# Patient Record
Sex: FEMALE | Race: WHITE | NOT HISPANIC OR LATINO | URBAN - METROPOLITAN AREA
[De-identification: names, ages, dates, MRNs, and addresses within clinical notes are randomized per-mention and may not be internally consistent; named-entity substitution may affect disease eponyms.]

---

## 2018-06-19 ENCOUNTER — OUTPATIENT (OUTPATIENT)
Dept: OUTPATIENT SERVICES | Facility: HOSPITAL | Age: 83
LOS: 1 days | Discharge: HOME | End: 2018-06-19

## 2018-06-19 DIAGNOSIS — E55.9 VITAMIN D DEFICIENCY, UNSPECIFIED: ICD-10-CM

## 2018-06-19 DIAGNOSIS — E78.4 OTHER HYPERLIPIDEMIA: ICD-10-CM

## 2018-06-19 DIAGNOSIS — I10 ESSENTIAL (PRIMARY) HYPERTENSION: ICD-10-CM

## 2018-08-25 ENCOUNTER — INPATIENT (INPATIENT)
Facility: HOSPITAL | Age: 83
LOS: 3 days | Discharge: HOME HEALTH | End: 2018-08-29
Attending: INTERNAL MEDICINE | Admitting: INTERNAL MEDICINE
Payer: MEDICARE

## 2018-08-25 VITALS
OXYGEN SATURATION: 99 % | TEMPERATURE: 97 F | DIASTOLIC BLOOD PRESSURE: 81 MMHG | SYSTOLIC BLOOD PRESSURE: 177 MMHG | HEART RATE: 74 BPM | RESPIRATION RATE: 18 BRPM

## 2018-08-25 LAB
ALBUMIN SERPL ELPH-MCNC: 4.2 G/DL — SIGNIFICANT CHANGE UP (ref 3.5–5.2)
ALP SERPL-CCNC: 65 U/L — SIGNIFICANT CHANGE UP (ref 30–115)
ALT FLD-CCNC: 8 U/L — SIGNIFICANT CHANGE UP (ref 0–41)
ANION GAP SERPL CALC-SCNC: 12 MMOL/L — SIGNIFICANT CHANGE UP (ref 7–14)
APPEARANCE UR: CLEAR — SIGNIFICANT CHANGE UP
AST SERPL-CCNC: 20 U/L — SIGNIFICANT CHANGE UP (ref 0–41)
BASE EXCESS BLDV CALC-SCNC: 8.5 MMOL/L — HIGH (ref -2–2)
BASOPHILS # BLD AUTO: 0.01 K/UL — SIGNIFICANT CHANGE UP (ref 0–0.2)
BASOPHILS NFR BLD AUTO: 0.1 % — SIGNIFICANT CHANGE UP (ref 0–1)
BILIRUB DIRECT SERPL-MCNC: <0.2 MG/DL — SIGNIFICANT CHANGE UP (ref 0–0.2)
BILIRUB INDIRECT FLD-MCNC: >0.4 MG/DL — SIGNIFICANT CHANGE UP (ref 0.2–1.2)
BILIRUB SERPL-MCNC: 0.6 MG/DL — SIGNIFICANT CHANGE UP (ref 0.2–1.2)
BILIRUB UR-MCNC: NEGATIVE — SIGNIFICANT CHANGE UP
BLD GP AB SCN SERPL QL: SIGNIFICANT CHANGE UP
BUN SERPL-MCNC: 23 MG/DL — HIGH (ref 10–20)
CA-I SERPL-SCNC: 1.27 MMOL/L — SIGNIFICANT CHANGE UP (ref 1.12–1.3)
CALCIUM SERPL-MCNC: 10.1 MG/DL — SIGNIFICANT CHANGE UP (ref 8.5–10.1)
CHLORIDE SERPL-SCNC: 94 MMOL/L — LOW (ref 98–110)
CO2 SERPL-SCNC: 30 MMOL/L — SIGNIFICANT CHANGE UP (ref 17–32)
COLOR SPEC: YELLOW — SIGNIFICANT CHANGE UP
CREAT SERPL-MCNC: 1 MG/DL — SIGNIFICANT CHANGE UP (ref 0.7–1.5)
DIFF PNL FLD: NEGATIVE — SIGNIFICANT CHANGE UP
DIGOXIN SERPL-MCNC: <0.3 NG/ML — LOW (ref 0.8–2)
EOSINOPHIL # BLD AUTO: 0.01 K/UL — SIGNIFICANT CHANGE UP (ref 0–0.7)
EOSINOPHIL NFR BLD AUTO: 0.1 % — SIGNIFICANT CHANGE UP (ref 0–8)
GAS PNL BLDV: 139 MMOL/L — SIGNIFICANT CHANGE UP (ref 136–145)
GAS PNL BLDV: SIGNIFICANT CHANGE UP
GLUCOSE SERPL-MCNC: 113 MG/DL — HIGH (ref 70–99)
GLUCOSE UR QL: NEGATIVE MG/DL — SIGNIFICANT CHANGE UP
HCO3 BLDV-SCNC: 35 MMOL/L — HIGH (ref 22–29)
HCT VFR BLD CALC: 36.8 % — LOW (ref 37–47)
HCT VFR BLDA CALC: 34.8 % — SIGNIFICANT CHANGE UP (ref 34–44)
HGB BLD CALC-MCNC: 11.4 G/DL — LOW (ref 14–18)
HGB BLD-MCNC: 12.3 G/DL — SIGNIFICANT CHANGE UP (ref 12–16)
IMM GRANULOCYTES NFR BLD AUTO: 0.4 % — HIGH (ref 0.1–0.3)
KETONES UR-MCNC: NEGATIVE — SIGNIFICANT CHANGE UP
LACTATE BLDV-MCNC: 1.6 MMOL/L — SIGNIFICANT CHANGE UP (ref 0.5–1.6)
LEUKOCYTE ESTERASE UR-ACNC: NEGATIVE — SIGNIFICANT CHANGE UP
LYMPHOCYTES # BLD AUTO: 1.27 K/UL — SIGNIFICANT CHANGE UP (ref 1.2–3.4)
LYMPHOCYTES # BLD AUTO: 17.9 % — LOW (ref 20.5–51.1)
MCHC RBC-ENTMCNC: 29.8 PG — SIGNIFICANT CHANGE UP (ref 27–31)
MCHC RBC-ENTMCNC: 33.4 G/DL — SIGNIFICANT CHANGE UP (ref 32–37)
MCV RBC AUTO: 89.1 FL — SIGNIFICANT CHANGE UP (ref 81–99)
MONOCYTES # BLD AUTO: 0.49 K/UL — SIGNIFICANT CHANGE UP (ref 0.1–0.6)
MONOCYTES NFR BLD AUTO: 6.9 % — SIGNIFICANT CHANGE UP (ref 1.7–9.3)
NEUTROPHILS # BLD AUTO: 5.28 K/UL — SIGNIFICANT CHANGE UP (ref 1.4–6.5)
NEUTROPHILS NFR BLD AUTO: 74.6 % — SIGNIFICANT CHANGE UP (ref 42.2–75.2)
NITRITE UR-MCNC: NEGATIVE — SIGNIFICANT CHANGE UP
NRBC # BLD: 0 /100 WBCS — SIGNIFICANT CHANGE UP (ref 0–0)
NT-PROBNP SERPL-SCNC: 845 PG/ML — HIGH (ref 0–300)
PCO2 BLDV: 60 MMHG — HIGH (ref 41–51)
PH BLDV: 7.38 — SIGNIFICANT CHANGE UP (ref 7.26–7.43)
PH UR: 7 — SIGNIFICANT CHANGE UP (ref 5–8)
PLATELET # BLD AUTO: 210 K/UL — SIGNIFICANT CHANGE UP (ref 130–400)
PO2 BLDV: 19 MMHG — LOW (ref 20–40)
POTASSIUM BLDV-SCNC: 4 MMOL/L — SIGNIFICANT CHANGE UP (ref 3.3–5.6)
POTASSIUM SERPL-MCNC: 4 MMOL/L — SIGNIFICANT CHANGE UP (ref 3.5–5)
POTASSIUM SERPL-SCNC: 4 MMOL/L — SIGNIFICANT CHANGE UP (ref 3.5–5)
PROT SERPL-MCNC: 7.5 G/DL — SIGNIFICANT CHANGE UP (ref 6–8)
PROT UR-MCNC: NEGATIVE MG/DL — SIGNIFICANT CHANGE UP
RBC # BLD: 4.13 M/UL — LOW (ref 4.2–5.4)
RBC # FLD: 13.4 % — SIGNIFICANT CHANGE UP (ref 11.5–14.5)
SAO2 % BLDV: 24 % — SIGNIFICANT CHANGE UP
SODIUM SERPL-SCNC: 136 MMOL/L — SIGNIFICANT CHANGE UP (ref 135–146)
SP GR SPEC: 1.01 — SIGNIFICANT CHANGE UP (ref 1.01–1.03)
TYPE + AB SCN PNL BLD: SIGNIFICANT CHANGE UP
UROBILINOGEN FLD QL: 0.2 MG/DL — SIGNIFICANT CHANGE UP (ref 0.2–0.2)
WBC # BLD: 7.09 K/UL — SIGNIFICANT CHANGE UP (ref 4.8–10.8)
WBC # FLD AUTO: 7.09 K/UL — SIGNIFICANT CHANGE UP (ref 4.8–10.8)

## 2018-08-25 RX ORDER — IOHEXOL 300 MG/ML
30 INJECTION, SOLUTION INTRAVENOUS ONCE
Qty: 0 | Refills: 0 | Status: COMPLETED | OUTPATIENT
Start: 2018-08-25 | End: 2018-08-25

## 2018-08-25 RX ORDER — SODIUM CHLORIDE 9 MG/ML
3 INJECTION INTRAMUSCULAR; INTRAVENOUS; SUBCUTANEOUS ONCE
Qty: 0 | Refills: 0 | Status: COMPLETED | OUTPATIENT
Start: 2018-08-25 | End: 2018-08-25

## 2018-08-25 RX ORDER — SODIUM CHLORIDE 9 MG/ML
500 INJECTION, SOLUTION INTRAVENOUS ONCE
Qty: 0 | Refills: 0 | Status: COMPLETED | OUTPATIENT
Start: 2018-08-25 | End: 2018-08-25

## 2018-08-25 RX ADMIN — SODIUM CHLORIDE 500 MILLILITER(S): 9 INJECTION, SOLUTION INTRAVENOUS at 23:29

## 2018-08-25 RX ADMIN — SODIUM CHLORIDE 3 MILLILITER(S): 9 INJECTION INTRAMUSCULAR; INTRAVENOUS; SUBCUTANEOUS at 19:43

## 2018-08-25 RX ADMIN — IOHEXOL 30 MILLILITER(S): 300 INJECTION, SOLUTION INTRAVENOUS at 19:48

## 2018-08-25 NOTE — ED PROVIDER NOTE - PROGRESS NOTE DETAILS
pt resting comfortable in nad, pending urine and imaging, labs reviewed lactate 1.6, will continue to monitor and reassess.

## 2018-08-25 NOTE — ED ADULT NURSE NOTE - CAS EDN DISCHARGE ASSESSMENT
Patient baseline mental status Awake/Patient baseline mental status/Alert and oriented to person, place and time

## 2018-08-25 NOTE — ED ADULT NURSE NOTE - OBJECTIVE STATEMENT
pt is a 89 yo male co generalized abd pain and diarrhea starting 2 days ago. diarrea noted to be dark greenish color watery. denies n/d/diz/ha. abd soft nontender, +bowel sounds. clear lungs.

## 2018-08-25 NOTE — ED PROVIDER NOTE - ATTENDING CONTRIBUTION TO CARE
I personally evaluated the patient. I reviewed the Resident’s or Physician Assistant’s note (as assigned above), and agree with the findings and plan except as documented in my note.  A 89 y/o f w/ pmhx of htn, dld, dm, anemia reports was on iron, oa, chf on digoxin, afib on xarelto and Cardizem, colon surgery where pt reports she had a colostomy that was revered (cannot specify why) presents fo diarrhea, diffuse, dark in color, and watery since yesterday, worsened today associated generalized abd pain. not on any abx, no one with similar symptoms. reports symptoms started adfter she possibly had a salad yesterday. No fever, chills, n/v, cp, sob, pleuritic cp, palpitations, diaphoresis, cough, ha/lh/dizziness, numbness/tingling, neck pain/ stiffness, urinary symptoms, trauma, weakness, edema, calf pain/swelling/erythema, sick contacts, recent travel or rash.  On Exam: Vital Signs: I have reviewed the initial vital signs. Constitutional: WDWN in nad. Sitting on stretcher speaking full sentences. Integumentary: No rash. ENT: Dry MM NECK: Supple, non-tender, no meningeal signs. Cardiovascular: RRR, radial pulses 2/4 b/l. No JVD. Respiratory: BS present b/l, ctabl, no wheezing or crackles, no accessory muscle use, no stridor. Gastrointestinal: BS present throughout all 4 quadrants, soft, nd, generalized tenderness to palpation, no rebound tenderness or guarding, no cvat. Musculoskeletal: FROM, 2+ pitting edema, no calf pain/swelling/erythema. Neurologic: AAOx3, motor 5/5 and sensation intact throughout upper and lowee ext, CN II-XII intact, No facial droop or slurring of speech. No focal deficits. I personally evaluated the patient. I reviewed the Resident’s or Physician Assistant’s note (as assigned above), and agree with the findings and plan except as documented in my note.  A 89 y/o f w/ pmhx of htn, dld, dm, anemia reports was on iron, oa, chf on digoxin, afib on xarelto and Cardizem, colon surgery where pt reports she had a colostomy that was revered (cannot specify why) presents fo diarrhea, diffuse, dark in color, and watery since yesterday, worsened today associated generalized abd pain. not on any abx, no one with similar symptoms. reports symptoms started adfter she possibly had a salad yesterday. No fever, chills, n/v, cp, sob, pleuritic cp, palpitations, diaphoresis, cough, ha/lh/dizziness, numbness/tingling, neck pain/ stiffness, urinary symptoms, trauma, weakness, edema, calf pain/swelling/erythema, sick contacts, recent travel or rash.  On Exam: Vital Signs: I have reviewed the initial vital signs. Constitutional: WDWN in nad. Sitting on stretcher speaking full sentences. Integumentary: No rash. ENT: Dry MM NECK: Supple, non-tender, no meningeal signs. Cardiovascular: RRR, radial pulses 2/4 b/l. No JVD. Respiratory: BS present b/l, ctabl, no wheezing or crackles, no accessory muscle use, no stridor. Gastrointestinal: BS present throughout all 4 quadrants, soft, nd, generalized tenderness to palpation, no rebound tenderness or guarding, no cvat. rectal as done by P.A. : brown stool no melena p brbpr. Musculoskeletal: FROM, 2+ pitting edema, no calf pain/swelling/erythema. Neurologic: AAOx3, motor 5/5 and sensation intact throughout upper and lowee ext, CN II-XII intact, No facial droop or slurring of speech. No focal deficits.

## 2018-08-25 NOTE — ED PROVIDER NOTE - PHYSICAL EXAMINATION
Constitutional: Well developed, well nourished. NAD  Head: Normocephalic, atraumatic.  Eyes: PERRL, EOMI.  ENT: No nasal discharge. Mucous membranes dry.  Neck: Supple. Painless ROM.  Cardiovascular: Regular rate and rhythm.   Pulmonary: Copd chest; decreased breath sounds bilat; no focal consolidation  Abdominal: Soft. mild tenderness diffuse lower abdomen; no rebound, guarding or flank pain  Extremities. Pelvis stable; + 3 bilat pedal edema; no calf tenderness  Skin: No rashes, cyanosis.  Neuro: AAOx3. No focal neurological deficits.  Psych: Normal mood. Normal affect.

## 2018-08-25 NOTE — ED PROVIDER NOTE - NS ED ROS FT
Constitutional: No fever, chills.  Eyes: No visual changes.  ENT: No hearing changes.  Neck: No neck pain or stiffness.  Cardiovascular: No chest pain, palpitations, edema.  Pulmonary: No SOB, cough. No hemoptysis.  Abdominal:  see hpi  : No dysuria, frequency.  Neuro: No headache, syncope, dizziness.  MS: No back pain. No calf pain/swelling.  Psych: No suicidal ideations.

## 2018-08-25 NOTE — ED ADULT NURSE NOTE - NSIMPLEMENTINTERV_GEN_ALL_ED
Implemented All Fall with Harm Risk Interventions:  [place] to call system. Call bell, personal items and telephone within reach. Instruct patient to call for assistance. Room bathroom lighting operational. Non-slip footwear when patient is off stretcher. Physically safe environment: no spills, clutter or unnecessary equipment. Stretcher in lowest position, wheels locked, appropriate side rails in place. Provide visual cue, wrist band, yellow gown, etc. Monitor gait and stability. Monitor for mental status changes and reorient to person, place, and time. Review medications for side effects contributing to fall risk. Reinforce activity limits and safety measures with patient and family. Provide visual clues: red socks.

## 2018-08-25 NOTE — ED PROVIDER NOTE - OBJECTIVE STATEMENT
89 yold female to Ed Pmhx Htn, Hld, Dm, OA, CHF, afib(currently on xeralto, dig, cardizem), hx of colon sx(colostomy reversed) c/o diffuse lower abdominal pain, profuse dark watery diarrhea started last night and got worse tonight; pt [place] denies n/v, sob, fever, chills back pain; believes possibly related to ?salad; pt with hx of anemia - has had ? transfuions in past;

## 2018-08-25 NOTE — ED PROVIDER NOTE - CARE PLAN
Assessment and plan of treatment:	Plan: EKG, CXR, labs, gentle fluid hydration as pt with history of chf, urine, imaging, reassess. Principal Discharge DX:	Intractable diarrhea  Assessment and plan of treatment:	Plan: EKG, CXR, labs, gentle fluid hydration as pt with history of chf, urine, imaging, reassess.

## 2018-08-26 DIAGNOSIS — R09.89 OTHER SPECIFIED SYMPTOMS AND SIGNS INVOLVING THE CIRCULATORY AND RESPIRATORY SYSTEMS: ICD-10-CM

## 2018-08-26 DIAGNOSIS — Z90.710 ACQUIRED ABSENCE OF BOTH CERVIX AND UTERUS: Chronic | ICD-10-CM

## 2018-08-26 DIAGNOSIS — Z90.49 ACQUIRED ABSENCE OF OTHER SPECIFIED PARTS OF DIGESTIVE TRACT: Chronic | ICD-10-CM

## 2018-08-26 LAB
ANION GAP SERPL CALC-SCNC: 10 MMOL/L — SIGNIFICANT CHANGE UP (ref 7–14)
BASOPHILS # BLD AUTO: 0.01 K/UL — SIGNIFICANT CHANGE UP (ref 0–0.2)
BASOPHILS NFR BLD AUTO: 0.2 % — SIGNIFICANT CHANGE UP (ref 0–1)
BUN SERPL-MCNC: 16 MG/DL — SIGNIFICANT CHANGE UP (ref 10–20)
CALCIUM SERPL-MCNC: 8.8 MG/DL — SIGNIFICANT CHANGE UP (ref 8.5–10.1)
CHLORIDE SERPL-SCNC: 100 MMOL/L — SIGNIFICANT CHANGE UP (ref 98–110)
CO2 SERPL-SCNC: 28 MMOL/L — SIGNIFICANT CHANGE UP (ref 17–32)
CREAT SERPL-MCNC: 0.8 MG/DL — SIGNIFICANT CHANGE UP (ref 0.7–1.5)
EOSINOPHIL # BLD AUTO: 0.07 K/UL — SIGNIFICANT CHANGE UP (ref 0–0.7)
EOSINOPHIL NFR BLD AUTO: 1.3 % — SIGNIFICANT CHANGE UP (ref 0–8)
ERYTHROCYTE [SEDIMENTATION RATE] IN BLOOD: 31 MM/HR — HIGH (ref 0–20)
GLUCOSE SERPL-MCNC: 100 MG/DL — HIGH (ref 70–99)
HCT VFR BLD CALC: 31.6 % — LOW (ref 37–47)
HGB BLD-MCNC: 10.9 G/DL — LOW (ref 12–16)
IMM GRANULOCYTES NFR BLD AUTO: 0.4 % — HIGH (ref 0.1–0.3)
LYMPHOCYTES # BLD AUTO: 1.32 K/UL — SIGNIFICANT CHANGE UP (ref 1.2–3.4)
LYMPHOCYTES # BLD AUTO: 23.7 % — SIGNIFICANT CHANGE UP (ref 20.5–51.1)
MAGNESIUM SERPL-MCNC: 1.7 MG/DL — LOW (ref 1.8–2.4)
MCHC RBC-ENTMCNC: 30.1 PG — SIGNIFICANT CHANGE UP (ref 27–31)
MCHC RBC-ENTMCNC: 34.5 G/DL — SIGNIFICANT CHANGE UP (ref 32–37)
MCV RBC AUTO: 87.3 FL — SIGNIFICANT CHANGE UP (ref 81–99)
MONOCYTES # BLD AUTO: 0.42 K/UL — SIGNIFICANT CHANGE UP (ref 0.1–0.6)
MONOCYTES NFR BLD AUTO: 7.5 % — SIGNIFICANT CHANGE UP (ref 1.7–9.3)
NEUTROPHILS # BLD AUTO: 3.73 K/UL — SIGNIFICANT CHANGE UP (ref 1.4–6.5)
NEUTROPHILS NFR BLD AUTO: 66.9 % — SIGNIFICANT CHANGE UP (ref 42.2–75.2)
NRBC # BLD: 0 /100 WBCS — SIGNIFICANT CHANGE UP (ref 0–0)
PLATELET # BLD AUTO: 177 K/UL — SIGNIFICANT CHANGE UP (ref 130–400)
POTASSIUM SERPL-MCNC: 3.6 MMOL/L — SIGNIFICANT CHANGE UP (ref 3.5–5)
POTASSIUM SERPL-SCNC: 3.6 MMOL/L — SIGNIFICANT CHANGE UP (ref 3.5–5)
RBC # BLD: 3.62 M/UL — LOW (ref 4.2–5.4)
RBC # FLD: 13.2 % — SIGNIFICANT CHANGE UP (ref 11.5–14.5)
SODIUM SERPL-SCNC: 138 MMOL/L — SIGNIFICANT CHANGE UP (ref 135–146)
WBC # BLD: 5.57 K/UL — SIGNIFICANT CHANGE UP (ref 4.8–10.8)
WBC # FLD AUTO: 5.57 K/UL — SIGNIFICANT CHANGE UP (ref 4.8–10.8)

## 2018-08-26 PROCEDURE — 93970 EXTREMITY STUDY: CPT | Mod: 26

## 2018-08-26 RX ORDER — LISINOPRIL 2.5 MG/1
40 TABLET ORAL DAILY
Qty: 0 | Refills: 0 | Status: DISCONTINUED | OUTPATIENT
Start: 2018-08-26 | End: 2018-08-29

## 2018-08-26 RX ORDER — HYDROCHLOROTHIAZIDE 25 MG
12.5 TABLET ORAL DAILY
Qty: 0 | Refills: 0 | Status: DISCONTINUED | OUTPATIENT
Start: 2018-08-26 | End: 2018-08-29

## 2018-08-26 RX ORDER — METOPROLOL TARTRATE 50 MG
25 TABLET ORAL
Qty: 0 | Refills: 0 | Status: DISCONTINUED | OUTPATIENT
Start: 2018-08-26 | End: 2018-08-29

## 2018-08-26 RX ORDER — FERROUS SULFATE 325(65) MG
1 TABLET ORAL
Qty: 0 | Refills: 0 | COMMUNITY

## 2018-08-26 RX ORDER — PANTOPRAZOLE SODIUM 20 MG/1
40 TABLET, DELAYED RELEASE ORAL
Qty: 0 | Refills: 0 | Status: DISCONTINUED | OUTPATIENT
Start: 2018-08-26 | End: 2018-08-29

## 2018-08-26 RX ORDER — FERROUS SULFATE 325(65) MG
325 TABLET ORAL DAILY
Qty: 0 | Refills: 0 | Status: DISCONTINUED | OUTPATIENT
Start: 2018-08-26 | End: 2018-08-29

## 2018-08-26 RX ORDER — RIVAROXABAN 15 MG-20MG
10 KIT ORAL EVERY 24 HOURS
Qty: 0 | Refills: 0 | Status: DISCONTINUED | OUTPATIENT
Start: 2018-08-26 | End: 2018-08-29

## 2018-08-26 RX ADMIN — Medication 25 MILLIGRAM(S): at 18:00

## 2018-08-26 RX ADMIN — PANTOPRAZOLE SODIUM 40 MILLIGRAM(S): 20 TABLET, DELAYED RELEASE ORAL at 06:30

## 2018-08-26 RX ADMIN — RIVAROXABAN 10 MILLIGRAM(S): KIT at 18:00

## 2018-08-26 RX ADMIN — Medication 12.5 MILLIGRAM(S): at 06:30

## 2018-08-26 RX ADMIN — Medication 1 TABLET(S): at 11:56

## 2018-08-26 RX ADMIN — Medication 25 MILLIGRAM(S): at 06:30

## 2018-08-26 RX ADMIN — LISINOPRIL 40 MILLIGRAM(S): 2.5 TABLET ORAL at 06:30

## 2018-08-26 RX ADMIN — Medication 325 MILLIGRAM(S): at 11:56

## 2018-08-26 NOTE — H&P ADULT - NSHPSOCIALHISTORY_GEN_ALL_CORE
Social History:    Lives with: ( X ) alone  (  ) children   (  ) spouse   (  ) parents  (  ) other    Substance Use (street drugs): ( X ) never used  (  ) other:  Tobacco Usage:  ( X ) never smoked   (   ) former smoker   (   ) current smoker  (     ) pack year  (        ) last cigarette date  Alcohol Usage: Negative

## 2018-08-26 NOTE — H&P ADULT - ATTENDING COMMENTS
89 yo female patient with PMHx of paroxysmal AF on Xarelto, HTN, DL, presented from home because of acute onset diarrhea of 1 day duration.  Patient says she started experiencing watery non bloody diarrhea since the morning, multiple episodes (around 4 episodes), associated with abdominal cramping. No nausea or vomiting. Yesterday she ate home made salad with lemon oil dressing, no lebron. Patient denies chills or fevers, but admits having lower extremities swelling for few days now.  Currently, patient is lying comfortably in bed, no other complaints.  Patient lives at home by herself. Her daughter visits her frequently. She walks with a walker.    REVIEW OF SYSTEMS: see cc/HPI  CONSTITUTIONAL: No weakness, fevers or chills  EYES/ENT: No visual changes;  No vertigo or throat pain   NECK: No pain or stiffness  RESPIRATORY: No cough, wheezing, hemoptysis; No shortness of breath  CARDIOVASCULAR: No chest pain or palpitations  GASTROINTESTINAL: No abdominal or epigastric pain. No nausea, vomiting, or hematemesis; (+) diarrhea NO constipation. No melena or hematochezia.  GENITOURINARY: No dysuria, frequency or hematuria  NEUROLOGICAL: No numbness or weakness  SKIN: No itching, rashes    Physical Exam:    General: WN/WD NAD  Neurology: A&Ox3, nonfocal, follows commands  Eyes: PERRLA/ EOMI  ENT/Neck: Neck supple, trachea midline, No JVD  Respiratory: CTA B/L, No wheezing, rales, rhonchi  CV: Normal rate regular rhythm, S1S2, no murmurs, rubs or gallops  Abdominal: Soft, NT, ND +BS-hyperactive   Extremities: No edema, + peripheral pulses  Skin: No Rashes, Hematoma, Ecchymosis  Incisions: n/a  Tubes: n/a    A/P  Gastroenteritis - acute r/o C diff  -IV fluid x 12-24  -hold Mg Ox supplement for now   -send stool for eval    Pedal edema - not present on PE and no evidence of HF  - check 2D echo and TSH     P. A. Fib / HTN   -hold diuretic for now   -c/w Metoprolol, Lisinopril and Xarelto

## 2018-08-26 NOTE — H&P ADULT - NSHPPHYSICALEXAM_GEN_ALL_CORE
PHYSICAL EXAM:    T(F): 97.1, Max: 97.1 (08-25-18 @ 23:18)  HR: 76  BP: 153/80  RR: 18  SpO2: 98%    GENERAL: NAD, well-developed  HEAD:  Atraumatic, Normocephalic  EYES: EOMI, PERRLA, conjunctiva and sclera clear  NECK: Supple, No JVD  CHEST/LUNG: Clear to auscultation bilaterally; No wheeze  HEART: Regular rate and rhythm; No murmurs, rubs, or gallops  ABDOMEN: Soft, Nontender, Nondistended; Bowel sounds present  EXTREMITIES:  1+ Peripheral Pulses, No clubbing or cyanosis, bilateral lower extremities pitting edema +2  PSYCH: AAOx3  NEUROLOGY: non-focal

## 2018-08-26 NOTE — ED ADULT NURSE REASSESSMENT NOTE - NS ED NURSE REASSESS COMMENT FT1
Pt soiled self in bed attempted to put self on bedpan, perineal care provided x2 people. Pt resting comfortably at this time. Unable to collect stool sample at this time.

## 2018-08-26 NOTE — H&P ADULT - NSHPLABSRESULTS_GEN_ALL_CORE
12.3   7.09  )-----------( 210      ( 25 Aug 2018 20:00 )             36.8     136  |  94<L>  |  23<H>  ----------------------------<  113<H>  4.0   |  30  |  1.0    Ca    10.1      25 Aug 2018 20:00    TPro  7.5  /  Alb  4.2  /  TBili  0.6  /  DBili  <0.2  /  AST  20  /  ALT  8   /  AlkPhos  65  08-        Urinalysis Basic - ( 25 Aug 2018 22:02 )    Color: Yellow / Appearance: Clear / S.010 / pH: x  Gluc: x / Ketone: Negative  / Bili: Negative / Urobili: 0.2 mg/dL   Blood: x / Protein: Negative mg/dL / Nitrite: Negative   Leuk Esterase: Negative / RBC: x / WBC x   Sq Epi: x / Non Sq Epi: x / Bacteria: x    CXR: normal    ECG: sinus rhythm at 85bpm, no ischemic changes    < from: CT Abdomen and Pelvis w/ Oral Cont and w/ IV Cont (18 @ 22:58) >    IMPRESSION:    No CT evidence of acute intra-abdominal pathology.    Increasing CBD dilatation. If clinically warranted, MRCP may be of   benefit.    No evidence of small bowel obstruction    < end of copied text >

## 2018-08-26 NOTE — H&P ADULT - HISTORY OF PRESENT ILLNESS
89 yo female patient with PMHx of paroxysmal AF on Xarelto, HTN, DL, presented from home because of acute onset diarrhea of 1 day duration.  Patient says she started experiencing watery non bloody diarrhea since the morning, multiple episodes (around 4 episodes), associated with abdominal cramping. No nausea or vomiting. Yesterday she ate home made salad with lemon oil dressing, no lebron. Patient denies chills or fevers, but admits having lower extremities swelling for few days now.  Currently, patient is lying comfortably in bed, no other complaints.  Patient lives at home by herself. Her daughter visits her frequently. She walks with a walker.

## 2018-08-26 NOTE — H&P ADULT - ASSESSMENT
89 yo female patient with PMHx of paroxysmal AF on Xarelto, HTN, DL, presented from home because of acute onset diarrhea of 1 day duration.    #) Acute gastroenteritis  Watery diarrhea more than 5 episodes in 24h, non bloody; no fever no chills no WBC  R/O food poisoning, R/O viral gastroenteritis, R/O non-infectious causes of diarrhea (magnesium oxide induced...)  Symptomatic treatment for now, no antibiotic therapy indicated  Analgesics as needed, anti-emetics as needed    #) Bilateral lower extremities edema +2  check TSH, check 2d echo R/O CHF  Won't give lasix for now, patient is having diarrhea, especially that patient has no SOB or pulmonary edema (no urgency in giving diuretics)  Check venous duplex lower extremities    #) Paroxysmal AF  continue with metoprolol and xarelto    #) HTN  continue with lisinopril, hctz    #) Miscellaneous  DVT and GI ppx  DASH diet, anti diarrheal  Ambulate with walker, PT eval  Full code  dispo home vs SNF

## 2018-08-27 LAB
ANION GAP SERPL CALC-SCNC: 18 MMOL/L — HIGH (ref 7–14)
BASOPHILS # BLD AUTO: 0.02 K/UL — SIGNIFICANT CHANGE UP (ref 0–0.2)
BASOPHILS NFR BLD AUTO: 0.3 % — SIGNIFICANT CHANGE UP (ref 0–1)
BUN SERPL-MCNC: 16 MG/DL — SIGNIFICANT CHANGE UP (ref 10–20)
CALCIUM SERPL-MCNC: 8.8 MG/DL — SIGNIFICANT CHANGE UP (ref 8.5–10.1)
CHLORIDE SERPL-SCNC: 97 MMOL/L — LOW (ref 98–110)
CO2 SERPL-SCNC: 23 MMOL/L — SIGNIFICANT CHANGE UP (ref 17–32)
CREAT SERPL-MCNC: 0.9 MG/DL — SIGNIFICANT CHANGE UP (ref 0.7–1.5)
CRP SERPL-MCNC: 0.56 MG/DL — HIGH (ref 0–0.4)
EOSINOPHIL # BLD AUTO: 0.07 K/UL — SIGNIFICANT CHANGE UP (ref 0–0.7)
EOSINOPHIL NFR BLD AUTO: 1.2 % — SIGNIFICANT CHANGE UP (ref 0–8)
GLUCOSE SERPL-MCNC: 86 MG/DL — SIGNIFICANT CHANGE UP (ref 70–99)
HCT VFR BLD CALC: 33.1 % — LOW (ref 37–47)
HGB BLD-MCNC: 11.3 G/DL — LOW (ref 12–16)
IMM GRANULOCYTES NFR BLD AUTO: 0.3 % — SIGNIFICANT CHANGE UP (ref 0.1–0.3)
LYMPHOCYTES # BLD AUTO: 1.26 K/UL — SIGNIFICANT CHANGE UP (ref 1.2–3.4)
LYMPHOCYTES # BLD AUTO: 21 % — SIGNIFICANT CHANGE UP (ref 20.5–51.1)
MAGNESIUM SERPL-MCNC: 1.7 MG/DL — LOW (ref 1.8–2.4)
MCHC RBC-ENTMCNC: 30.1 PG — SIGNIFICANT CHANGE UP (ref 27–31)
MCHC RBC-ENTMCNC: 34.1 G/DL — SIGNIFICANT CHANGE UP (ref 32–37)
MCV RBC AUTO: 88.3 FL — SIGNIFICANT CHANGE UP (ref 81–99)
MONOCYTES # BLD AUTO: 0.45 K/UL — SIGNIFICANT CHANGE UP (ref 0.1–0.6)
MONOCYTES NFR BLD AUTO: 7.5 % — SIGNIFICANT CHANGE UP (ref 1.7–9.3)
NEUTROPHILS # BLD AUTO: 4.18 K/UL — SIGNIFICANT CHANGE UP (ref 1.4–6.5)
NEUTROPHILS NFR BLD AUTO: 69.7 % — SIGNIFICANT CHANGE UP (ref 42.2–75.2)
NRBC # BLD: 0 /100 WBCS — SIGNIFICANT CHANGE UP (ref 0–0)
PLATELET # BLD AUTO: 119 K/UL — LOW (ref 130–400)
POTASSIUM SERPL-MCNC: 4 MMOL/L — SIGNIFICANT CHANGE UP (ref 3.5–5)
POTASSIUM SERPL-SCNC: 4 MMOL/L — SIGNIFICANT CHANGE UP (ref 3.5–5)
RBC # BLD: 3.75 M/UL — LOW (ref 4.2–5.4)
RBC # FLD: 13.2 % — SIGNIFICANT CHANGE UP (ref 11.5–14.5)
SODIUM SERPL-SCNC: 138 MMOL/L — SIGNIFICANT CHANGE UP (ref 135–146)
TSH SERPL-MCNC: 3.62 UIU/ML — SIGNIFICANT CHANGE UP (ref 0.27–4.2)
WBC # BLD: 6 K/UL — SIGNIFICANT CHANGE UP (ref 4.8–10.8)
WBC # FLD AUTO: 6 K/UL — SIGNIFICANT CHANGE UP (ref 4.8–10.8)

## 2018-08-27 RX ORDER — DOCUSATE SODIUM 100 MG
100 CAPSULE ORAL
Qty: 0 | Refills: 0 | Status: DISCONTINUED | OUTPATIENT
Start: 2018-08-27 | End: 2018-08-29

## 2018-08-27 RX ORDER — MAGNESIUM SULFATE 500 MG/ML
1 VIAL (ML) INJECTION ONCE
Qty: 0 | Refills: 0 | Status: COMPLETED | OUTPATIENT
Start: 2018-08-27 | End: 2018-08-27

## 2018-08-27 RX ADMIN — Medication 325 MILLIGRAM(S): at 11:50

## 2018-08-27 RX ADMIN — Medication 1 TABLET(S): at 11:51

## 2018-08-27 RX ADMIN — Medication 100 GRAM(S): at 13:20

## 2018-08-27 RX ADMIN — Medication 100 MILLIGRAM(S): at 21:24

## 2018-08-27 RX ADMIN — PANTOPRAZOLE SODIUM 40 MILLIGRAM(S): 20 TABLET, DELAYED RELEASE ORAL at 06:07

## 2018-08-27 RX ADMIN — Medication 25 MILLIGRAM(S): at 06:07

## 2018-08-27 RX ADMIN — Medication 25 MILLIGRAM(S): at 17:39

## 2018-08-27 RX ADMIN — Medication 12.5 MILLIGRAM(S): at 06:07

## 2018-08-27 RX ADMIN — RIVAROXABAN 10 MILLIGRAM(S): KIT at 17:39

## 2018-08-27 RX ADMIN — LISINOPRIL 40 MILLIGRAM(S): 2.5 TABLET ORAL at 06:07

## 2018-08-27 NOTE — PROGRESS NOTE ADULT - ASSESSMENT
89 yo female patient with PMHx of paroxysmal AF on Xarelto, HTN, DL, presented from home because of acute onset diarrhea of 1 day duration.  Patient says she started experiencing watery non bloody diarrhea since the morning, multiple episodes (around 4 episodes), associated with abdominal cramping. No nausea or vomiting. Yesterday she ate home made salad with lemon oil dressing, no lebron. Patient denies chills or fevers, but admits having lower extremities swelling for few days now.  Currently, patient is lying comfortably in bed, no other complaints.  Patient lives at home by herself and has HHA       Gastroenteritis - resolved   -IV fluid x 12-24  -hold Mg Ox supplement for now   -no need to check for cdiff - no recent hospitalization or antibiotic use     Pedal edema - not present on PE and no evidence of HF  - check 2D echo and TSH   -chronic interstitial infiltrates on cxr - reviewed myself - no sob or pain    P. A. Fib / HTN   -hold diuretic for now   -c/w Metoprolol, Lisinopril and Xarelto .  . Her daughter visits her frequently. She walks with a walker. and has HHA    ambulate pt - if stable can dc home with HHA

## 2018-08-27 NOTE — PROGRESS NOTE ADULT - ASSESSMENT
89 yo female patient with PMHx of paroxysmal AF on Xarelto, HTN, DL, presented from home because of acute onset diarrhea of 1 day duration.  Patient says she started experiencing watery non bloody diarrhea since the morning, multiple episodes (around 4 episodes), associated with abdominal cramping. No nausea or vomiting. Yesterday she ate home made salad with lemon oil dressing, no lebron. Patient denies chills or fevers, but admits having lower extremities swelling for few days now.  Currently, patient is lying comfortably in bed, no other complaints.  Patient lives at home by herself and has HHA       Gastroenteritis - resolved   -IV fluid x 12-24  -hold Mg Ox supplement for now   -no need to check for cdiff - no recent hospitalization or antibiotic use     Pedal edema - not present on PE and no evidence of HF  - check 2D echo and TSH   -chronic interstitial infiltrates on cxr - reviewed myself - no sob or pain    P. A. Fib / HTN   -hold diuretic for now   -c/w Metoprolol, Lisinopril and Xarelto .  . Her daughter visits her frequently. She walks with a walker. and has HHA    ambulate pt - if stable can dc home with HHA  discussed with team

## 2018-08-27 NOTE — ED ADULT NURSE REASSESSMENT NOTE - NS ED NURSE REASSESS COMMENT FT1
patient reassessed, observed resting in bed. no indication of pain or discomfort. no complaint of diarrhea voiced. contact isolation maintained.   fall and safety precautions maintained

## 2018-08-27 NOTE — PROGRESS NOTE ADULT - SUBJECTIVE AND OBJECTIVE BOX
HOSPITALIST ATTENDING NOTE    STEPH DAI  89y Female  [id number]    INTERVAL HPI/OVERNIGHT EVENTS: no further diarrhea, eating well     T(C): 36.2 (08-27-18 @ 07:50), Max: 36.2 (08-27-18 @ 07:50)  HR: 58 (08-27-18 @ 07:50) (56 - 65)  BP: 160/70 (08-27-18 @ 07:50) (134/64 - 160/70)  RR: 18 (08-27-18 @ 07:50) (18 - 18)  SpO2: 100% (08-27-18 @ 07:50) (95% - 100%)  Wt(kg): --      PHYSICAL EXAM:  GENERAL: NAD, elderly   HEAD:  Atraumatic, Normocephalic  EYES: EOMI, PERRLA, conjunctiva and sclera clear  ENMT: No tonsillar erythema, exudates, or enlargement  NECK: Supple, No JVD, Normal thyroid  NERVOUS SYSTEM:  Alert & Oriented X3, Good concentration; Non-focal- Motor Strength 5/5 B/L upper and lower extremities;  CHEST/LUNG: Clear to ascultation bilaterally; No rales, rhonchi, wheezing,   HEART: Regular rate and rhythm; No murmurs, rubs, or gallops  ABDOMEN: Soft, Nontender, Nondistended; Bowel sounds present  EXTREMITIES: No clubbing, cyanosis, + 1 edema  SKIN: No rashes or lesions  PSYCH: No suicidal/homicial ideation/hallucinations    Consultant(s) Notes Reviewed:  [x ] YES  [ ] NO  Care Discussed with Consultants/Other Providers/ Housestaff [ x] YES  [ ] NO    LABS:                        11.3   6.00  )-----------( 119      ( 27 Aug 2018 06:02 )             33.1     08-27    138  |  97<L>  |  16  ----------------------------<  86  4.0   |  23  |  0.9    Ca    8.8      27 Aug 2018 06:02  Mg     1.7     08-27    TPro  7.5  /  Alb  4.2  /  TBili  0.6  /  DBili  <0.2  /  AST  20  /  ALT  8   /  AlkPhos  65  08-25          RADIOLOGY & ADDITIONAL TESTS:    Imaging or report Personally Reviewed:  [ ] YES  [ ] NO    Case discussed with resident    Care discussed with pt/family      HEALTH ISSUES - PROBLEM Dx:  Suspected deep vein thrombosis

## 2018-08-27 NOTE — PROGRESS NOTE ADULT - SUBJECTIVE AND OBJECTIVE BOX
Patient is a 89y old  Female who presents with a chief complaint of Acute Diarrhea of 1 day (26 Aug 2018 02:07)      PAST MEDICAL & SURGICAL HISTORY:  Dyslipidemia  Hypertension  Atrial fibrillation  H/O abdominal hysterectomy  History of cholecystectomy      MEDICATIONS  (STANDING):  calcium carbonate 1250 mG  + Vitamin D (OsCal 500 + D) 1 Tablet(s) Oral daily  ferrous    sulfate 325 milliGRAM(s) Oral daily  hydrochlorothiazide 12.5 milliGRAM(s) Oral daily  lisinopril 40 milliGRAM(s) Oral daily  metoprolol tartrate 25 milliGRAM(s) Oral two times a day  pantoprazole    Tablet 40 milliGRAM(s) Oral before breakfast  rivaroxaban 10 milliGRAM(s) Oral every 24 hours    MEDICATIONS  (PRN):      Overnight events:    Vital Signs Last 24 Hrs  T(C): 36.2 (27 Aug 2018 07:50), Max: 36.2 (27 Aug 2018 07:50)  T(F): 97.2 (27 Aug 2018 07:50), Max: 97.2 (27 Aug 2018 07:50)  HR: 58 (27 Aug 2018 07:50) (58 - 65)  BP: 160/70 (27 Aug 2018 07:50) (141/60 - 160/70)  BP(mean): --  RR: 18 (27 Aug 2018 07:50) (18 - 18)  SpO2: 100% (27 Aug 2018 07:50) (95% - 100%)  CAPILLARY BLOOD GLUCOSE        I&O's Summary    PHYSICAL EXAM:  GENERAL: NAD, speaks in full sentences, no signs of respiratory distress  HEAD:  Atraumatic, Normocephalic  EYES: EOMI, PERRLA, conjunctiva and sclera clear  NECK: Supple, No JVD  CHEST/LUNG: Clear to auscultation bilaterally; No wheeze; No crackles; No accessory muscles used  HEART: s1s2 no murmur   ABDOMEN: Soft, Nontender, Nondistended; Bowel sounds present; No guarding  EXTREMITIES:  2+ Peripheral Pulses, No cyanosis or edema  PSYCH: AAOx3  NEUROLOGY: non-focal  SKIN: No rashes or lesions        Labs:                        11.3   6.00  )-----------( 119      ( 27 Aug 2018 06:02 )             33.1             08-27    138  |  97<L>  |  16  ----------------------------<  86  4.0   |  23  |  0.9    Ca    8.8      27 Aug 2018 06:02  Mg     1.7         TPro  7.5  /  Alb  4.2  /  TBili  0.6  /  DBili  <0.2  /  AST  20  /  ALT  8   /  AlkPhos  65      LIVER FUNCTIONS - ( 25 Aug 2018 20:00 )  Alb: 4.2 g/dL / Pro: 7.5 g/dL / ALK PHOS: 65 U/L / ALT: 8 U/L / AST: 20 U/L / GGT: x                         Urinalysis Basic - ( 25 Aug 2018 22:02 )    Color: Yellow / Appearance: Clear / S.010 / pH: x  Gluc: x / Ketone: Negative  / Bili: Negative / Urobili: 0.2 mg/dL   Blood: x / Protein: Negative mg/dL / Nitrite: Negative   Leuk Esterase: Negative / RBC: x / WBC x   Sq Epi: x / Non Sq Epi: x / Bacteria: x          Imaging:    ECG:    T(C): 36.2 (18 @ 07:50), Max: 36.2 (18 @ 07:50)  HR: 58 (18 @ 07:50) (58 - 65)  BP: 160/70 (18 @ 07:50) (141/60 - 160/70)  RR: 18 (18 @ 07:50) (18 - 18)  SpO2: 100% (18 @ 07:50) (95% - 100%)

## 2018-08-28 ENCOUNTER — TRANSCRIPTION ENCOUNTER (OUTPATIENT)
Age: 83
End: 2018-08-28

## 2018-08-28 LAB
ANION GAP SERPL CALC-SCNC: 12 MMOL/L — SIGNIFICANT CHANGE UP (ref 7–14)
BASOPHILS # BLD AUTO: 0.02 K/UL — SIGNIFICANT CHANGE UP (ref 0–0.2)
BASOPHILS NFR BLD AUTO: 0.2 % — SIGNIFICANT CHANGE UP (ref 0–1)
BUN SERPL-MCNC: 15 MG/DL — SIGNIFICANT CHANGE UP (ref 10–20)
CALCIUM SERPL-MCNC: 8.8 MG/DL — SIGNIFICANT CHANGE UP (ref 8.5–10.1)
CHLORIDE SERPL-SCNC: 98 MMOL/L — SIGNIFICANT CHANGE UP (ref 98–110)
CO2 SERPL-SCNC: 27 MMOL/L — SIGNIFICANT CHANGE UP (ref 17–32)
CREAT SERPL-MCNC: 0.8 MG/DL — SIGNIFICANT CHANGE UP (ref 0.7–1.5)
EOSINOPHIL # BLD AUTO: 0.06 K/UL — SIGNIFICANT CHANGE UP (ref 0–0.7)
EOSINOPHIL NFR BLD AUTO: 0.7 % — SIGNIFICANT CHANGE UP (ref 0–8)
GLUCOSE BLDC GLUCOMTR-MCNC: 115 MG/DL — HIGH (ref 70–99)
GLUCOSE BLDC GLUCOMTR-MCNC: 121 MG/DL — HIGH (ref 70–99)
GLUCOSE BLDC GLUCOMTR-MCNC: 130 MG/DL — HIGH (ref 70–99)
GLUCOSE SERPL-MCNC: 104 MG/DL — HIGH (ref 70–99)
HCT VFR BLD CALC: 34.4 % — LOW (ref 37–47)
HGB BLD-MCNC: 11.7 G/DL — LOW (ref 12–16)
IMM GRANULOCYTES NFR BLD AUTO: 0.6 % — HIGH (ref 0.1–0.3)
LYMPHOCYTES # BLD AUTO: 1.09 K/UL — LOW (ref 1.2–3.4)
LYMPHOCYTES # BLD AUTO: 12.1 % — LOW (ref 20.5–51.1)
MAGNESIUM SERPL-MCNC: 2 MG/DL — SIGNIFICANT CHANGE UP (ref 1.8–2.4)
MAGNESIUM SERPL-MCNC: 2.3 MG/DL — SIGNIFICANT CHANGE UP (ref 1.8–2.4)
MCHC RBC-ENTMCNC: 29.6 PG — SIGNIFICANT CHANGE UP (ref 27–31)
MCHC RBC-ENTMCNC: 34 G/DL — SIGNIFICANT CHANGE UP (ref 32–37)
MCV RBC AUTO: 87.1 FL — SIGNIFICANT CHANGE UP (ref 81–99)
MONOCYTES # BLD AUTO: 0.64 K/UL — HIGH (ref 0.1–0.6)
MONOCYTES NFR BLD AUTO: 7.1 % — SIGNIFICANT CHANGE UP (ref 1.7–9.3)
NEUTROPHILS # BLD AUTO: 7.13 K/UL — HIGH (ref 1.4–6.5)
NEUTROPHILS NFR BLD AUTO: 79.3 % — HIGH (ref 42.2–75.2)
NRBC # BLD: 0 /100 WBCS — SIGNIFICANT CHANGE UP (ref 0–0)
PLATELET # BLD AUTO: 186 K/UL — SIGNIFICANT CHANGE UP (ref 130–400)
POTASSIUM SERPL-MCNC: 3.5 MMOL/L — SIGNIFICANT CHANGE UP (ref 3.5–5)
POTASSIUM SERPL-SCNC: 3.5 MMOL/L — SIGNIFICANT CHANGE UP (ref 3.5–5)
RBC # BLD: 3.95 M/UL — LOW (ref 4.2–5.4)
RBC # FLD: 13.2 % — SIGNIFICANT CHANGE UP (ref 11.5–14.5)
SODIUM SERPL-SCNC: 137 MMOL/L — SIGNIFICANT CHANGE UP (ref 135–146)
WBC # BLD: 8.99 K/UL — SIGNIFICANT CHANGE UP (ref 4.8–10.8)
WBC # FLD AUTO: 8.99 K/UL — SIGNIFICANT CHANGE UP (ref 4.8–10.8)

## 2018-08-28 RX ORDER — MAGNESIUM OXIDE 400 MG ORAL TABLET 241.3 MG
1 TABLET ORAL
Qty: 0 | Refills: 0 | COMMUNITY

## 2018-08-28 RX ADMIN — Medication 12.5 MILLIGRAM(S): at 06:01

## 2018-08-28 RX ADMIN — LISINOPRIL 40 MILLIGRAM(S): 2.5 TABLET ORAL at 06:01

## 2018-08-28 RX ADMIN — Medication 325 MILLIGRAM(S): at 11:04

## 2018-08-28 RX ADMIN — Medication 25 MILLIGRAM(S): at 18:03

## 2018-08-28 RX ADMIN — Medication 100 MILLIGRAM(S): at 18:04

## 2018-08-28 RX ADMIN — Medication 100 MILLIGRAM(S): at 06:02

## 2018-08-28 RX ADMIN — Medication 25 MILLIGRAM(S): at 06:02

## 2018-08-28 RX ADMIN — PANTOPRAZOLE SODIUM 40 MILLIGRAM(S): 20 TABLET, DELAYED RELEASE ORAL at 11:01

## 2018-08-28 RX ADMIN — Medication 1 TABLET(S): at 12:48

## 2018-08-28 RX ADMIN — RIVAROXABAN 10 MILLIGRAM(S): KIT at 18:04

## 2018-08-28 NOTE — PHYSICAL THERAPY INITIAL EVALUATION ADULT - CRITERIA FOR SKILLED THERAPEUTIC INTERVENTIONS
impairments found/predicted duration of therapy intervention/functional limitations in following categories/risk reduction/prevention/rehab potential/therapy frequency

## 2018-08-28 NOTE — PHYSICAL THERAPY INITIAL EVALUATION ADULT - REFERRING PHYSICIAN, REHAB EVAL
Medicine Medicine; Consulted with Dr. Berry who reports pt does not have DVT and can participate with PT.

## 2018-08-28 NOTE — DISCHARGE NOTE ADULT - PLAN OF CARE
Medical optimization to prevent further episodes and prevent dehydration Practice good hygiene (wash hands frequently)  Avoid overuse of laxatives  follow up with PMD Dr. Jack castillo within 1-2 weeks  Do not ambulate without supervision (fall risk) Optimize medical treatment to prevent symptoms and thromboembolic phenomenon (clot formation) continue xarelto  continue metoprolol  follow up with your PMD within 1-2 weeks Optimization of medical treatment to maintain BP <=130/80 continue lisinopril  continue HCTZ   Avoid HCTZ (diuretic) if diarrhea recurs or PO intake decreases  DASH (low sodium low cholesterol) diet  Follow up with PMD (Dr. Santiago) within 1-2 weeks

## 2018-08-28 NOTE — DISCHARGE NOTE ADULT - CARE PROVIDERS DIRECT ADDRESSES
[email or web address]

## 2018-08-28 NOTE — PROGRESS NOTE ADULT - ASSESSMENT
89 yo female patient with PMHx of paroxysmal AF on Xarelto, HTN, DL, presented from home because of acute onset diarrhea of 1 day duration.  Patient says she started experiencing watery non bloody diarrhea since the morning, multiple episodes (around 4 episodes), associated with abdominal cramping. No nausea or vomiting. Yesterday she ate home made salad with lemon oil dressing, no lebron. Patient denies chills or fevers, but admits having lower extremities swelling for few days now.  Currently, patient is lying comfortably in bed, no other complaints.  Patient lives at home by herself and has HHA      #Watery diarrhea- resolved   -IV fluid x 12-24  -hold Mg Ox supplement for now   -no need to check for cdiff - no recent hospitalization or antibiotic use     Pedal edema - not present on PE and no evidence of HF  - check 2D echo and TSH   -chronic interstitial infiltrates on cxr - reviewed myself - no sob or pain    P. A. Fib / HTN   -hold diuretic for now   -c/w Metoprolol, Lisinopril and Xarelto .  . Her daughter visits her frequently. She walks with a walker. and has HHA    ambulate pt - if stable can dc home with HHA 89 yo female patient with PMHx of paroxysmal AF on Xarelto, HTN, DL, presented from home because of acute onset diarrhea of 1 day duration.  Patient says she started experiencing watery non bloody diarrhea since the morning, multiple episodes (around 4 episodes), associated with abdominal cramping. No nausea or vomiting. Yesterday she ate home made salad with lemon oil dressing, no lebron. Patient denies chills or fevers, but admits having lower extremities swelling for few days now.  Currently, patient is lying comfortably in bed, no other complaints.  Patient lives at home by herself and has HHA    #Episodes of watery diarrhea- likely 2/2 viral gastroenteritis vs. magnesium oxide intake   -resolved today (1 formed stool today)  -hold Mg Ox supplement for now (mag 2.0)  -no need to check for CDiff- no recent hospitalization or antibiotic use   -PO     Pedal edema - not present on PE and no evidence of HF  - check 2D echo and TSH   -chronic interstitial infiltrates on cxr - reviewed myself - no sob or pain    P. A. Fib / HTN   -hold diuretic for now   -c/w Metoprolol, Lisinopril and Xarelto .  . Her daughter visits her frequently. She walks with a walker. and has HHA    ambulate pt - if stable can dc home with A 89 yo female patient with PMHx of paroxysmal AF on Xarelto, HTN, DL, presented from home because of acute onset diarrhea of 1 day duration.   No nausea or vomiting. Currently, patient is lying comfortably in bed, no other complaints.  Patient lives at home by herself and has HHA    #Episodes of watery diarrhea- likely 2/2 viral gastroenteritis vs. magnesium oxide intake   -No fever, chills, white count  -resolved today (1 formed stool today)  -hold Mg Ox supplement for now (mag 2.0)  -no need to check for CDiff- no recent hospitalization or antibiotic use   -PO intake adequate- D/C'ed fluids  -Electrolytes normal.     #Pedal edema  -did not appreciate any LE edema today.  -Will restart the HCTZ at home     #A Fib / HTN   -held diuretic during the hospital d/t decreased PO intake and risk for hypotension  -c/w Metoprolol, Lisinopril and Xarelto.     #DVT ppx: on Xarelto  Activity: Walks with walker  D/C today to home with HHA 89 yo female patient with PMHx of paroxysmal AF on Xarelto, HTN, DL, presented from home because of acute onset diarrhea of 1 day duration.   No nausea or vomiting. Currently, patient is lying comfortably in bed, no other complaints.  Patient lives at home by herself and has HHA    #Episodes of watery diarrhea- likely 2/2 viral gastroenteritis vs. magnesium oxide intake -resolved  -No fever, chills, white count  -resolved today (1 formed stool today)  -hold Mg Ox supplement for now (mag 2.0)  -no need to check for CDiff- no recent hospitalization or antibiotic use   -PO intake adequate- D/C'ed fluids  -Electrolytes normal.     # paroxysmal A Fib / HTN   -c/w Metoprolol, HCTZ, Lisinopril and Xarelto.     #DVT ppx: on Xarelto  Activity: Walks with walker    Prepare for discharge

## 2018-08-28 NOTE — DISCHARGE NOTE ADULT - MEDICATION SUMMARY - MEDICATIONS TO STOP TAKING
I will STOP taking the medications listed below when I get home from the hospital:    magnesium oxide 500 mg oral tablet  -- 1 tab(s) by mouth 2 times a day

## 2018-08-28 NOTE — DISCHARGE NOTE ADULT - MEDICATION SUMMARY - MEDICATIONS TO TAKE
I will START or STAY ON the medications listed below when I get home from the hospital:    lisinopril 40 mg oral tablet  -- 1 tab(s) by mouth once a day  -- Indication: For HTN    Xarelto 10 mg oral tablet  -- 1 tab(s) by mouth once a day  -- Indication: For Atrial fibrillation    metoprolol tartrate 25 mg oral tablet  -- 1 tab(s) by mouth 2 times a day  -- Indication: For Atrial fibrillation    hydroCHLOROthiazide 12.5 mg oral tablet  -- 1 tab(s) by mouth once a day  -- Indication: For HTN    ferrous sulfate 325 mg (65 mg elemental iron) oral tablet  -- 1 tab(s) by mouth 2 times a day  -- Indication: For Iron deficiency anemia    Calcium 500+D oral tablet, chewable  -- 1 tab(s) by mouth 3 times a day  -- Indication: For vitamin D deficiency

## 2018-08-28 NOTE — PROGRESS NOTE ADULT - SUBJECTIVE AND OBJECTIVE BOX
LENGTH OF HOSPITAL STAY: 2d    CHIEF COMPLAINT:   Patient is a 89y old  Female who presents with a chief complaint of Acute Diarrhea of 1 day (26 Aug 2018 02:07)      INTERVAL HISTORY  No acute events overnight. Pt had one formed BM today. No abdominal pain, fever, chills. Pt walks with the help of a walker. She wants changes made to her diet (low salt, lactose free)    ALLERGIES:  penicillin (Rash)    MEDICATIONS:  STANDING MEDICATIONS  calcium carbonate 1250 mG  + Vitamin D (OsCal 500 + D) 1 Tablet(s) Oral daily  docusate sodium 100 milliGRAM(s) Oral two times a day  ferrous    sulfate 325 milliGRAM(s) Oral daily  hydrochlorothiazide 12.5 milliGRAM(s) Oral daily  lisinopril 40 milliGRAM(s) Oral daily  metoprolol tartrate 25 milliGRAM(s) Oral two times a day  pantoprazole    Tablet 40 milliGRAM(s) Oral before breakfast  rivaroxaban 10 milliGRAM(s) Oral every 24 hours    PRN MEDICATIONS    VITALS:   T(F): 98.1  HR: 89  BP: 114/78  RR: 18  SpO2: 97%    LABS:                        11.7   8.99  )-----------( 186      ( 28 Aug 2018 07:59 )             34.4     08-28    137  |  98  |  15  ----------------------------<  104<H>  3.5   |  27  |  0.8    Ca    8.8      28 Aug 2018 07:59  Mg     2.0     08-28      RADIOLOGY:  < from: [facility] Duplex Lower Ext Vein Scan, Bilat (08.26.18 @ 07:47) >  Impression:    No evidence of deep venous thrombosis or superficial thrombophlebitis in   the bilateral lower extremities.    < end of copied text >    < from: CT Abdomen and Pelvis w/ Oral Cont and w/ IV Cont (08.25.18 @ 22:58) >  IMPRESSION:    No CT evidence of acute intra-abdominal pathology.    Increasing CBD dilatation. If clinically warranted, MRCP may be of   benefit.    No evidence of small bowel obstruction      < end of copied text >    < from: Xray Chest 1 View AP/PA (08.25.18 @ 20:27) >  IMPRESSION:     No radiographic evidence of acute cardiopulmonary disease.      < end of copied text >    PHYSICAL EXAM:  GEN: No acute distress  LUNGS: Clear to auscultation bilaterally   HEART: S1/S2 present. RRR.   ABD: Soft, non-tender, non-distended. Bowel sounds present  EXT: within normal limits  NEURO: AAOX3 LENGTH OF HOSPITAL STAY: 2d    CHIEF COMPLAINT:   Patient is a 89y old  Female who presents with a chief complaint of Acute Diarrhea of 1 day (26 Aug 2018 02:07)      INTERVAL HISTORY  No acute events overnight. Pt had one formed BM today. No abdominal pain, fever, chills. Pt walks with the help of a walker. She wants changes made to her diet (low salt, lactose free)    ALLERGIES:  penicillin (Rash)    MEDICATIONS:  STANDING MEDICATIONS  calcium carbonate 1250 mG  + Vitamin D (OsCal 500 + D) 1 Tablet(s) Oral daily  docusate sodium 100 milliGRAM(s) Oral two times a day  ferrous    sulfate 325 milliGRAM(s) Oral daily  hydrochlorothiazide 12.5 milliGRAM(s) Oral daily  lisinopril 40 milliGRAM(s) Oral daily  metoprolol tartrate 25 milliGRAM(s) Oral two times a day  pantoprazole    Tablet 40 milliGRAM(s) Oral before breakfast  rivaroxaban 10 milliGRAM(s) Oral every 24 hours    PRN MEDICATIONS    VITALS:   T(F): 98.1  HR: 89  BP: 114/78  RR: 18  SpO2: 97%    LABS:                        11.7   8.99  )-----------( 186      ( 28 Aug 2018 07:59 )             34.4     08-28    137  |  98  |  15  ----------------------------<  104<H>  3.5   |  27  |  0.8    Ca    8.8      28 Aug 2018 07:59  Mg     2.0     08-28      RADIOLOGY:  < from: VA Duplex Lower Ext Vein Scan, Bilat (08.26.18 @ 07:47) >  Impression:    No evidence of deep venous thrombosis or superficial thrombophlebitis in   the bilateral lower extremities.    < end of copied text >    < from: CT Abdomen and Pelvis w/ Oral Cont and w/ IV Cont (08.25.18 @ 22:58) >  IMPRESSION:    No CT evidence of acute intra-abdominal pathology.    Increasing CBD dilatation. If clinically warranted, MRCP may be of   benefit.    No evidence of small bowel obstruction      < end of copied text >    < from: Xray Chest 1 View AP/PA (08.25.18 @ 20:27) >  IMPRESSION:     No radiographic evidence of acute cardiopulmonary disease.      < end of copied text >    PHYSICAL EXAM:  GEN: No acute distress, sitting in chair  CHEST: Clear to auscultation bilaterally   CVS: S1/S2 normal, no murmur, rubs, gallops  ABD: Soft, non-tender, non-distended. Bowel sounds present  EXT: within normal limits  SKIN: No pressure ulcers, erythema, swelling, warmth  NEURO: AAOX3 LENGTH OF HOSPITAL STAY: 2d    CHIEF COMPLAINT:   Patient is a 89y old  Female who presents with a chief complaint of Acute Diarrhea of 1 day (26 Aug 2018 02:07)      INTERVAL HISTORY  No acute events overnight. Pt had one formed BM today. No abdominal pain, fever, chills. Pt walks with the help of a walker. She wants changes made to her diet (low salt, lactose free)    ALLERGIES:  penicillin (Rash)    MEDICATIONS:  STANDING MEDICATIONS  calcium carbonate 1250 mG  + Vitamin D (OsCal 500 + D) 1 Tablet(s) Oral daily  docusate sodium 100 milliGRAM(s) Oral two times a day  ferrous    sulfate 325 milliGRAM(s) Oral daily  hydrochlorothiazide 12.5 milliGRAM(s) Oral daily  lisinopril 40 milliGRAM(s) Oral daily  metoprolol tartrate 25 milliGRAM(s) Oral two times a day  pantoprazole    Tablet 40 milliGRAM(s) Oral before breakfast  rivaroxaban 10 milliGRAM(s) Oral every 24 hours    PRN MEDICATIONS    VITALS:   T(F): 98.1  HR: 89  BP: 114/78  RR: 18  SpO2: 97%    LABS:                        11.7   8.99  )-----------( 186      ( 28 Aug 2018 07:59 )             34.4     08-28    137  |  98  |  15  ----------------------------<  104<H>  3.5   |  27  |  0.8    Ca    8.8      28 Aug 2018 07:59  Mg     2.0     08-28      RADIOLOGY:  < from: VA Duplex Lower Ext Vein Scan, Bilat (08.26.18 @ 07:47) >  Impression:    No evidence of deep venous thrombosis or superficial thrombophlebitis in   the bilateral lower extremities.    < end of copied text >    < from: CT Abdomen and Pelvis w/ Oral Cont and w/ IV Cont (08.25.18 @ 22:58) >  IMPRESSION:    No CT evidence of acute intra-abdominal pathology.    Increasing CBD dilatation. If clinically warranted, MRCP may be of   benefit.    No evidence of small bowel obstruction      < end of copied text >    < from: Xray Chest 1 View AP/PA (08.25.18 @ 20:27) >  IMPRESSION:     No radiographic evidence of acute cardiopulmonary disease.      < end of copied text >    PHYSICAL EXAM:  GEN: No acute distress, sitting in chair  CHEST: Clear to auscultation bilaterally   CVS: S1/S2 normal, no murmur, rubs, gallops  ABD: Soft, non-tender, non-distended. Bowel sounds present  EXT: within normal limits, no LE edema seen  SKIN: No pressure ulcers, erythema, swelling, warmth  NEURO: AAOX3

## 2018-08-28 NOTE — PHYSICAL THERAPY INITIAL EVALUATION ADULT - PERTINENT HX OF CURRENT PROBLEM, REHAB EVAL
Pt adm for diarrhea. Pt reports she was a long term resident at [facility] but signed herself out as she had a negative experience there.

## 2018-08-28 NOTE — DISCHARGE NOTE ADULT - HOSPITAL COURSE
89 yo female patient with PMHx of paroxysmal AF on Xarelto, HTN, DL, presented from home because of acute onset diarrhea of 1 day duration.   No nausea or vomiting, fever, chills.     #Episodes of watery diarrhea- likely 2/2 viral gastroenteritis vs. magnesium oxide intake   -No fever, chills, white count  -held Mg Ox supplement  (mag 2.0 on 08/28)  -no need to check for CDiff- no recent hospitalization or antibiotic use   -Was on IV fluids (LR) for gentle hydration, transitioned to PO fluid intake  -Electrolytes normal.     #Pedal edema  -did not appreciate any LE edema today (08/28)  -Will restart the HCTZ at home (held the diuretic in the hospital)    #A Fib / HTN   -held diuretic during the hospital d/t decreased PO intake and risk for hypotension  -c/w Metoprolol, Lisinopril and Xarelto.     Activity: Walks with walker  Will be discharged to home with A 89 yo female patient with PMHx of paroxysmal AF on Xarelto, HTN, DL, presented from home because of acute onset diarrhea of 1 day duration.   No nausea or vomiting, fever, chills.     #Episodes of  diarrhea-  probably sec to magnesium oxide intake   -No fever, chills, white count  -held Mg Ox supplement  (mag 2.0 on 08/28)  -no need to check for CDiff- no recent hospitalization or antibiotic use .  - Diarrhea resolved  -Was on IV fluids (LR) for gentle hydration, transitioned to PO fluid intake  -Electrolytes normal.     #Pedal edema  - c/w HCTZ     #A Fib / HTN   -c/w Metoprolol, Lisinopril and Xarelto.     Activity: Walks with walker  Will be discharged to home with A

## 2018-08-28 NOTE — DISCHARGE NOTE ADULT - CARE PLAN
Principal Discharge DX:	Diarrhea, unspecified type  Goal:	Medical optimization to prevent further episodes and prevent dehydration  Assessment and plan of treatment:	Practice good hygiene (wash hands frequently)  Avoid overuse of laxatives  follow up with PMD Dr. Jack castillo within 1-2 weeks  Do not ambulate without supervision (fall risk)  Secondary Diagnosis:	Atrial fibrillation  Goal:	Optimize medical treatment to prevent symptoms and thromboembolic phenomenon (clot formation)  Assessment and plan of treatment:	continue xarelto  continue metoprolol  follow up with your PMD within 1-2 weeks  Secondary Diagnosis:	Hypertension  Goal:	Optimization of medical treatment to maintain BP <=130/80  Assessment and plan of treatment:	continue lisinopril  continue HCTZ   Avoid HCTZ (diuretic) if diarrhea recurs or PO intake decreases  DASH (low sodium low cholesterol) diet  Follow up with PMD (Dr. Santiago) within 1-2 weeks

## 2018-08-28 NOTE — DISCHARGE NOTE ADULT - PATIENT PORTAL LINK FT
You can access the [facility], offered by [facility], by registering with the following website: [email or web address]

## 2018-08-28 NOTE — DISCHARGE NOTE ADULT - ADDITIONAL INSTRUCTIONS
Stop the magnesium oxide supplement at home (may exacerbate diarrhea)  Avoid the overuse of laxatives  Encourage PO fluid intake  Follow up with Dr. Santiago (PMD) within 1-2 weeks  Ambulate with walker under supervision

## 2018-08-29 VITALS
SYSTOLIC BLOOD PRESSURE: 130 MMHG | HEART RATE: 62 BPM | DIASTOLIC BLOOD PRESSURE: 63 MMHG | RESPIRATION RATE: 20 BRPM | TEMPERATURE: 97 F | OXYGEN SATURATION: 99 %

## 2018-08-29 LAB — GLUCOSE BLDC GLUCOMTR-MCNC: 127 MG/DL — HIGH (ref 70–99)

## 2018-08-29 RX ADMIN — PANTOPRAZOLE SODIUM 40 MILLIGRAM(S): 20 TABLET, DELAYED RELEASE ORAL at 08:49

## 2018-08-29 RX ADMIN — Medication 25 MILLIGRAM(S): at 05:51

## 2018-08-29 RX ADMIN — Medication 325 MILLIGRAM(S): at 11:38

## 2018-08-29 RX ADMIN — Medication 100 MILLIGRAM(S): at 05:51

## 2018-08-29 RX ADMIN — LISINOPRIL 40 MILLIGRAM(S): 2.5 TABLET ORAL at 05:51

## 2018-08-29 RX ADMIN — Medication 1 TABLET(S): at 11:39

## 2018-08-29 RX ADMIN — Medication 12.5 MILLIGRAM(S): at 05:51

## 2018-08-29 NOTE — PROGRESS NOTE ADULT - ASSESSMENT
89 yo female patient with PMHx of paroxysmal AF on Xarelto, HTN, DL, presented from home because of acute onset diarrhea of 1 day duration.   No nausea or vomiting. Currently, patient is lying comfortably in bed, no other complaints.  Patient lives at home by herself and has HHA    #Episodes of watery diarrhea- likely 2/2 viral gastroenteritis vs. magnesium oxide intake -resolved  -No fever, chills, white count  -hold Mg Ox supplement for now (mag 2.0)  -no need to check for CDiff- no recent hospitalization or antibiotic use   -PO intake adequate- D/C'ed fluids  -Electrolytes normal.     # paroxysmal A Fib / HTN   -c/w Metoprolol, HCTZ, Lisinopril and Xarelto.     #DVT ppx: on Xarelto  Activity: Walks with walker    D/C to home with HHA

## 2018-08-29 NOTE — PROGRESS NOTE ADULT - SUBJECTIVE AND OBJECTIVE BOX
STEPH DAI, female, 89y ([birth year]),   MRN-[id number]  Admit Date: 08-26-18 (3d)      Chief Complaint:  Patient is a 89y old  Female who presents with a chief complaint of Acute Diarrhea for 1 day (28 Aug 2018 11:22)      Interval History:  No acute events overnight. No complaints at this time. Pt is feeling better, no diarrhea.    Past Medical and Surgical History:  PAST MEDICAL & SURGICAL HISTORY:  Dyslipidemia  Hypertension  Atrial fibrillation  H/O abdominal hysterectomy  History of cholecystectomy      Current Medications:  MEDICATIONS  (STANDING):  calcium carbonate 1250 mG  + Vitamin D (OsCal 500 + D) 1 Tablet(s) Oral daily  docusate sodium 100 milliGRAM(s) Oral two times a day  ferrous    sulfate 325 milliGRAM(s) Oral daily  hydrochlorothiazide 12.5 milliGRAM(s) Oral daily  lisinopril 40 milliGRAM(s) Oral daily  metoprolol tartrate 25 milliGRAM(s) Oral two times a day  pantoprazole    Tablet 40 milliGRAM(s) Oral before breakfast  rivaroxaban 10 milliGRAM(s) Oral every 24 hours    MEDICATIONS  (PRN):        Vital Signs:  T(F): 97.2 (08-29-18 @ 07:33), Max: 98.1 (08-28-18 @ 07:34)  HR: 62 (08-29-18 @ 07:33) (61 - 100)  BP: 130/63 (08-29-18 @ 07:33) (11/60 - 177/79)  RR: 20 (08-29-18 @ 07:33) (18 - 20)  SpO2: 99% (08-29-18 @ 07:33) (96% - 99%)  CAPILLARY BLOOD GLUCOSE      POCT Blood Glucose.: 127 mg/dL (29 Aug 2018 11:04)  POCT Blood Glucose.: 130 mg/dL (28 Aug 2018 21:08)  POCT Blood Glucose.: 115 mg/dL (28 Aug 2018 16:21)      Physical Exam:  General: Not in distress.   HEENT: Moist mucus membranes. PERRLA.  Cardio: Regular rate and rhythm, S1, S2, no murmur, rub, or gallop.  Pulm: Clear to auscultation bilaterally. No wheezing, rales, or rhonchi.  Abdomen: Soft, non-tender, non-distended. Normoactive bowel sounds.  Extremities: No cyanosis or edema bilaterally. No calf tenderness to palpation. No skin changes (rash, ulcers)  Neuro: A&O x3. No focal deficits. CN II - XII grossly intact.    Labs and Imaging:  CBC Full  -  ( 28 Aug 2018 07:59 )  WBC Count : 8.99 K/uL  Hemoglobin : 11.7 g/dL  Hematocrit : 34.4 %  Platelet Count - Automated : 186 K/uL  Mean Cell Volume : 87.1 fL  Mean Cell Hemoglobin : 29.6 pg  Mean Cell Hemoglobin Concentration : 34.0 g/dL  Auto Neutrophil # : 7.13 K/uL  Auto Lymphocyte # : 1.09 K/uL  Auto Monocyte # : 0.64 K/uL  Auto Eosinophil # : 0.06 K/uL  Auto Basophil # : 0.02 K/uL  Auto Neutrophil % : 79.3 %  Auto Lymphocyte % : 12.1 %  Auto Monocyte % : 7.1 %  Auto Eosinophil % : 0.7 %  Auto Basophil % : 0.2 %        Home Medications:  Home Medications:  Calcium 500+D oral tablet, chewable: 1 tab(s) orally 3 times a day (26 Aug 2018 02:13)  ferrous sulfate 325 mg (65 mg elemental iron) oral tablet: 1 tab(s) orally 2 times a day (26 Aug 2018 02:14)  hydroCHLOROthiazide 12.5 mg oral tablet: 1 tab(s) orally once a day (26 Aug 2018 02:13)  lisinopril 40 mg oral tablet: 1 tab(s) orally once a day (26 Aug 2018 02:13)  metoprolol tartrate 25 mg oral tablet: 1 tab(s) orally 2 times a day (26 Aug 2018 02:13)  Xarelto 10 mg oral tablet: 1 tab(s) orally once a day (26 Aug 2018 02:13)

## 2018-08-31 ENCOUNTER — OUTPATIENT (OUTPATIENT)
Dept: OUTPATIENT SERVICES | Facility: HOSPITAL | Age: 83
LOS: 1 days | Discharge: HOME | End: 2018-08-31

## 2018-08-31 DIAGNOSIS — D64.9 ANEMIA, UNSPECIFIED: ICD-10-CM

## 2018-08-31 DIAGNOSIS — Z83.42 FAMILY HISTORY OF FAMILIAL HYPERCHOLESTEROLEMIA: ICD-10-CM

## 2018-08-31 DIAGNOSIS — R26.0 ATAXIC GAIT: ICD-10-CM

## 2018-08-31 DIAGNOSIS — Z90.49 ACQUIRED ABSENCE OF OTHER SPECIFIED PARTS OF DIGESTIVE TRACT: Chronic | ICD-10-CM

## 2018-08-31 DIAGNOSIS — Z90.710 ACQUIRED ABSENCE OF BOTH CERVIX AND UTERUS: Chronic | ICD-10-CM

## 2018-08-31 PROBLEM — E78.5 HYPERLIPIDEMIA, UNSPECIFIED: Chronic | Status: ACTIVE | Noted: 2018-08-26

## 2018-08-31 PROBLEM — I48.91 UNSPECIFIED ATRIAL FIBRILLATION: Chronic | Status: ACTIVE | Noted: 2018-08-26

## 2018-08-31 PROBLEM — I10 ESSENTIAL (PRIMARY) HYPERTENSION: Chronic | Status: ACTIVE | Noted: 2018-08-26

## 2018-09-11 DIAGNOSIS — I48.0 PAROXYSMAL ATRIAL FIBRILLATION: ICD-10-CM

## 2018-09-11 DIAGNOSIS — Z79.01 LONG TERM (CURRENT) USE OF ANTICOAGULANTS: ICD-10-CM

## 2018-09-11 DIAGNOSIS — I11.0 HYPERTENSIVE HEART DISEASE WITH HEART FAILURE: ICD-10-CM

## 2018-09-11 DIAGNOSIS — E78.5 HYPERLIPIDEMIA, UNSPECIFIED: ICD-10-CM

## 2018-09-11 DIAGNOSIS — R19.7 DIARRHEA, UNSPECIFIED: ICD-10-CM

## 2018-09-11 DIAGNOSIS — T47.1X5A ADVERSE EFFECT OF OTHER ANTACIDS AND ANTI-GASTRIC-SECRETION DRUGS, INITIAL ENCOUNTER: ICD-10-CM

## 2018-09-11 DIAGNOSIS — Z98.890 OTHER SPECIFIED POSTPROCEDURAL STATES: ICD-10-CM

## 2018-09-11 DIAGNOSIS — M19.90 UNSPECIFIED OSTEOARTHRITIS, UNSPECIFIED SITE: ICD-10-CM

## 2018-09-11 DIAGNOSIS — Z88.0 ALLERGY STATUS TO PENICILLIN: ICD-10-CM

## 2018-09-11 DIAGNOSIS — D64.9 ANEMIA, UNSPECIFIED: ICD-10-CM

## 2018-09-11 DIAGNOSIS — R60.0 LOCALIZED EDEMA: ICD-10-CM

## 2018-09-11 DIAGNOSIS — I50.9 HEART FAILURE, UNSPECIFIED: ICD-10-CM

## 2018-09-11 DIAGNOSIS — E11.9 TYPE 2 DIABETES MELLITUS WITHOUT COMPLICATIONS: ICD-10-CM

## 2018-10-09 ENCOUNTER — OUTPATIENT (OUTPATIENT)
Dept: OUTPATIENT SERVICES | Facility: HOSPITAL | Age: 83
LOS: 1 days | Discharge: HOME | End: 2018-10-09

## 2018-10-09 DIAGNOSIS — Z90.710 ACQUIRED ABSENCE OF BOTH CERVIX AND UTERUS: Chronic | ICD-10-CM

## 2018-10-09 DIAGNOSIS — Z90.49 ACQUIRED ABSENCE OF OTHER SPECIFIED PARTS OF DIGESTIVE TRACT: Chronic | ICD-10-CM

## 2018-10-09 DIAGNOSIS — R31.0 GROSS HEMATURIA: ICD-10-CM

## 2019-01-01 ENCOUNTER — APPOINTMENT (OUTPATIENT)
Dept: CARDIOLOGY | Facility: CLINIC | Age: 84
End: 2019-01-01
Payer: MEDICARE

## 2019-01-01 ENCOUNTER — TRANSCRIPTION ENCOUNTER (OUTPATIENT)
Age: 84
End: 2019-01-01

## 2019-01-01 ENCOUNTER — INPATIENT (INPATIENT)
Facility: HOSPITAL | Age: 84
LOS: 5 days | Discharge: HOME HEALTH | End: 2019-09-17
Attending: INTERNAL MEDICINE | Admitting: INTERNAL MEDICINE
Payer: MEDICARE

## 2019-01-01 ENCOUNTER — INPATIENT (INPATIENT)
Facility: HOSPITAL | Age: 84
LOS: 31 days | Discharge: NURSING, REHAB OR LONG-TERM CARE | End: 2019-12-06
Attending: INTERNAL MEDICINE | Admitting: INTERNAL MEDICINE
Payer: MEDICARE

## 2019-01-01 ENCOUNTER — APPOINTMENT (OUTPATIENT)
Dept: CARDIOLOGY | Facility: CLINIC | Age: 84
End: 2019-01-01

## 2019-01-01 ENCOUNTER — OUTPATIENT (OUTPATIENT)
Dept: OUTPATIENT SERVICES | Facility: HOSPITAL | Age: 84
LOS: 1 days | Discharge: HOME | End: 2019-01-01

## 2019-01-01 VITALS
DIASTOLIC BLOOD PRESSURE: 65 MMHG | BODY MASS INDEX: 23.41 KG/M2 | WEIGHT: 124 LBS | HEIGHT: 61 IN | SYSTOLIC BLOOD PRESSURE: 115 MMHG

## 2019-01-01 VITALS
TEMPERATURE: 98 F | HEART RATE: 96 BPM | OXYGEN SATURATION: 97 % | RESPIRATION RATE: 18 BRPM | SYSTOLIC BLOOD PRESSURE: 76 MMHG | DIASTOLIC BLOOD PRESSURE: 50 MMHG

## 2019-01-01 VITALS
SYSTOLIC BLOOD PRESSURE: 137 MMHG | OXYGEN SATURATION: 94 % | DIASTOLIC BLOOD PRESSURE: 96 MMHG | RESPIRATION RATE: 18 BRPM | TEMPERATURE: 98 F | HEART RATE: 77 BPM

## 2019-01-01 VITALS
RESPIRATION RATE: 15 BRPM | DIASTOLIC BLOOD PRESSURE: 79 MMHG | HEART RATE: 88 BPM | SYSTOLIC BLOOD PRESSURE: 166 MMHG | TEMPERATURE: 97 F

## 2019-01-01 VITALS — HEART RATE: 67 BPM | OXYGEN SATURATION: 96 % | RESPIRATION RATE: 18 BRPM | TEMPERATURE: 96 F

## 2019-01-01 DIAGNOSIS — R65.20 SEVERE SEPSIS WITHOUT SEPTIC SHOCK: ICD-10-CM

## 2019-01-01 DIAGNOSIS — R79.89 OTHER SPECIFIED ABNORMAL FINDINGS OF BLOOD CHEMISTRY: ICD-10-CM

## 2019-01-01 DIAGNOSIS — E87.2 ACIDOSIS: ICD-10-CM

## 2019-01-01 DIAGNOSIS — Z90.710 ACQUIRED ABSENCE OF BOTH CERVIX AND UTERUS: Chronic | ICD-10-CM

## 2019-01-01 DIAGNOSIS — N13.30 UNSPECIFIED HYDRONEPHROSIS: ICD-10-CM

## 2019-01-01 DIAGNOSIS — A41.9 SEPSIS, UNSPECIFIED ORGANISM: ICD-10-CM

## 2019-01-01 DIAGNOSIS — I82.B11 ACUTE EMBOLISM AND THROMBOSIS OF RIGHT SUBCLAVIAN VEIN: ICD-10-CM

## 2019-01-01 DIAGNOSIS — Z66 DO NOT RESUSCITATE: ICD-10-CM

## 2019-01-01 DIAGNOSIS — I82.C11 ACUTE EMBOLISM AND THROMBOSIS OF RIGHT INTERNAL JUGULAR VEIN: ICD-10-CM

## 2019-01-01 DIAGNOSIS — E78.5 HYPERLIPIDEMIA, UNSPECIFIED: ICD-10-CM

## 2019-01-01 DIAGNOSIS — N17.9 ACUTE KIDNEY FAILURE, UNSPECIFIED: ICD-10-CM

## 2019-01-01 DIAGNOSIS — Z88.0 ALLERGY STATUS TO PENICILLIN: ICD-10-CM

## 2019-01-01 DIAGNOSIS — E86.0 DEHYDRATION: ICD-10-CM

## 2019-01-01 DIAGNOSIS — Z90.49 ACQUIRED ABSENCE OF OTHER SPECIFIED PARTS OF DIGESTIVE TRACT: Chronic | ICD-10-CM

## 2019-01-01 DIAGNOSIS — K52.9 NONINFECTIVE GASTROENTERITIS AND COLITIS, UNSPECIFIED: ICD-10-CM

## 2019-01-01 DIAGNOSIS — E43 UNSPECIFIED SEVERE PROTEIN-CALORIE MALNUTRITION: ICD-10-CM

## 2019-01-01 DIAGNOSIS — R74.0 NONSPECIFIC ELEVATION OF LEVELS OF TRANSAMINASE AND LACTIC ACID DEHYDROGENASE [LDH]: ICD-10-CM

## 2019-01-01 DIAGNOSIS — A04.72 ENTEROCOLITIS DUE TO CLOSTRIDIUM DIFFICILE, NOT SPECIFIED AS RECURRENT: ICD-10-CM

## 2019-01-01 DIAGNOSIS — Z51.5 ENCOUNTER FOR PALLIATIVE CARE: ICD-10-CM

## 2019-01-01 DIAGNOSIS — I10 ESSENTIAL (PRIMARY) HYPERTENSION: ICD-10-CM

## 2019-01-01 DIAGNOSIS — Z90.710 ACQUIRED ABSENCE OF BOTH CERVIX AND UTERUS: ICD-10-CM

## 2019-01-01 DIAGNOSIS — E83.42 HYPOMAGNESEMIA: ICD-10-CM

## 2019-01-01 DIAGNOSIS — E87.6 HYPOKALEMIA: ICD-10-CM

## 2019-01-01 DIAGNOSIS — Z87.891 PERSONAL HISTORY OF NICOTINE DEPENDENCE: ICD-10-CM

## 2019-01-01 DIAGNOSIS — I48.0 PAROXYSMAL ATRIAL FIBRILLATION: ICD-10-CM

## 2019-01-01 DIAGNOSIS — I95.9 HYPOTENSION, UNSPECIFIED: ICD-10-CM

## 2019-01-01 DIAGNOSIS — L89.322 PRESSURE ULCER OF LEFT BUTTOCK, STAGE 2: ICD-10-CM

## 2019-01-01 DIAGNOSIS — R13.10 DYSPHAGIA, UNSPECIFIED: ICD-10-CM

## 2019-01-01 DIAGNOSIS — R65.21 SEVERE SEPSIS WITH SEPTIC SHOCK: ICD-10-CM

## 2019-01-01 DIAGNOSIS — I48.11 LONGSTANDING PERSISTENT ATRIAL FI: ICD-10-CM

## 2019-01-01 DIAGNOSIS — R41.82 ALTERED MENTAL STATUS, UNSPECIFIED: ICD-10-CM

## 2019-01-01 DIAGNOSIS — G93.41 METABOLIC ENCEPHALOPATHY: ICD-10-CM

## 2019-01-01 DIAGNOSIS — Z90.49 ACQUIRED ABSENCE OF OTHER SPECIFIED PARTS OF DIGESTIVE TRACT: ICD-10-CM

## 2019-01-01 DIAGNOSIS — N39.0 URINARY TRACT INFECTION, SITE NOT SPECIFIED: ICD-10-CM

## 2019-01-01 DIAGNOSIS — D69.6 THROMBOCYTOPENIA, UNSPECIFIED: ICD-10-CM

## 2019-01-01 DIAGNOSIS — D50.8 OTHER IRON DEFICIENCY ANEMIAS: ICD-10-CM

## 2019-01-01 DIAGNOSIS — I21.A1 MYOCARDIAL INFARCTION TYPE 2: ICD-10-CM

## 2019-01-01 DIAGNOSIS — L89.312 PRESSURE ULCER OF RIGHT BUTTOCK, STAGE 2: ICD-10-CM

## 2019-01-01 DIAGNOSIS — K51.00 ULCERATIVE (CHRONIC) PANCOLITIS WITHOUT COMPLICATIONS: ICD-10-CM

## 2019-01-01 DIAGNOSIS — I25.10 ATHEROSCLEROTIC HEART DISEASE OF NATIVE CORONARY ARTERY W/OUT ANGINA PECTORIS: ICD-10-CM

## 2019-01-01 DIAGNOSIS — F03.90 UNSPECIFIED DEMENTIA WITHOUT BEHAVIORAL DISTURBANCE: ICD-10-CM

## 2019-01-01 LAB
-  AMIKACIN: SIGNIFICANT CHANGE UP
-  AMPICILLIN/SULBACTAM: SIGNIFICANT CHANGE UP
-  AMPICILLIN: SIGNIFICANT CHANGE UP
-  AZTREONAM: SIGNIFICANT CHANGE UP
-  CANDIDA ALBICANS: SIGNIFICANT CHANGE UP
-  CANDIDA GLABRATA: SIGNIFICANT CHANGE UP
-  CANDIDA KRUSEI: SIGNIFICANT CHANGE UP
-  CANDIDA PARAPSILOSIS: SIGNIFICANT CHANGE UP
-  CANDIDA TROPICALIS: SIGNIFICANT CHANGE UP
-  CEFAZOLIN: SIGNIFICANT CHANGE UP
-  CEFEPIME: SIGNIFICANT CHANGE UP
-  CEFOXITIN: SIGNIFICANT CHANGE UP
-  CEFTRIAXONE: SIGNIFICANT CHANGE UP
-  CIPROFLOXACIN: SIGNIFICANT CHANGE UP
-  COAGULASE NEGATIVE STAPHYLOCOCCUS: SIGNIFICANT CHANGE UP
-  ERTAPENEM: SIGNIFICANT CHANGE UP
-  GENTAMICIN: SIGNIFICANT CHANGE UP
-  IMIPENEM: SIGNIFICANT CHANGE UP
-  K. PNEUMONIAE GROUP: SIGNIFICANT CHANGE UP
-  KPC RESISTANCE GENE: SIGNIFICANT CHANGE UP
-  LEVOFLOXACIN: SIGNIFICANT CHANGE UP
-  MEROPENEM: SIGNIFICANT CHANGE UP
-  NITROFURANTOIN: SIGNIFICANT CHANGE UP
-  NITROFURANTOIN: SIGNIFICANT CHANGE UP
-  PIPERACILLIN/TAZOBACTAM: SIGNIFICANT CHANGE UP
-  STREPTOCOCCUS SP. (NOT GRP A, B OR S PNEUMONIAE): SIGNIFICANT CHANGE UP
-  TIGECYCLINE: SIGNIFICANT CHANGE UP
-  TOBRAMYCIN: SIGNIFICANT CHANGE UP
-  TRIMETHOPRIM/SULFAMETHOXAZOLE: SIGNIFICANT CHANGE UP
A BAUMANNII DNA SPEC QL NAA+PROBE: SIGNIFICANT CHANGE UP
ALBUMIN SERPL ELPH-MCNC: 1.7 G/DL — LOW (ref 3.5–5.2)
ALBUMIN SERPL ELPH-MCNC: 1.7 G/DL — LOW (ref 3.5–5.2)
ALBUMIN SERPL ELPH-MCNC: 1.9 G/DL — LOW (ref 3.5–5.2)
ALBUMIN SERPL ELPH-MCNC: 2 G/DL — LOW (ref 3.5–5.2)
ALBUMIN SERPL ELPH-MCNC: 2 G/DL — LOW (ref 3.5–5.2)
ALBUMIN SERPL ELPH-MCNC: 2.1 G/DL — LOW (ref 3.5–5.2)
ALBUMIN SERPL ELPH-MCNC: 2.2 G/DL — LOW (ref 3.5–5.2)
ALBUMIN SERPL ELPH-MCNC: 2.3 G/DL — LOW (ref 3.5–5.2)
ALBUMIN SERPL ELPH-MCNC: 2.3 G/DL — LOW (ref 3.5–5.2)
ALBUMIN SERPL ELPH-MCNC: 2.4 G/DL — LOW (ref 3.5–5.2)
ALBUMIN SERPL ELPH-MCNC: 2.4 G/DL — LOW (ref 3.5–5.2)
ALBUMIN SERPL ELPH-MCNC: 2.9 G/DL — LOW (ref 3.5–5.2)
ALBUMIN SERPL ELPH-MCNC: 2.9 G/DL — LOW (ref 3.5–5.2)
ALBUMIN SERPL ELPH-MCNC: 3 G/DL — LOW (ref 3.5–5.2)
ALBUMIN SERPL ELPH-MCNC: 3.1 G/DL — LOW (ref 3.5–5.2)
ALBUMIN SERPL ELPH-MCNC: 3.4 G/DL — LOW (ref 3.5–5.2)
ALP SERPL-CCNC: 142 U/L — HIGH (ref 30–115)
ALP SERPL-CCNC: 49 U/L — SIGNIFICANT CHANGE UP (ref 30–115)
ALP SERPL-CCNC: 50 U/L — SIGNIFICANT CHANGE UP (ref 30–115)
ALP SERPL-CCNC: 50 U/L — SIGNIFICANT CHANGE UP (ref 30–115)
ALP SERPL-CCNC: 51 U/L — SIGNIFICANT CHANGE UP (ref 30–115)
ALP SERPL-CCNC: 54 U/L — SIGNIFICANT CHANGE UP (ref 30–115)
ALP SERPL-CCNC: 61 U/L — SIGNIFICANT CHANGE UP (ref 30–115)
ALP SERPL-CCNC: 65 U/L — SIGNIFICANT CHANGE UP (ref 30–115)
ALP SERPL-CCNC: 72 U/L — SIGNIFICANT CHANGE UP (ref 30–115)
ALP SERPL-CCNC: 73 U/L — SIGNIFICANT CHANGE UP (ref 30–115)
ALP SERPL-CCNC: 74 U/L — SIGNIFICANT CHANGE UP (ref 30–115)
ALP SERPL-CCNC: 79 U/L — SIGNIFICANT CHANGE UP (ref 30–115)
ALP SERPL-CCNC: 82 U/L — SIGNIFICANT CHANGE UP (ref 30–115)
ALP SERPL-CCNC: 83 U/L — SIGNIFICANT CHANGE UP (ref 30–115)
ALP SERPL-CCNC: 86 U/L — SIGNIFICANT CHANGE UP (ref 30–115)
ALP SERPL-CCNC: 88 U/L — SIGNIFICANT CHANGE UP (ref 30–115)
ALP SERPL-CCNC: 88 U/L — SIGNIFICANT CHANGE UP (ref 30–115)
ALP SERPL-CCNC: 90 U/L — SIGNIFICANT CHANGE UP (ref 30–115)
ALP SERPL-CCNC: 96 U/L — SIGNIFICANT CHANGE UP (ref 30–115)
ALT FLD-CCNC: 10 U/L — SIGNIFICANT CHANGE UP (ref 0–41)
ALT FLD-CCNC: 11 U/L — SIGNIFICANT CHANGE UP (ref 0–41)
ALT FLD-CCNC: 116 U/L — HIGH (ref 0–41)
ALT FLD-CCNC: 15 U/L — SIGNIFICANT CHANGE UP (ref 0–41)
ALT FLD-CCNC: 164 U/L — HIGH (ref 0–41)
ALT FLD-CCNC: 5 U/L — SIGNIFICANT CHANGE UP (ref 0–41)
ALT FLD-CCNC: 59 U/L — HIGH (ref 0–41)
ALT FLD-CCNC: 6 U/L — SIGNIFICANT CHANGE UP (ref 0–41)
ALT FLD-CCNC: 7 U/L — SIGNIFICANT CHANGE UP (ref 0–41)
ALT FLD-CCNC: 89 U/L — HIGH (ref 0–41)
ALT FLD-CCNC: 9 U/L — SIGNIFICANT CHANGE UP (ref 0–41)
ANION GAP SERPL CALC-SCNC: 11 MMOL/L — SIGNIFICANT CHANGE UP (ref 7–14)
ANION GAP SERPL CALC-SCNC: 12 MMOL/L — SIGNIFICANT CHANGE UP (ref 7–14)
ANION GAP SERPL CALC-SCNC: 13 MMOL/L — SIGNIFICANT CHANGE UP (ref 7–14)
ANION GAP SERPL CALC-SCNC: 13 MMOL/L — SIGNIFICANT CHANGE UP (ref 7–14)
ANION GAP SERPL CALC-SCNC: 14 MMOL/L — SIGNIFICANT CHANGE UP (ref 7–14)
ANION GAP SERPL CALC-SCNC: 18 MMOL/L — HIGH (ref 7–14)
ANION GAP SERPL CALC-SCNC: 19 MMOL/L — HIGH (ref 7–14)
ANION GAP SERPL CALC-SCNC: 22 MMOL/L — HIGH (ref 7–14)
ANION GAP SERPL CALC-SCNC: 23 MMOL/L — HIGH (ref 7–14)
ANION GAP SERPL CALC-SCNC: 28 MMOL/L — HIGH (ref 7–14)
ANION GAP SERPL CALC-SCNC: 8 MMOL/L — SIGNIFICANT CHANGE UP (ref 7–14)
ANION GAP SERPL CALC-SCNC: 9 MMOL/L — SIGNIFICANT CHANGE UP (ref 7–14)
APPEARANCE UR: ABNORMAL
APPEARANCE UR: ABNORMAL
APTT BLD: 35.5 SEC — SIGNIFICANT CHANGE UP (ref 27–39.2)
APTT BLD: 35.7 SEC — SIGNIFICANT CHANGE UP (ref 27–39.2)
AST SERPL-CCNC: 101 U/L — HIGH (ref 0–41)
AST SERPL-CCNC: 13 U/L — SIGNIFICANT CHANGE UP (ref 0–41)
AST SERPL-CCNC: 14 U/L — SIGNIFICANT CHANGE UP (ref 0–41)
AST SERPL-CCNC: 15 U/L — SIGNIFICANT CHANGE UP (ref 0–41)
AST SERPL-CCNC: 16 U/L — SIGNIFICANT CHANGE UP (ref 0–41)
AST SERPL-CCNC: 17 U/L — SIGNIFICANT CHANGE UP (ref 0–41)
AST SERPL-CCNC: 17 U/L — SIGNIFICANT CHANGE UP (ref 0–41)
AST SERPL-CCNC: 18 U/L — SIGNIFICANT CHANGE UP (ref 0–41)
AST SERPL-CCNC: 181 U/L — HIGH (ref 0–41)
AST SERPL-CCNC: 20 U/L — SIGNIFICANT CHANGE UP (ref 0–41)
AST SERPL-CCNC: 21 U/L — SIGNIFICANT CHANGE UP (ref 0–41)
AST SERPL-CCNC: 21 U/L — SIGNIFICANT CHANGE UP (ref 0–41)
AST SERPL-CCNC: 23 U/L — SIGNIFICANT CHANGE UP (ref 0–41)
AST SERPL-CCNC: 24 U/L — SIGNIFICANT CHANGE UP (ref 0–41)
AST SERPL-CCNC: 24 U/L — SIGNIFICANT CHANGE UP (ref 0–41)
AST SERPL-CCNC: 39 U/L — SIGNIFICANT CHANGE UP (ref 0–41)
AST SERPL-CCNC: 70 U/L — HIGH (ref 0–41)
BACTERIA # UR AUTO: ABNORMAL
BACTERIA # UR AUTO: NEGATIVE — SIGNIFICANT CHANGE UP
BASE EXCESS BLDV CALC-SCNC: -0.1 MMOL/L — SIGNIFICANT CHANGE UP (ref -2–2)
BASE EXCESS BLDV CALC-SCNC: -1.8 MMOL/L — SIGNIFICANT CHANGE UP (ref -2–2)
BASE EXCESS BLDV CALC-SCNC: 0.1 MMOL/L — SIGNIFICANT CHANGE UP (ref -2–2)
BASOPHILS # BLD AUTO: 0 K/UL — SIGNIFICANT CHANGE UP (ref 0–0.2)
BASOPHILS # BLD AUTO: 0.01 K/UL — SIGNIFICANT CHANGE UP (ref 0–0.2)
BASOPHILS # BLD AUTO: 0.02 K/UL — SIGNIFICANT CHANGE UP (ref 0–0.2)
BASOPHILS # BLD AUTO: 0.03 K/UL — SIGNIFICANT CHANGE UP (ref 0–0.2)
BASOPHILS # BLD AUTO: 0.03 K/UL — SIGNIFICANT CHANGE UP (ref 0–0.2)
BASOPHILS # BLD AUTO: 0.04 K/UL — SIGNIFICANT CHANGE UP (ref 0–0.2)
BASOPHILS # BLD AUTO: 0.05 K/UL — SIGNIFICANT CHANGE UP (ref 0–0.2)
BASOPHILS # BLD AUTO: 0.06 K/UL — SIGNIFICANT CHANGE UP (ref 0–0.2)
BASOPHILS # BLD AUTO: 0.07 K/UL — SIGNIFICANT CHANGE UP (ref 0–0.2)
BASOPHILS # BLD AUTO: 0.09 K/UL — SIGNIFICANT CHANGE UP (ref 0–0.2)
BASOPHILS # BLD AUTO: 0.12 K/UL — SIGNIFICANT CHANGE UP (ref 0–0.2)
BASOPHILS NFR BLD AUTO: 0 % — SIGNIFICANT CHANGE UP (ref 0–1)
BASOPHILS NFR BLD AUTO: 0.1 % — SIGNIFICANT CHANGE UP (ref 0–1)
BASOPHILS NFR BLD AUTO: 0.2 % — SIGNIFICANT CHANGE UP (ref 0–1)
BASOPHILS NFR BLD AUTO: 0.3 % — SIGNIFICANT CHANGE UP (ref 0–1)
BASOPHILS NFR BLD AUTO: 0.4 % — SIGNIFICANT CHANGE UP (ref 0–1)
BASOPHILS NFR BLD AUTO: 0.5 % — SIGNIFICANT CHANGE UP (ref 0–1)
BASOPHILS NFR BLD AUTO: 0.6 % — SIGNIFICANT CHANGE UP (ref 0–1)
BASOPHILS NFR BLD AUTO: 0.7 % — SIGNIFICANT CHANGE UP (ref 0–1)
BASOPHILS NFR BLD AUTO: 0.9 % — SIGNIFICANT CHANGE UP (ref 0–1)
BASOPHILS NFR BLD AUTO: 0.9 % — SIGNIFICANT CHANGE UP (ref 0–1)
BASOPHILS NFR BLD AUTO: 1.1 % — HIGH (ref 0–1)
BASOPHILS NFR BLD AUTO: 1.1 % — HIGH (ref 0–1)
BILIRUB DIRECT SERPL-MCNC: 0.2 MG/DL — SIGNIFICANT CHANGE UP (ref 0–0.2)
BILIRUB INDIRECT FLD-MCNC: 0.4 MG/DL — SIGNIFICANT CHANGE UP (ref 0.2–1.2)
BILIRUB SERPL-MCNC: 0.2 MG/DL — SIGNIFICANT CHANGE UP (ref 0.2–1.2)
BILIRUB SERPL-MCNC: 0.3 MG/DL — SIGNIFICANT CHANGE UP (ref 0.2–1.2)
BILIRUB SERPL-MCNC: 0.4 MG/DL — SIGNIFICANT CHANGE UP (ref 0.2–1.2)
BILIRUB SERPL-MCNC: 0.5 MG/DL — SIGNIFICANT CHANGE UP (ref 0.2–1.2)
BILIRUB SERPL-MCNC: 0.6 MG/DL — SIGNIFICANT CHANGE UP (ref 0.2–1.2)
BILIRUB UR-MCNC: ABNORMAL
BILIRUB UR-MCNC: NEGATIVE — SIGNIFICANT CHANGE UP
BLD GP AB SCN SERPL QL: SIGNIFICANT CHANGE UP
BLD GP AB SCN SERPL QL: SIGNIFICANT CHANGE UP
BUN SERPL-MCNC: 11 MG/DL — SIGNIFICANT CHANGE UP (ref 10–20)
BUN SERPL-MCNC: 12 MG/DL — SIGNIFICANT CHANGE UP (ref 10–20)
BUN SERPL-MCNC: 120 MG/DL — CRITICAL HIGH (ref 10–20)
BUN SERPL-MCNC: 13 MG/DL — SIGNIFICANT CHANGE UP (ref 10–20)
BUN SERPL-MCNC: 15 MG/DL — SIGNIFICANT CHANGE UP (ref 10–20)
BUN SERPL-MCNC: 16 MG/DL — SIGNIFICANT CHANGE UP (ref 10–20)
BUN SERPL-MCNC: 16 MG/DL — SIGNIFICANT CHANGE UP (ref 10–20)
BUN SERPL-MCNC: 17 MG/DL — SIGNIFICANT CHANGE UP (ref 10–20)
BUN SERPL-MCNC: 17 MG/DL — SIGNIFICANT CHANGE UP (ref 10–20)
BUN SERPL-MCNC: 19 MG/DL — SIGNIFICANT CHANGE UP (ref 10–20)
BUN SERPL-MCNC: 20 MG/DL — SIGNIFICANT CHANGE UP (ref 10–20)
BUN SERPL-MCNC: 22 MG/DL — HIGH (ref 10–20)
BUN SERPL-MCNC: 25 MG/DL — HIGH (ref 10–20)
BUN SERPL-MCNC: 25 MG/DL — HIGH (ref 10–20)
BUN SERPL-MCNC: 34 MG/DL — HIGH (ref 10–20)
BUN SERPL-MCNC: 35 MG/DL — HIGH (ref 10–20)
BUN SERPL-MCNC: 39 MG/DL — HIGH (ref 10–20)
BUN SERPL-MCNC: 41 MG/DL — HIGH (ref 10–20)
BUN SERPL-MCNC: 42 MG/DL — HIGH (ref 10–20)
BUN SERPL-MCNC: 51 MG/DL — HIGH (ref 10–20)
BUN SERPL-MCNC: 66 MG/DL — CRITICAL HIGH (ref 10–20)
BUN SERPL-MCNC: 7 MG/DL — LOW (ref 10–20)
BUN SERPL-MCNC: 8 MG/DL — LOW (ref 10–20)
BUN SERPL-MCNC: 8 MG/DL — LOW (ref 10–20)
BUN SERPL-MCNC: 81 MG/DL — CRITICAL HIGH (ref 10–20)
BUN SERPL-MCNC: 91 MG/DL — CRITICAL HIGH (ref 10–20)
BUN SERPL-MCNC: 93 MG/DL — CRITICAL HIGH (ref 10–20)
BUN SERPL-MCNC: 98 MG/DL — CRITICAL HIGH (ref 10–20)
BURR CELLS BLD QL SMEAR: SIGNIFICANT CHANGE UP
C DIFF BY PCR RESULT: NEGATIVE — SIGNIFICANT CHANGE UP
C DIFF BY PCR RESULT: POSITIVE
C DIFF TOX GENS STL QL NAA+PROBE: SIGNIFICANT CHANGE UP
C DIFF TOX GENS STL QL NAA+PROBE: SIGNIFICANT CHANGE UP
CA-I SERPL-SCNC: 1.08 MMOL/L — LOW (ref 1.12–1.3)
CA-I SERPL-SCNC: 1.16 MMOL/L — SIGNIFICANT CHANGE UP (ref 1.12–1.3)
CA-I SERPL-SCNC: 1.2 MMOL/L — SIGNIFICANT CHANGE UP (ref 1.12–1.3)
CALCIUM SERPL-MCNC: 7 MG/DL — LOW (ref 8.5–10.1)
CALCIUM SERPL-MCNC: 7.2 MG/DL — LOW (ref 8.5–10.1)
CALCIUM SERPL-MCNC: 7.4 MG/DL — LOW (ref 8.5–10.1)
CALCIUM SERPL-MCNC: 7.5 MG/DL — LOW (ref 8.5–10.1)
CALCIUM SERPL-MCNC: 7.6 MG/DL — LOW (ref 8.5–10.1)
CALCIUM SERPL-MCNC: 7.7 MG/DL — LOW (ref 8.5–10.1)
CALCIUM SERPL-MCNC: 7.8 MG/DL — LOW (ref 8.5–10.1)
CALCIUM SERPL-MCNC: 7.9 MG/DL — LOW (ref 8.5–10.1)
CALCIUM SERPL-MCNC: 8 MG/DL — LOW (ref 8.5–10.1)
CALCIUM SERPL-MCNC: 8 MG/DL — LOW (ref 8.5–10.1)
CALCIUM SERPL-MCNC: 8.1 MG/DL — LOW (ref 8.5–10.1)
CALCIUM SERPL-MCNC: 8.3 MG/DL — LOW (ref 8.5–10.1)
CALCIUM SERPL-MCNC: 8.4 MG/DL — LOW (ref 8.5–10.1)
CALCIUM SERPL-MCNC: 8.4 MG/DL — LOW (ref 8.5–10.1)
CALCIUM SERPL-MCNC: 8.5 MG/DL — SIGNIFICANT CHANGE UP (ref 8.5–10.1)
CALCIUM SERPL-MCNC: 8.7 MG/DL — SIGNIFICANT CHANGE UP (ref 8.5–10.1)
CHLORIDE SERPL-SCNC: 100 MMOL/L — SIGNIFICANT CHANGE UP (ref 98–110)
CHLORIDE SERPL-SCNC: 102 MMOL/L — SIGNIFICANT CHANGE UP (ref 98–110)
CHLORIDE SERPL-SCNC: 104 MMOL/L — SIGNIFICANT CHANGE UP (ref 98–110)
CHLORIDE SERPL-SCNC: 104 MMOL/L — SIGNIFICANT CHANGE UP (ref 98–110)
CHLORIDE SERPL-SCNC: 105 MMOL/L — SIGNIFICANT CHANGE UP (ref 98–110)
CHLORIDE SERPL-SCNC: 106 MMOL/L — SIGNIFICANT CHANGE UP (ref 98–110)
CHLORIDE SERPL-SCNC: 107 MMOL/L — SIGNIFICANT CHANGE UP (ref 98–110)
CHLORIDE SERPL-SCNC: 108 MMOL/L — SIGNIFICANT CHANGE UP (ref 98–110)
CHLORIDE SERPL-SCNC: 108 MMOL/L — SIGNIFICANT CHANGE UP (ref 98–110)
CHLORIDE SERPL-SCNC: 109 MMOL/L — SIGNIFICANT CHANGE UP (ref 98–110)
CHLORIDE SERPL-SCNC: 110 MMOL/L — SIGNIFICANT CHANGE UP (ref 98–110)
CHLORIDE SERPL-SCNC: 110 MMOL/L — SIGNIFICANT CHANGE UP (ref 98–110)
CHLORIDE SERPL-SCNC: 112 MMOL/L — HIGH (ref 98–110)
CHLORIDE SERPL-SCNC: 113 MMOL/L — HIGH (ref 98–110)
CHLORIDE SERPL-SCNC: 89 MMOL/L — LOW (ref 98–110)
CHLORIDE SERPL-SCNC: 90 MMOL/L — LOW (ref 98–110)
CHLORIDE SERPL-SCNC: 91 MMOL/L — LOW (ref 98–110)
CHLORIDE SERPL-SCNC: 91 MMOL/L — LOW (ref 98–110)
CHLORIDE SERPL-SCNC: 96 MMOL/L — LOW (ref 98–110)
CHLORIDE SERPL-SCNC: 99 MMOL/L — SIGNIFICANT CHANGE UP (ref 98–110)
CHLORIDE SERPL-SCNC: 99 MMOL/L — SIGNIFICANT CHANGE UP (ref 98–110)
CK MB CFR SERPL CALC: 7 NG/ML — HIGH (ref 0.6–6.3)
CK SERPL-CCNC: 240 U/L — HIGH (ref 0–225)
CO2 SERPL-SCNC: 15 MMOL/L — LOW (ref 17–32)
CO2 SERPL-SCNC: 19 MMOL/L — SIGNIFICANT CHANGE UP (ref 17–32)
CO2 SERPL-SCNC: 20 MMOL/L — SIGNIFICANT CHANGE UP (ref 17–32)
CO2 SERPL-SCNC: 21 MMOL/L — SIGNIFICANT CHANGE UP (ref 17–32)
CO2 SERPL-SCNC: 22 MMOL/L — SIGNIFICANT CHANGE UP (ref 17–32)
CO2 SERPL-SCNC: 23 MMOL/L — SIGNIFICANT CHANGE UP (ref 17–32)
CO2 SERPL-SCNC: 24 MMOL/L — SIGNIFICANT CHANGE UP (ref 17–32)
CO2 SERPL-SCNC: 25 MMOL/L — SIGNIFICANT CHANGE UP (ref 17–32)
CO2 SERPL-SCNC: 26 MMOL/L — SIGNIFICANT CHANGE UP (ref 17–32)
CO2 SERPL-SCNC: 27 MMOL/L — SIGNIFICANT CHANGE UP (ref 17–32)
CO2 SERPL-SCNC: 28 MMOL/L — SIGNIFICANT CHANGE UP (ref 17–32)
CO2 SERPL-SCNC: 29 MMOL/L — SIGNIFICANT CHANGE UP (ref 17–32)
CO2 SERPL-SCNC: 29 MMOL/L — SIGNIFICANT CHANGE UP (ref 17–32)
CO2 SERPL-SCNC: 30 MMOL/L — SIGNIFICANT CHANGE UP (ref 17–32)
CO2 SERPL-SCNC: 33 MMOL/L — HIGH (ref 17–32)
COLOR SPEC: ABNORMAL
COLOR SPEC: YELLOW — SIGNIFICANT CHANGE UP
CREAT SERPL-MCNC: 0.6 MG/DL — LOW (ref 0.7–1.5)
CREAT SERPL-MCNC: 0.7 MG/DL — SIGNIFICANT CHANGE UP (ref 0.7–1.5)
CREAT SERPL-MCNC: 0.8 MG/DL — SIGNIFICANT CHANGE UP (ref 0.7–1.5)
CREAT SERPL-MCNC: 0.9 MG/DL — SIGNIFICANT CHANGE UP (ref 0.7–1.5)
CREAT SERPL-MCNC: 1 MG/DL — SIGNIFICANT CHANGE UP (ref 0.7–1.5)
CREAT SERPL-MCNC: 1 MG/DL — SIGNIFICANT CHANGE UP (ref 0.7–1.5)
CREAT SERPL-MCNC: 1.3 MG/DL — SIGNIFICANT CHANGE UP (ref 0.7–1.5)
CREAT SERPL-MCNC: 1.4 MG/DL — SIGNIFICANT CHANGE UP (ref 0.7–1.5)
CREAT SERPL-MCNC: 1.6 MG/DL — HIGH (ref 0.7–1.5)
CREAT SERPL-MCNC: 1.8 MG/DL — HIGH (ref 0.7–1.5)
CREAT SERPL-MCNC: 2.3 MG/DL — HIGH (ref 0.7–1.5)
CREAT SERPL-MCNC: 2.8 MG/DL — HIGH (ref 0.7–1.5)
CREAT SERPL-MCNC: 4.4 MG/DL — CRITICAL HIGH (ref 0.7–1.5)
CREAT SERPL-MCNC: 5.6 MG/DL — CRITICAL HIGH (ref 0.7–1.5)
CREAT SERPL-MCNC: 5.8 MG/DL — CRITICAL HIGH (ref 0.7–1.5)
CREAT SERPL-MCNC: 7.7 MG/DL — CRITICAL HIGH (ref 0.7–1.5)
CULTURE RESULTS: NO GROWTH — SIGNIFICANT CHANGE UP
CULTURE RESULTS: SIGNIFICANT CHANGE UP
DIFF PNL FLD: ABNORMAL
DIFF PNL FLD: NEGATIVE — SIGNIFICANT CHANGE UP
DIGOXIN SERPL-MCNC: <0.3 NG/ML — LOW (ref 0.8–2)
E CLOAC COMP DNA BLD POS QL NAA+PROBE: SIGNIFICANT CHANGE UP
E COLI DNA BLD POS QL NAA+NON-PROBE: SIGNIFICANT CHANGE UP
ENTEROCOC DNA BLD POS QL NAA+NON-PROBE: SIGNIFICANT CHANGE UP
ENTEROCOC DNA BLD POS QL NAA+NON-PROBE: SIGNIFICANT CHANGE UP
EOSINOPHIL # BLD AUTO: 0 K/UL — SIGNIFICANT CHANGE UP (ref 0–0.7)
EOSINOPHIL # BLD AUTO: 0.01 K/UL — SIGNIFICANT CHANGE UP (ref 0–0.7)
EOSINOPHIL # BLD AUTO: 0.02 K/UL — SIGNIFICANT CHANGE UP (ref 0–0.7)
EOSINOPHIL # BLD AUTO: 0.03 K/UL — SIGNIFICANT CHANGE UP (ref 0–0.7)
EOSINOPHIL # BLD AUTO: 0.04 K/UL — SIGNIFICANT CHANGE UP (ref 0–0.7)
EOSINOPHIL # BLD AUTO: 0.05 K/UL — SIGNIFICANT CHANGE UP (ref 0–0.7)
EOSINOPHIL # BLD AUTO: 0.06 K/UL — SIGNIFICANT CHANGE UP (ref 0–0.7)
EOSINOPHIL # BLD AUTO: 0.07 K/UL — SIGNIFICANT CHANGE UP (ref 0–0.7)
EOSINOPHIL # BLD AUTO: 0.07 K/UL — SIGNIFICANT CHANGE UP (ref 0–0.7)
EOSINOPHIL # BLD AUTO: 0.08 K/UL — SIGNIFICANT CHANGE UP (ref 0–0.7)
EOSINOPHIL # BLD AUTO: 0.08 K/UL — SIGNIFICANT CHANGE UP (ref 0–0.7)
EOSINOPHIL # BLD AUTO: 0.14 K/UL — SIGNIFICANT CHANGE UP (ref 0–0.7)
EOSINOPHIL # BLD AUTO: 0.24 K/UL — SIGNIFICANT CHANGE UP (ref 0–0.7)
EOSINOPHIL # BLD AUTO: 0.25 K/UL — SIGNIFICANT CHANGE UP (ref 0–0.7)
EOSINOPHIL # BLD AUTO: 0.25 K/UL — SIGNIFICANT CHANGE UP (ref 0–0.7)
EOSINOPHIL NFR BLD AUTO: 0 % — SIGNIFICANT CHANGE UP (ref 0–8)
EOSINOPHIL NFR BLD AUTO: 0.1 % — SIGNIFICANT CHANGE UP (ref 0–8)
EOSINOPHIL NFR BLD AUTO: 0.2 % — SIGNIFICANT CHANGE UP (ref 0–8)
EOSINOPHIL NFR BLD AUTO: 0.3 % — SIGNIFICANT CHANGE UP (ref 0–8)
EOSINOPHIL NFR BLD AUTO: 0.3 % — SIGNIFICANT CHANGE UP (ref 0–8)
EOSINOPHIL NFR BLD AUTO: 0.5 % — SIGNIFICANT CHANGE UP (ref 0–8)
EOSINOPHIL NFR BLD AUTO: 0.5 % — SIGNIFICANT CHANGE UP (ref 0–8)
EOSINOPHIL NFR BLD AUTO: 0.6 % — SIGNIFICANT CHANGE UP (ref 0–8)
EOSINOPHIL NFR BLD AUTO: 0.7 % — SIGNIFICANT CHANGE UP (ref 0–8)
EOSINOPHIL NFR BLD AUTO: 0.8 % — SIGNIFICANT CHANGE UP (ref 0–8)
EOSINOPHIL NFR BLD AUTO: 0.9 % — SIGNIFICANT CHANGE UP (ref 0–8)
EOSINOPHIL NFR BLD AUTO: 1 % — SIGNIFICANT CHANGE UP (ref 0–8)
EOSINOPHIL NFR BLD AUTO: 1 % — SIGNIFICANT CHANGE UP (ref 0–8)
EOSINOPHIL NFR BLD AUTO: 1.2 % — SIGNIFICANT CHANGE UP (ref 0–8)
EOSINOPHIL NFR BLD AUTO: 1.3 % — SIGNIFICANT CHANGE UP (ref 0–8)
EOSINOPHIL NFR BLD AUTO: 1.3 % — SIGNIFICANT CHANGE UP (ref 0–8)
EOSINOPHIL NFR BLD AUTO: 1.6 % — SIGNIFICANT CHANGE UP (ref 0–8)
EOSINOPHIL NFR BLD AUTO: 2.8 % — SIGNIFICANT CHANGE UP (ref 0–8)
EOSINOPHIL NFR BLD AUTO: 2.9 % — SIGNIFICANT CHANGE UP (ref 0–8)
EOSINOPHIL NFR BLD AUTO: 3 % — SIGNIFICANT CHANGE UP (ref 0–8)
EPI CELLS # UR: 0 /HPF — SIGNIFICANT CHANGE UP (ref 0–5)
EPI CELLS # UR: 11 /HPF — HIGH (ref 0–5)
GAS PNL BLDV: 130 MMOL/L — LOW (ref 136–145)
GAS PNL BLDV: 140 MMOL/L — SIGNIFICANT CHANGE UP (ref 136–145)
GAS PNL BLDV: 142 MMOL/L — SIGNIFICANT CHANGE UP (ref 136–145)
GAS PNL BLDV: SIGNIFICANT CHANGE UP
GIANT PLATELETS BLD QL SMEAR: PRESENT — SIGNIFICANT CHANGE UP
GLUCOSE BLDC GLUCOMTR-MCNC: 120 MG/DL — HIGH (ref 70–99)
GLUCOSE SERPL-MCNC: 101 MG/DL — HIGH (ref 70–99)
GLUCOSE SERPL-MCNC: 105 MG/DL — HIGH (ref 70–99)
GLUCOSE SERPL-MCNC: 107 MG/DL — HIGH (ref 70–99)
GLUCOSE SERPL-MCNC: 108 MG/DL — HIGH (ref 70–99)
GLUCOSE SERPL-MCNC: 109 MG/DL — HIGH (ref 70–99)
GLUCOSE SERPL-MCNC: 110 MG/DL — HIGH (ref 70–99)
GLUCOSE SERPL-MCNC: 111 MG/DL — HIGH (ref 70–99)
GLUCOSE SERPL-MCNC: 114 MG/DL — HIGH (ref 70–99)
GLUCOSE SERPL-MCNC: 116 MG/DL — HIGH (ref 70–99)
GLUCOSE SERPL-MCNC: 117 MG/DL — HIGH (ref 70–99)
GLUCOSE SERPL-MCNC: 118 MG/DL — HIGH (ref 70–99)
GLUCOSE SERPL-MCNC: 119 MG/DL — HIGH (ref 70–99)
GLUCOSE SERPL-MCNC: 119 MG/DL — HIGH (ref 70–99)
GLUCOSE SERPL-MCNC: 136 MG/DL — HIGH (ref 70–99)
GLUCOSE SERPL-MCNC: 143 MG/DL — HIGH (ref 70–99)
GLUCOSE SERPL-MCNC: 150 MG/DL — HIGH (ref 70–99)
GLUCOSE SERPL-MCNC: 205 MG/DL — HIGH (ref 70–99)
GLUCOSE SERPL-MCNC: 223 MG/DL — HIGH (ref 70–99)
GLUCOSE SERPL-MCNC: 74 MG/DL — SIGNIFICANT CHANGE UP (ref 70–99)
GLUCOSE SERPL-MCNC: 86 MG/DL — SIGNIFICANT CHANGE UP (ref 70–99)
GLUCOSE SERPL-MCNC: 90 MG/DL — SIGNIFICANT CHANGE UP (ref 70–99)
GLUCOSE SERPL-MCNC: 92 MG/DL — SIGNIFICANT CHANGE UP (ref 70–99)
GLUCOSE SERPL-MCNC: 92 MG/DL — SIGNIFICANT CHANGE UP (ref 70–99)
GLUCOSE SERPL-MCNC: 93 MG/DL — SIGNIFICANT CHANGE UP (ref 70–99)
GLUCOSE SERPL-MCNC: 93 MG/DL — SIGNIFICANT CHANGE UP (ref 70–99)
GLUCOSE SERPL-MCNC: 94 MG/DL — SIGNIFICANT CHANGE UP (ref 70–99)
GLUCOSE SERPL-MCNC: 97 MG/DL — SIGNIFICANT CHANGE UP (ref 70–99)
GLUCOSE SERPL-MCNC: 99 MG/DL — SIGNIFICANT CHANGE UP (ref 70–99)
GLUCOSE UR QL: NEGATIVE — SIGNIFICANT CHANGE UP
GLUCOSE UR QL: SIGNIFICANT CHANGE UP
GP B STREP DNA BLD POS QL NAA+NON-PROBE: SIGNIFICANT CHANGE UP
GRAM STN FLD: SIGNIFICANT CHANGE UP
HAEM INFLU DNA BLD POS QL NAA+NON-PROBE: SIGNIFICANT CHANGE UP
HCO3 BLDV-SCNC: 26 MMOL/L — SIGNIFICANT CHANGE UP (ref 22–29)
HCO3 BLDV-SCNC: 27 MMOL/L — SIGNIFICANT CHANGE UP (ref 22–29)
HCO3 BLDV-SCNC: 27 MMOL/L — SIGNIFICANT CHANGE UP (ref 22–29)
HCT VFR BLD CALC: 33.3 % — LOW (ref 37–47)
HCT VFR BLD CALC: 33.3 % — LOW (ref 37–47)
HCT VFR BLD CALC: 34.2 % — LOW (ref 37–47)
HCT VFR BLD CALC: 34.3 % — LOW (ref 37–47)
HCT VFR BLD CALC: 34.4 % — LOW (ref 37–47)
HCT VFR BLD CALC: 34.8 % — LOW (ref 37–47)
HCT VFR BLD CALC: 36.1 % — LOW (ref 37–47)
HCT VFR BLD CALC: 36.7 % — LOW (ref 37–47)
HCT VFR BLD CALC: 37 % — SIGNIFICANT CHANGE UP (ref 37–47)
HCT VFR BLD CALC: 37 % — SIGNIFICANT CHANGE UP (ref 37–47)
HCT VFR BLD CALC: 37.1 % — SIGNIFICANT CHANGE UP (ref 37–47)
HCT VFR BLD CALC: 37.5 % — SIGNIFICANT CHANGE UP (ref 37–47)
HCT VFR BLD CALC: 37.7 % — SIGNIFICANT CHANGE UP (ref 37–47)
HCT VFR BLD CALC: 37.7 % — SIGNIFICANT CHANGE UP (ref 37–47)
HCT VFR BLD CALC: 38.1 % — SIGNIFICANT CHANGE UP (ref 37–47)
HCT VFR BLD CALC: 38.3 % — SIGNIFICANT CHANGE UP (ref 37–47)
HCT VFR BLD CALC: 39.1 % — SIGNIFICANT CHANGE UP (ref 37–47)
HCT VFR BLD CALC: 39.3 % — SIGNIFICANT CHANGE UP (ref 37–47)
HCT VFR BLD CALC: 40.3 % — SIGNIFICANT CHANGE UP (ref 37–47)
HCT VFR BLD CALC: 40.3 % — SIGNIFICANT CHANGE UP (ref 37–47)
HCT VFR BLD CALC: 40.4 % — SIGNIFICANT CHANGE UP (ref 37–47)
HCT VFR BLD CALC: 40.6 % — SIGNIFICANT CHANGE UP (ref 37–47)
HCT VFR BLD CALC: 41.1 % — SIGNIFICANT CHANGE UP (ref 37–47)
HCT VFR BLD CALC: 42 % — SIGNIFICANT CHANGE UP (ref 37–47)
HCT VFR BLD CALC: 44.3 % — SIGNIFICANT CHANGE UP (ref 37–47)
HCT VFR BLDA CALC: 35.4 % — SIGNIFICANT CHANGE UP (ref 34–44)
HCT VFR BLDA CALC: 44 % — SIGNIFICANT CHANGE UP (ref 34–44)
HCT VFR BLDA CALC: 55 % — HIGH (ref 34–44)
HGB BLD CALC-MCNC: 11.6 G/DL — LOW (ref 14–18)
HGB BLD CALC-MCNC: 14.3 G/DL — SIGNIFICANT CHANGE UP (ref 14–18)
HGB BLD CALC-MCNC: 17.9 G/DL — SIGNIFICANT CHANGE UP (ref 14–18)
HGB BLD-MCNC: 10.5 G/DL — LOW (ref 12–16)
HGB BLD-MCNC: 10.8 G/DL — LOW (ref 12–16)
HGB BLD-MCNC: 10.9 G/DL — LOW (ref 12–16)
HGB BLD-MCNC: 11.3 G/DL — LOW (ref 12–16)
HGB BLD-MCNC: 11.3 G/DL — LOW (ref 12–16)
HGB BLD-MCNC: 11.4 G/DL — LOW (ref 12–16)
HGB BLD-MCNC: 11.5 G/DL — LOW (ref 12–16)
HGB BLD-MCNC: 11.7 G/DL — LOW (ref 12–16)
HGB BLD-MCNC: 11.9 G/DL — LOW (ref 12–16)
HGB BLD-MCNC: 12 G/DL — SIGNIFICANT CHANGE UP (ref 12–16)
HGB BLD-MCNC: 12.2 G/DL — SIGNIFICANT CHANGE UP (ref 12–16)
HGB BLD-MCNC: 12.3 G/DL — SIGNIFICANT CHANGE UP (ref 12–16)
HGB BLD-MCNC: 12.3 G/DL — SIGNIFICANT CHANGE UP (ref 12–16)
HGB BLD-MCNC: 12.4 G/DL — SIGNIFICANT CHANGE UP (ref 12–16)
HGB BLD-MCNC: 12.4 G/DL — SIGNIFICANT CHANGE UP (ref 12–16)
HGB BLD-MCNC: 12.5 G/DL — SIGNIFICANT CHANGE UP (ref 12–16)
HGB BLD-MCNC: 12.5 G/DL — SIGNIFICANT CHANGE UP (ref 12–16)
HGB BLD-MCNC: 12.6 G/DL — SIGNIFICANT CHANGE UP (ref 12–16)
HGB BLD-MCNC: 12.7 G/DL — SIGNIFICANT CHANGE UP (ref 12–16)
HGB BLD-MCNC: 12.9 G/DL — SIGNIFICANT CHANGE UP (ref 12–16)
HGB BLD-MCNC: 13 G/DL — SIGNIFICANT CHANGE UP (ref 12–16)
HGB BLD-MCNC: 13.1 G/DL — SIGNIFICANT CHANGE UP (ref 12–16)
HGB BLD-MCNC: 13.2 G/DL — SIGNIFICANT CHANGE UP (ref 12–16)
HGB BLD-MCNC: 13.4 G/DL — SIGNIFICANT CHANGE UP (ref 12–16)
HGB BLD-MCNC: 13.7 G/DL — SIGNIFICANT CHANGE UP (ref 12–16)
HGB BLD-MCNC: 14.3 G/DL — SIGNIFICANT CHANGE UP (ref 12–16)
HGB BLD-MCNC: 14.5 G/DL — SIGNIFICANT CHANGE UP (ref 12–16)
HGB BLD-MCNC: 14.9 G/DL — SIGNIFICANT CHANGE UP (ref 12–16)
HYALINE CASTS # UR AUTO: 0 /LPF — SIGNIFICANT CHANGE UP (ref 0–7)
HYALINE CASTS # UR AUTO: 35 /LPF — HIGH (ref 0–7)
IMM GRANULOCYTES NFR BLD AUTO: 0.2 % — SIGNIFICANT CHANGE UP (ref 0.1–0.3)
IMM GRANULOCYTES NFR BLD AUTO: 0.4 % — HIGH (ref 0.1–0.3)
IMM GRANULOCYTES NFR BLD AUTO: 0.5 % — HIGH (ref 0.1–0.3)
IMM GRANULOCYTES NFR BLD AUTO: 0.6 % — HIGH (ref 0.1–0.3)
IMM GRANULOCYTES NFR BLD AUTO: 0.6 % — HIGH (ref 0.1–0.3)
IMM GRANULOCYTES NFR BLD AUTO: 0.9 % — HIGH (ref 0.1–0.3)
IMM GRANULOCYTES NFR BLD AUTO: 1 % — HIGH (ref 0.1–0.3)
IMM GRANULOCYTES NFR BLD AUTO: 1 % — HIGH (ref 0.1–0.3)
IMM GRANULOCYTES NFR BLD AUTO: 1.5 % — HIGH (ref 0.1–0.3)
IMM GRANULOCYTES NFR BLD AUTO: 1.7 % — HIGH (ref 0.1–0.3)
IMM GRANULOCYTES NFR BLD AUTO: 1.7 % — HIGH (ref 0.1–0.3)
IMM GRANULOCYTES NFR BLD AUTO: 2 % — HIGH (ref 0.1–0.3)
IMM GRANULOCYTES NFR BLD AUTO: 2.2 % — HIGH (ref 0.1–0.3)
IMM GRANULOCYTES NFR BLD AUTO: 2.3 % — HIGH (ref 0.1–0.3)
IMM GRANULOCYTES NFR BLD AUTO: 2.7 % — HIGH (ref 0.1–0.3)
IMM GRANULOCYTES NFR BLD AUTO: 2.7 % — HIGH (ref 0.1–0.3)
IMM GRANULOCYTES NFR BLD AUTO: 3 % — HIGH (ref 0.1–0.3)
IMM GRANULOCYTES NFR BLD AUTO: 3.1 % — HIGH (ref 0.1–0.3)
IMM GRANULOCYTES NFR BLD AUTO: 5.5 % — HIGH (ref 0.1–0.3)
IMM GRANULOCYTES NFR BLD AUTO: 9.6 % — HIGH (ref 0.1–0.3)
INR BLD: 1.18 RATIO — SIGNIFICANT CHANGE UP (ref 0.65–1.3)
INR BLD: 1.25 RATIO — SIGNIFICANT CHANGE UP (ref 0.65–1.3)
K OXYTOCA DNA BLD POS QL NAA+NON-PROBE: SIGNIFICANT CHANGE UP
KETONES UR-MCNC: NEGATIVE — SIGNIFICANT CHANGE UP
KETONES UR-MCNC: NEGATIVE — SIGNIFICANT CHANGE UP
L MONOCYTOG DNA BLD POS QL NAA+NON-PROBE: SIGNIFICANT CHANGE UP
LACTATE BLDV-MCNC: 2.4 MMOL/L — HIGH (ref 0.5–1.6)
LACTATE BLDV-MCNC: 4.2 MMOL/L — HIGH (ref 0.5–1.6)
LACTATE BLDV-MCNC: 4.8 MMOL/L — HIGH (ref 0.5–1.6)
LACTATE SERPL-SCNC: 1.1 MMOL/L — SIGNIFICANT CHANGE UP (ref 0.5–2.2)
LACTATE SERPL-SCNC: 1.3 MMOL/L — SIGNIFICANT CHANGE UP (ref 0.5–2.2)
LACTATE SERPL-SCNC: 2.7 MMOL/L — HIGH (ref 0.5–2.2)
LACTATE SERPL-SCNC: 3.1 MMOL/L — HIGH (ref 0.5–2.2)
LACTATE SERPL-SCNC: 4.7 MMOL/L — CRITICAL HIGH (ref 0.5–2.2)
LEUKOCYTE ESTERASE UR-ACNC: ABNORMAL
LEUKOCYTE ESTERASE UR-ACNC: NEGATIVE — SIGNIFICANT CHANGE UP
LIDOCAIN IGE QN: 19 U/L — SIGNIFICANT CHANGE UP (ref 7–60)
LYMPHOCYTES # BLD AUTO: 0.39 K/UL — LOW (ref 1.2–3.4)
LYMPHOCYTES # BLD AUTO: 0.78 K/UL — LOW (ref 1.2–3.4)
LYMPHOCYTES # BLD AUTO: 0.8 K/UL — LOW (ref 1.2–3.4)
LYMPHOCYTES # BLD AUTO: 0.85 K/UL — LOW (ref 1.2–3.4)
LYMPHOCYTES # BLD AUTO: 0.85 K/UL — LOW (ref 1.2–3.4)
LYMPHOCYTES # BLD AUTO: 0.89 K/UL — LOW (ref 1.2–3.4)
LYMPHOCYTES # BLD AUTO: 0.89 K/UL — LOW (ref 1.2–3.4)
LYMPHOCYTES # BLD AUTO: 0.9 % — LOW (ref 20.5–51.1)
LYMPHOCYTES # BLD AUTO: 0.91 K/UL — LOW (ref 1.2–3.4)
LYMPHOCYTES # BLD AUTO: 0.94 K/UL — LOW (ref 1.2–3.4)
LYMPHOCYTES # BLD AUTO: 1.02 K/UL — LOW (ref 1.2–3.4)
LYMPHOCYTES # BLD AUTO: 1.02 K/UL — LOW (ref 1.2–3.4)
LYMPHOCYTES # BLD AUTO: 1.06 K/UL — LOW (ref 1.2–3.4)
LYMPHOCYTES # BLD AUTO: 1.06 K/UL — LOW (ref 1.2–3.4)
LYMPHOCYTES # BLD AUTO: 1.14 K/UL — LOW (ref 1.2–3.4)
LYMPHOCYTES # BLD AUTO: 1.16 K/UL — LOW (ref 1.2–3.4)
LYMPHOCYTES # BLD AUTO: 1.22 K/UL — SIGNIFICANT CHANGE UP (ref 1.2–3.4)
LYMPHOCYTES # BLD AUTO: 1.24 K/UL — SIGNIFICANT CHANGE UP (ref 1.2–3.4)
LYMPHOCYTES # BLD AUTO: 1.25 K/UL — SIGNIFICANT CHANGE UP (ref 1.2–3.4)
LYMPHOCYTES # BLD AUTO: 1.29 K/UL — SIGNIFICANT CHANGE UP (ref 1.2–3.4)
LYMPHOCYTES # BLD AUTO: 1.42 K/UL — SIGNIFICANT CHANGE UP (ref 1.2–3.4)
LYMPHOCYTES # BLD AUTO: 1.53 K/UL — SIGNIFICANT CHANGE UP (ref 1.2–3.4)
LYMPHOCYTES # BLD AUTO: 1.59 K/UL — SIGNIFICANT CHANGE UP (ref 1.2–3.4)
LYMPHOCYTES # BLD AUTO: 1.63 K/UL — SIGNIFICANT CHANGE UP (ref 1.2–3.4)
LYMPHOCYTES # BLD AUTO: 1.8 K/UL — SIGNIFICANT CHANGE UP (ref 1.2–3.4)
LYMPHOCYTES # BLD AUTO: 1.82 K/UL — SIGNIFICANT CHANGE UP (ref 1.2–3.4)
LYMPHOCYTES # BLD AUTO: 13.3 % — LOW (ref 20.5–51.1)
LYMPHOCYTES # BLD AUTO: 13.5 % — LOW (ref 20.5–51.1)
LYMPHOCYTES # BLD AUTO: 14.2 % — LOW (ref 20.5–51.1)
LYMPHOCYTES # BLD AUTO: 14.7 % — LOW (ref 20.5–51.1)
LYMPHOCYTES # BLD AUTO: 16.1 % — LOW (ref 20.5–51.1)
LYMPHOCYTES # BLD AUTO: 17.8 % — LOW (ref 20.5–51.1)
LYMPHOCYTES # BLD AUTO: 18.6 % — LOW (ref 20.5–51.1)
LYMPHOCYTES # BLD AUTO: 19.3 % — LOW (ref 20.5–51.1)
LYMPHOCYTES # BLD AUTO: 19.4 % — LOW (ref 20.5–51.1)
LYMPHOCYTES # BLD AUTO: 2.1 % — LOW (ref 20.5–51.1)
LYMPHOCYTES # BLD AUTO: 2.7 % — LOW (ref 20.5–51.1)
LYMPHOCYTES # BLD AUTO: 22.7 % — SIGNIFICANT CHANGE UP (ref 20.5–51.1)
LYMPHOCYTES # BLD AUTO: 22.7 % — SIGNIFICANT CHANGE UP (ref 20.5–51.1)
LYMPHOCYTES # BLD AUTO: 23.3 % — SIGNIFICANT CHANGE UP (ref 20.5–51.1)
LYMPHOCYTES # BLD AUTO: 24.1 % — SIGNIFICANT CHANGE UP (ref 20.5–51.1)
LYMPHOCYTES # BLD AUTO: 24.3 % — SIGNIFICANT CHANGE UP (ref 20.5–51.1)
LYMPHOCYTES # BLD AUTO: 3.5 % — LOW (ref 20.5–51.1)
LYMPHOCYTES # BLD AUTO: 3.6 % — LOW (ref 20.5–51.1)
LYMPHOCYTES # BLD AUTO: 3.9 % — LOW (ref 20.5–51.1)
LYMPHOCYTES # BLD AUTO: 4.4 % — LOW (ref 20.5–51.1)
LYMPHOCYTES # BLD AUTO: 4.5 % — LOW (ref 20.5–51.1)
LYMPHOCYTES # BLD AUTO: 41 % — SIGNIFICANT CHANGE UP (ref 20.5–51.1)
LYMPHOCYTES # BLD AUTO: 6.3 % — LOW (ref 20.5–51.1)
LYMPHOCYTES # BLD AUTO: 9.4 % — LOW (ref 20.5–51.1)
MAGNESIUM SERPL-MCNC: 1.4 MG/DL — LOW (ref 1.8–2.4)
MAGNESIUM SERPL-MCNC: 1.4 MG/DL — LOW (ref 1.8–2.4)
MAGNESIUM SERPL-MCNC: 1.6 MG/DL — LOW (ref 1.8–2.4)
MAGNESIUM SERPL-MCNC: 1.7 MG/DL — LOW (ref 1.8–2.4)
MAGNESIUM SERPL-MCNC: 1.8 MG/DL — SIGNIFICANT CHANGE UP (ref 1.8–2.4)
MAGNESIUM SERPL-MCNC: 1.9 MG/DL — SIGNIFICANT CHANGE UP (ref 1.8–2.4)
MAGNESIUM SERPL-MCNC: 2 MG/DL — SIGNIFICANT CHANGE UP (ref 1.8–2.4)
MAGNESIUM SERPL-MCNC: 2 MG/DL — SIGNIFICANT CHANGE UP (ref 1.8–2.4)
MAGNESIUM SERPL-MCNC: 2.2 MG/DL — SIGNIFICANT CHANGE UP (ref 1.8–2.4)
MAGNESIUM SERPL-MCNC: 2.2 MG/DL — SIGNIFICANT CHANGE UP (ref 1.8–2.4)
MANUAL SMEAR VERIFICATION: SIGNIFICANT CHANGE UP
MCHC RBC-ENTMCNC: 28.6 PG — SIGNIFICANT CHANGE UP (ref 27–31)
MCHC RBC-ENTMCNC: 29 PG — SIGNIFICANT CHANGE UP (ref 27–31)
MCHC RBC-ENTMCNC: 29.4 PG — SIGNIFICANT CHANGE UP (ref 27–31)
MCHC RBC-ENTMCNC: 29.5 PG — SIGNIFICANT CHANGE UP (ref 27–31)
MCHC RBC-ENTMCNC: 29.6 PG — SIGNIFICANT CHANGE UP (ref 27–31)
MCHC RBC-ENTMCNC: 29.7 PG — SIGNIFICANT CHANGE UP (ref 27–31)
MCHC RBC-ENTMCNC: 29.7 PG — SIGNIFICANT CHANGE UP (ref 27–31)
MCHC RBC-ENTMCNC: 29.8 PG — SIGNIFICANT CHANGE UP (ref 27–31)
MCHC RBC-ENTMCNC: 29.9 PG — SIGNIFICANT CHANGE UP (ref 27–31)
MCHC RBC-ENTMCNC: 29.9 PG — SIGNIFICANT CHANGE UP (ref 27–31)
MCHC RBC-ENTMCNC: 30 PG — SIGNIFICANT CHANGE UP (ref 27–31)
MCHC RBC-ENTMCNC: 30 PG — SIGNIFICANT CHANGE UP (ref 27–31)
MCHC RBC-ENTMCNC: 30.2 PG — SIGNIFICANT CHANGE UP (ref 27–31)
MCHC RBC-ENTMCNC: 30.3 PG — SIGNIFICANT CHANGE UP (ref 27–31)
MCHC RBC-ENTMCNC: 30.6 PG — SIGNIFICANT CHANGE UP (ref 27–31)
MCHC RBC-ENTMCNC: 30.9 PG — SIGNIFICANT CHANGE UP (ref 27–31)
MCHC RBC-ENTMCNC: 31.3 G/DL — LOW (ref 32–37)
MCHC RBC-ENTMCNC: 31.3 G/DL — LOW (ref 32–37)
MCHC RBC-ENTMCNC: 31.4 G/DL — LOW (ref 32–37)
MCHC RBC-ENTMCNC: 31.5 G/DL — LOW (ref 32–37)
MCHC RBC-ENTMCNC: 31.8 G/DL — LOW (ref 32–37)
MCHC RBC-ENTMCNC: 31.9 G/DL — LOW (ref 32–37)
MCHC RBC-ENTMCNC: 31.9 G/DL — LOW (ref 32–37)
MCHC RBC-ENTMCNC: 32 G/DL — SIGNIFICANT CHANGE UP (ref 32–37)
MCHC RBC-ENTMCNC: 32.1 G/DL — SIGNIFICANT CHANGE UP (ref 32–37)
MCHC RBC-ENTMCNC: 32.2 G/DL — SIGNIFICANT CHANGE UP (ref 32–37)
MCHC RBC-ENTMCNC: 32.4 G/DL — SIGNIFICANT CHANGE UP (ref 32–37)
MCHC RBC-ENTMCNC: 32.7 G/DL — SIGNIFICANT CHANGE UP (ref 32–37)
MCHC RBC-ENTMCNC: 32.8 G/DL — SIGNIFICANT CHANGE UP (ref 32–37)
MCHC RBC-ENTMCNC: 32.8 G/DL — SIGNIFICANT CHANGE UP (ref 32–37)
MCHC RBC-ENTMCNC: 32.9 G/DL — SIGNIFICANT CHANGE UP (ref 32–37)
MCHC RBC-ENTMCNC: 33 G/DL — SIGNIFICANT CHANGE UP (ref 32–37)
MCHC RBC-ENTMCNC: 33 G/DL — SIGNIFICANT CHANGE UP (ref 32–37)
MCHC RBC-ENTMCNC: 33.1 G/DL — SIGNIFICANT CHANGE UP (ref 32–37)
MCHC RBC-ENTMCNC: 33.2 G/DL — SIGNIFICANT CHANGE UP (ref 32–37)
MCHC RBC-ENTMCNC: 33.2 G/DL — SIGNIFICANT CHANGE UP (ref 32–37)
MCHC RBC-ENTMCNC: 33.3 G/DL — SIGNIFICANT CHANGE UP (ref 32–37)
MCHC RBC-ENTMCNC: 33.6 G/DL — SIGNIFICANT CHANGE UP (ref 32–37)
MCHC RBC-ENTMCNC: 33.6 G/DL — SIGNIFICANT CHANGE UP (ref 32–37)
MCHC RBC-ENTMCNC: 34.5 G/DL — SIGNIFICANT CHANGE UP (ref 32–37)
MCHC RBC-ENTMCNC: 34.8 G/DL — SIGNIFICANT CHANGE UP (ref 32–37)
MCHC RBC-ENTMCNC: 35 G/DL — SIGNIFICANT CHANGE UP (ref 32–37)
MCV RBC AUTO: 86.9 FL — SIGNIFICANT CHANGE UP (ref 81–99)
MCV RBC AUTO: 87 FL — SIGNIFICANT CHANGE UP (ref 81–99)
MCV RBC AUTO: 88.1 FL — SIGNIFICANT CHANGE UP (ref 81–99)
MCV RBC AUTO: 88.1 FL — SIGNIFICANT CHANGE UP (ref 81–99)
MCV RBC AUTO: 88.9 FL — SIGNIFICANT CHANGE UP (ref 81–99)
MCV RBC AUTO: 89.1 FL — SIGNIFICANT CHANGE UP (ref 81–99)
MCV RBC AUTO: 89.3 FL — SIGNIFICANT CHANGE UP (ref 81–99)
MCV RBC AUTO: 89.8 FL — SIGNIFICANT CHANGE UP (ref 81–99)
MCV RBC AUTO: 90.1 FL — SIGNIFICANT CHANGE UP (ref 81–99)
MCV RBC AUTO: 90.1 FL — SIGNIFICANT CHANGE UP (ref 81–99)
MCV RBC AUTO: 90.6 FL — SIGNIFICANT CHANGE UP (ref 81–99)
MCV RBC AUTO: 90.9 FL — SIGNIFICANT CHANGE UP (ref 81–99)
MCV RBC AUTO: 91.2 FL — SIGNIFICANT CHANGE UP (ref 81–99)
MCV RBC AUTO: 91.2 FL — SIGNIFICANT CHANGE UP (ref 81–99)
MCV RBC AUTO: 91.5 FL — SIGNIFICANT CHANGE UP (ref 81–99)
MCV RBC AUTO: 91.7 FL — SIGNIFICANT CHANGE UP (ref 81–99)
MCV RBC AUTO: 92 FL — SIGNIFICANT CHANGE UP (ref 81–99)
MCV RBC AUTO: 92.1 FL — SIGNIFICANT CHANGE UP (ref 81–99)
MCV RBC AUTO: 92.3 FL — SIGNIFICANT CHANGE UP (ref 81–99)
MCV RBC AUTO: 92.4 FL — SIGNIFICANT CHANGE UP (ref 81–99)
MCV RBC AUTO: 92.7 FL — SIGNIFICANT CHANGE UP (ref 81–99)
MCV RBC AUTO: 92.9 FL — SIGNIFICANT CHANGE UP (ref 81–99)
MCV RBC AUTO: 93.9 FL — SIGNIFICANT CHANGE UP (ref 81–99)
MCV RBC AUTO: 94.4 FL — SIGNIFICANT CHANGE UP (ref 81–99)
METHOD TYPE: SIGNIFICANT CHANGE UP
MONOCYTES # BLD AUTO: 0.39 K/UL — SIGNIFICANT CHANGE UP (ref 0.1–0.6)
MONOCYTES # BLD AUTO: 0.41 K/UL — SIGNIFICANT CHANGE UP (ref 0.1–0.6)
MONOCYTES # BLD AUTO: 0.42 K/UL — SIGNIFICANT CHANGE UP (ref 0.1–0.6)
MONOCYTES # BLD AUTO: 0.43 K/UL — SIGNIFICANT CHANGE UP (ref 0.1–0.6)
MONOCYTES # BLD AUTO: 0.44 K/UL — SIGNIFICANT CHANGE UP (ref 0.1–0.6)
MONOCYTES # BLD AUTO: 0.47 K/UL — SIGNIFICANT CHANGE UP (ref 0.1–0.6)
MONOCYTES # BLD AUTO: 0.54 K/UL — SIGNIFICANT CHANGE UP (ref 0.1–0.6)
MONOCYTES # BLD AUTO: 0.54 K/UL — SIGNIFICANT CHANGE UP (ref 0.1–0.6)
MONOCYTES # BLD AUTO: 0.56 K/UL — SIGNIFICANT CHANGE UP (ref 0.1–0.6)
MONOCYTES # BLD AUTO: 0.64 K/UL — HIGH (ref 0.1–0.6)
MONOCYTES # BLD AUTO: 0.66 K/UL — HIGH (ref 0.1–0.6)
MONOCYTES # BLD AUTO: 0.66 K/UL — HIGH (ref 0.1–0.6)
MONOCYTES # BLD AUTO: 0.68 K/UL — HIGH (ref 0.1–0.6)
MONOCYTES # BLD AUTO: 0.73 K/UL — HIGH (ref 0.1–0.6)
MONOCYTES # BLD AUTO: 0.78 K/UL — HIGH (ref 0.1–0.6)
MONOCYTES # BLD AUTO: 0.81 K/UL — HIGH (ref 0.1–0.6)
MONOCYTES # BLD AUTO: 0.82 K/UL — HIGH (ref 0.1–0.6)
MONOCYTES # BLD AUTO: 0.89 K/UL — HIGH (ref 0.1–0.6)
MONOCYTES # BLD AUTO: 0.96 K/UL — HIGH (ref 0.1–0.6)
MONOCYTES # BLD AUTO: 1.01 K/UL — HIGH (ref 0.1–0.6)
MONOCYTES # BLD AUTO: 1.16 K/UL — HIGH (ref 0.1–0.6)
MONOCYTES # BLD AUTO: 1.4 K/UL — HIGH (ref 0.1–0.6)
MONOCYTES # BLD AUTO: 1.47 K/UL — HIGH (ref 0.1–0.6)
MONOCYTES # BLD AUTO: 1.57 K/UL — HIGH (ref 0.1–0.6)
MONOCYTES # BLD AUTO: 2.06 K/UL — HIGH (ref 0.1–0.6)
MONOCYTES NFR BLD AUTO: 0.9 % — LOW (ref 1.7–9.3)
MONOCYTES NFR BLD AUTO: 10.1 % — HIGH (ref 1.7–9.3)
MONOCYTES NFR BLD AUTO: 11.2 % — HIGH (ref 1.7–9.3)
MONOCYTES NFR BLD AUTO: 11.5 % — HIGH (ref 1.7–9.3)
MONOCYTES NFR BLD AUTO: 11.7 % — HIGH (ref 1.7–9.3)
MONOCYTES NFR BLD AUTO: 11.8 % — HIGH (ref 1.7–9.3)
MONOCYTES NFR BLD AUTO: 12.1 % — HIGH (ref 1.7–9.3)
MONOCYTES NFR BLD AUTO: 2.3 % — SIGNIFICANT CHANGE UP (ref 1.7–9.3)
MONOCYTES NFR BLD AUTO: 3.1 % — SIGNIFICANT CHANGE UP (ref 1.7–9.3)
MONOCYTES NFR BLD AUTO: 3.2 % — SIGNIFICANT CHANGE UP (ref 1.7–9.3)
MONOCYTES NFR BLD AUTO: 4 % — SIGNIFICANT CHANGE UP (ref 1.7–9.3)
MONOCYTES NFR BLD AUTO: 4 % — SIGNIFICANT CHANGE UP (ref 1.7–9.3)
MONOCYTES NFR BLD AUTO: 5.1 % — SIGNIFICANT CHANGE UP (ref 1.7–9.3)
MONOCYTES NFR BLD AUTO: 6.1 % — SIGNIFICANT CHANGE UP (ref 1.7–9.3)
MONOCYTES NFR BLD AUTO: 7 % — SIGNIFICANT CHANGE UP (ref 1.7–9.3)
MONOCYTES NFR BLD AUTO: 7.1 % — SIGNIFICANT CHANGE UP (ref 1.7–9.3)
MONOCYTES NFR BLD AUTO: 7.4 % — SIGNIFICANT CHANGE UP (ref 1.7–9.3)
MONOCYTES NFR BLD AUTO: 8.1 % — SIGNIFICANT CHANGE UP (ref 1.7–9.3)
MONOCYTES NFR BLD AUTO: 8.2 % — SIGNIFICANT CHANGE UP (ref 1.7–9.3)
MONOCYTES NFR BLD AUTO: 8.6 % — SIGNIFICANT CHANGE UP (ref 1.7–9.3)
MONOCYTES NFR BLD AUTO: 8.8 % — SIGNIFICANT CHANGE UP (ref 1.7–9.3)
MONOCYTES NFR BLD AUTO: 9.2 % — SIGNIFICANT CHANGE UP (ref 1.7–9.3)
MONOCYTES NFR BLD AUTO: 9.4 % — HIGH (ref 1.7–9.3)
MONOCYTES NFR BLD AUTO: 9.7 % — HIGH (ref 1.7–9.3)
MONOCYTES NFR BLD AUTO: 9.9 % — HIGH (ref 1.7–9.3)
MRSA SPEC QL CULT: SIGNIFICANT CHANGE UP
MSSA DNA SPEC QL NAA+PROBE: SIGNIFICANT CHANGE UP
MYELOCYTES NFR BLD: 0.9 % — HIGH (ref 0–0)
N MEN ISLT CULT: SIGNIFICANT CHANGE UP
NEUTROPHILS # BLD AUTO: 11.93 K/UL — HIGH (ref 1.4–6.5)
NEUTROPHILS # BLD AUTO: 14.74 K/UL — HIGH (ref 1.4–6.5)
NEUTROPHILS # BLD AUTO: 18.3 K/UL — HIGH (ref 1.4–6.5)
NEUTROPHILS # BLD AUTO: 2.07 K/UL — SIGNIFICANT CHANGE UP (ref 1.4–6.5)
NEUTROPHILS # BLD AUTO: 20.22 K/UL — HIGH (ref 1.4–6.5)
NEUTROPHILS # BLD AUTO: 21.01 K/UL — HIGH (ref 1.4–6.5)
NEUTROPHILS # BLD AUTO: 24.69 K/UL — HIGH (ref 1.4–6.5)
NEUTROPHILS # BLD AUTO: 3.03 K/UL — SIGNIFICANT CHANGE UP (ref 1.4–6.5)
NEUTROPHILS # BLD AUTO: 3.39 K/UL — SIGNIFICANT CHANGE UP (ref 1.4–6.5)
NEUTROPHILS # BLD AUTO: 3.74 K/UL — SIGNIFICANT CHANGE UP (ref 1.4–6.5)
NEUTROPHILS # BLD AUTO: 31.97 K/UL — HIGH (ref 1.4–6.5)
NEUTROPHILS # BLD AUTO: 4.43 K/UL — SIGNIFICANT CHANGE UP (ref 1.4–6.5)
NEUTROPHILS # BLD AUTO: 4.62 K/UL — SIGNIFICANT CHANGE UP (ref 1.4–6.5)
NEUTROPHILS # BLD AUTO: 4.7 K/UL — SIGNIFICANT CHANGE UP (ref 1.4–6.5)
NEUTROPHILS # BLD AUTO: 4.82 K/UL — SIGNIFICANT CHANGE UP (ref 1.4–6.5)
NEUTROPHILS # BLD AUTO: 4.87 K/UL — SIGNIFICANT CHANGE UP (ref 1.4–6.5)
NEUTROPHILS # BLD AUTO: 4.91 K/UL — SIGNIFICANT CHANGE UP (ref 1.4–6.5)
NEUTROPHILS # BLD AUTO: 42.21 K/UL — HIGH (ref 1.4–6.5)
NEUTROPHILS # BLD AUTO: 45.46 K/UL — HIGH (ref 1.4–6.5)
NEUTROPHILS # BLD AUTO: 5.11 K/UL — SIGNIFICANT CHANGE UP (ref 1.4–6.5)
NEUTROPHILS # BLD AUTO: 5.51 K/UL — SIGNIFICANT CHANGE UP (ref 1.4–6.5)
NEUTROPHILS # BLD AUTO: 5.84 K/UL — SIGNIFICANT CHANGE UP (ref 1.4–6.5)
NEUTROPHILS # BLD AUTO: 5.95 K/UL — SIGNIFICANT CHANGE UP (ref 1.4–6.5)
NEUTROPHILS # BLD AUTO: 6.27 K/UL — SIGNIFICANT CHANGE UP (ref 1.4–6.5)
NEUTROPHILS # BLD AUTO: 7.28 K/UL — HIGH (ref 1.4–6.5)
NEUTROPHILS NFR BLD AUTO: 46.6 % — SIGNIFICANT CHANGE UP (ref 42.2–75.2)
NEUTROPHILS NFR BLD AUTO: 62.3 % — SIGNIFICANT CHANGE UP (ref 42.2–75.2)
NEUTROPHILS NFR BLD AUTO: 63.2 % — SIGNIFICANT CHANGE UP (ref 42.2–75.2)
NEUTROPHILS NFR BLD AUTO: 64.9 % — SIGNIFICANT CHANGE UP (ref 42.2–75.2)
NEUTROPHILS NFR BLD AUTO: 65.7 % — SIGNIFICANT CHANGE UP (ref 42.2–75.2)
NEUTROPHILS NFR BLD AUTO: 66.5 % — SIGNIFICANT CHANGE UP (ref 42.2–75.2)
NEUTROPHILS NFR BLD AUTO: 67.1 % — SIGNIFICANT CHANGE UP (ref 42.2–75.2)
NEUTROPHILS NFR BLD AUTO: 67.2 % — SIGNIFICANT CHANGE UP (ref 42.2–75.2)
NEUTROPHILS NFR BLD AUTO: 67.9 % — SIGNIFICANT CHANGE UP (ref 42.2–75.2)
NEUTROPHILS NFR BLD AUTO: 70.1 % — SIGNIFICANT CHANGE UP (ref 42.2–75.2)
NEUTROPHILS NFR BLD AUTO: 70.8 % — SIGNIFICANT CHANGE UP (ref 42.2–75.2)
NEUTROPHILS NFR BLD AUTO: 71.3 % — SIGNIFICANT CHANGE UP (ref 42.2–75.2)
NEUTROPHILS NFR BLD AUTO: 72.7 % — SIGNIFICANT CHANGE UP (ref 42.2–75.2)
NEUTROPHILS NFR BLD AUTO: 73.1 % — SIGNIFICANT CHANGE UP (ref 42.2–75.2)
NEUTROPHILS NFR BLD AUTO: 74.6 % — SIGNIFICANT CHANGE UP (ref 42.2–75.2)
NEUTROPHILS NFR BLD AUTO: 75.2 % — SIGNIFICANT CHANGE UP (ref 42.2–75.2)
NEUTROPHILS NFR BLD AUTO: 77.9 % — HIGH (ref 42.2–75.2)
NEUTROPHILS NFR BLD AUTO: 81.7 % — HIGH (ref 42.2–75.2)
NEUTROPHILS NFR BLD AUTO: 83.7 % — HIGH (ref 42.2–75.2)
NEUTROPHILS NFR BLD AUTO: 88.8 % — HIGH (ref 42.2–75.2)
NEUTROPHILS NFR BLD AUTO: 88.8 % — HIGH (ref 42.2–75.2)
NEUTROPHILS NFR BLD AUTO: 89.8 % — HIGH (ref 42.2–75.2)
NEUTROPHILS NFR BLD AUTO: 90.5 % — HIGH (ref 42.2–75.2)
NEUTROPHILS NFR BLD AUTO: 91 % — HIGH (ref 42.2–75.2)
NEUTROPHILS NFR BLD AUTO: 91.6 % — HIGH (ref 42.2–75.2)
NEUTS BAND # BLD: 5.4 % — SIGNIFICANT CHANGE UP (ref 0–6)
NITRITE UR-MCNC: NEGATIVE — SIGNIFICANT CHANGE UP
NITRITE UR-MCNC: NEGATIVE — SIGNIFICANT CHANGE UP
NRBC # BLD: 0 /100 WBCS — SIGNIFICANT CHANGE UP (ref 0–0)
ORGANISM # SPEC MICROSCOPIC CNT: SIGNIFICANT CHANGE UP
P AERUGINOSA DNA BLD POS NAA+NON-PROBE: SIGNIFICANT CHANGE UP
PCO2 BLDV: 50 MMHG — SIGNIFICANT CHANGE UP (ref 41–51)
PCO2 BLDV: 50 MMHG — SIGNIFICANT CHANGE UP (ref 41–51)
PCO2 BLDV: 59 MMHG — HIGH (ref 41–51)
PH BLDV: 7.27 — SIGNIFICANT CHANGE UP (ref 7.26–7.43)
PH BLDV: 7.33 — SIGNIFICANT CHANGE UP (ref 7.26–7.43)
PH BLDV: 7.34 — SIGNIFICANT CHANGE UP (ref 7.26–7.43)
PH UR: 5.5 — SIGNIFICANT CHANGE UP (ref 5–8)
PH UR: 8.5 — HIGH (ref 5–8)
PHOSPHATE SERPL-MCNC: 3.9 MG/DL — SIGNIFICANT CHANGE UP (ref 2.1–4.9)
PLAT MORPH BLD: NORMAL — SIGNIFICANT CHANGE UP
PLATELET # BLD AUTO: 103 K/UL — LOW (ref 130–400)
PLATELET # BLD AUTO: 112 K/UL — LOW (ref 130–400)
PLATELET # BLD AUTO: 113 K/UL — LOW (ref 130–400)
PLATELET # BLD AUTO: 115 K/UL — LOW (ref 130–400)
PLATELET # BLD AUTO: 117 K/UL — LOW (ref 130–400)
PLATELET # BLD AUTO: 120 K/UL — LOW (ref 130–400)
PLATELET # BLD AUTO: 147 K/UL — SIGNIFICANT CHANGE UP (ref 130–400)
PLATELET # BLD AUTO: 148 K/UL — SIGNIFICANT CHANGE UP (ref 130–400)
PLATELET # BLD AUTO: 157 K/UL — SIGNIFICANT CHANGE UP (ref 130–400)
PLATELET # BLD AUTO: 159 K/UL — SIGNIFICANT CHANGE UP (ref 130–400)
PLATELET # BLD AUTO: 171 K/UL — SIGNIFICANT CHANGE UP (ref 130–400)
PLATELET # BLD AUTO: 177 K/UL — SIGNIFICANT CHANGE UP (ref 130–400)
PLATELET # BLD AUTO: 184 K/UL — SIGNIFICANT CHANGE UP (ref 130–400)
PLATELET # BLD AUTO: 187 K/UL — SIGNIFICANT CHANGE UP (ref 130–400)
PLATELET # BLD AUTO: 198 K/UL — SIGNIFICANT CHANGE UP (ref 130–400)
PLATELET # BLD AUTO: 219 K/UL — SIGNIFICANT CHANGE UP (ref 130–400)
PLATELET # BLD AUTO: 223 K/UL — SIGNIFICANT CHANGE UP (ref 130–400)
PLATELET # BLD AUTO: 234 K/UL — SIGNIFICANT CHANGE UP (ref 130–400)
PLATELET # BLD AUTO: 245 K/UL — SIGNIFICANT CHANGE UP (ref 130–400)
PLATELET # BLD AUTO: 256 K/UL — SIGNIFICANT CHANGE UP (ref 130–400)
PLATELET # BLD AUTO: 285 K/UL — SIGNIFICANT CHANGE UP (ref 130–400)
PLATELET # BLD AUTO: 306 K/UL — SIGNIFICANT CHANGE UP (ref 130–400)
PLATELET # BLD AUTO: 329 K/UL — SIGNIFICANT CHANGE UP (ref 130–400)
PLATELET # BLD AUTO: 330 K/UL — SIGNIFICANT CHANGE UP (ref 130–400)
PLATELET # BLD AUTO: 87 K/UL — LOW (ref 130–400)
PLATELET # BLD AUTO: 94 K/UL — LOW (ref 130–400)
PO2 BLDV: 14 MMHG — LOW (ref 20–40)
PO2 BLDV: 16 MMHG — LOW (ref 20–40)
PO2 BLDV: 26 MMHG — SIGNIFICANT CHANGE UP (ref 20–40)
POIKILOCYTOSIS BLD QL AUTO: SIGNIFICANT CHANGE UP
POTASSIUM BLDV-SCNC: 3.5 MMOL/L — SIGNIFICANT CHANGE UP (ref 3.3–5.6)
POTASSIUM BLDV-SCNC: 3.9 MMOL/L — SIGNIFICANT CHANGE UP (ref 3.3–5.6)
POTASSIUM BLDV-SCNC: 4.2 MMOL/L — SIGNIFICANT CHANGE UP (ref 3.3–5.6)
POTASSIUM SERPL-MCNC: 3.1 MMOL/L — LOW (ref 3.5–5)
POTASSIUM SERPL-MCNC: 3.2 MMOL/L — LOW (ref 3.5–5)
POTASSIUM SERPL-MCNC: 3.3 MMOL/L — LOW (ref 3.5–5)
POTASSIUM SERPL-MCNC: 3.4 MMOL/L — LOW (ref 3.5–5)
POTASSIUM SERPL-MCNC: 3.5 MMOL/L — SIGNIFICANT CHANGE UP (ref 3.5–5)
POTASSIUM SERPL-MCNC: 3.7 MMOL/L — SIGNIFICANT CHANGE UP (ref 3.5–5)
POTASSIUM SERPL-MCNC: 3.8 MMOL/L — SIGNIFICANT CHANGE UP (ref 3.5–5)
POTASSIUM SERPL-MCNC: 3.8 MMOL/L — SIGNIFICANT CHANGE UP (ref 3.5–5)
POTASSIUM SERPL-MCNC: 3.9 MMOL/L — SIGNIFICANT CHANGE UP (ref 3.5–5)
POTASSIUM SERPL-MCNC: 4.2 MMOL/L — SIGNIFICANT CHANGE UP (ref 3.5–5)
POTASSIUM SERPL-MCNC: 4.2 MMOL/L — SIGNIFICANT CHANGE UP (ref 3.5–5)
POTASSIUM SERPL-MCNC: 4.3 MMOL/L — SIGNIFICANT CHANGE UP (ref 3.5–5)
POTASSIUM SERPL-MCNC: 4.4 MMOL/L — SIGNIFICANT CHANGE UP (ref 3.5–5)
POTASSIUM SERPL-MCNC: 4.5 MMOL/L — SIGNIFICANT CHANGE UP (ref 3.5–5)
POTASSIUM SERPL-MCNC: 4.7 MMOL/L — SIGNIFICANT CHANGE UP (ref 3.5–5)
POTASSIUM SERPL-MCNC: 4.8 MMOL/L — SIGNIFICANT CHANGE UP (ref 3.5–5)
POTASSIUM SERPL-MCNC: 4.8 MMOL/L — SIGNIFICANT CHANGE UP (ref 3.5–5)
POTASSIUM SERPL-MCNC: 5.1 MMOL/L — HIGH (ref 3.5–5)
POTASSIUM SERPL-SCNC: 3.1 MMOL/L — LOW (ref 3.5–5)
POTASSIUM SERPL-SCNC: 3.2 MMOL/L — LOW (ref 3.5–5)
POTASSIUM SERPL-SCNC: 3.3 MMOL/L — LOW (ref 3.5–5)
POTASSIUM SERPL-SCNC: 3.4 MMOL/L — LOW (ref 3.5–5)
POTASSIUM SERPL-SCNC: 3.5 MMOL/L — SIGNIFICANT CHANGE UP (ref 3.5–5)
POTASSIUM SERPL-SCNC: 3.7 MMOL/L — SIGNIFICANT CHANGE UP (ref 3.5–5)
POTASSIUM SERPL-SCNC: 3.8 MMOL/L — SIGNIFICANT CHANGE UP (ref 3.5–5)
POTASSIUM SERPL-SCNC: 3.8 MMOL/L — SIGNIFICANT CHANGE UP (ref 3.5–5)
POTASSIUM SERPL-SCNC: 3.9 MMOL/L — SIGNIFICANT CHANGE UP (ref 3.5–5)
POTASSIUM SERPL-SCNC: 4.2 MMOL/L — SIGNIFICANT CHANGE UP (ref 3.5–5)
POTASSIUM SERPL-SCNC: 4.2 MMOL/L — SIGNIFICANT CHANGE UP (ref 3.5–5)
POTASSIUM SERPL-SCNC: 4.3 MMOL/L — SIGNIFICANT CHANGE UP (ref 3.5–5)
POTASSIUM SERPL-SCNC: 4.4 MMOL/L — SIGNIFICANT CHANGE UP (ref 3.5–5)
POTASSIUM SERPL-SCNC: 4.5 MMOL/L — SIGNIFICANT CHANGE UP (ref 3.5–5)
POTASSIUM SERPL-SCNC: 4.7 MMOL/L — SIGNIFICANT CHANGE UP (ref 3.5–5)
POTASSIUM SERPL-SCNC: 4.8 MMOL/L — SIGNIFICANT CHANGE UP (ref 3.5–5)
POTASSIUM SERPL-SCNC: 4.8 MMOL/L — SIGNIFICANT CHANGE UP (ref 3.5–5)
POTASSIUM SERPL-SCNC: 5.1 MMOL/L — HIGH (ref 3.5–5)
PROT SERPL-MCNC: 4.4 G/DL — LOW (ref 6–8)
PROT SERPL-MCNC: 4.4 G/DL — LOW (ref 6–8)
PROT SERPL-MCNC: 4.6 G/DL — LOW (ref 6–8)
PROT SERPL-MCNC: 4.7 G/DL — LOW (ref 6–8)
PROT SERPL-MCNC: 4.8 G/DL — LOW (ref 6–8)
PROT SERPL-MCNC: 4.9 G/DL — LOW (ref 6–8)
PROT SERPL-MCNC: 5 G/DL — LOW (ref 6–8)
PROT SERPL-MCNC: 5 G/DL — LOW (ref 6–8)
PROT SERPL-MCNC: 5.1 G/DL — LOW (ref 6–8)
PROT SERPL-MCNC: 5.1 G/DL — LOW (ref 6–8)
PROT SERPL-MCNC: 5.2 G/DL — LOW (ref 6–8)
PROT SERPL-MCNC: 5.3 G/DL — LOW (ref 6–8)
PROT SERPL-MCNC: 5.3 G/DL — LOW (ref 6–8)
PROT SERPL-MCNC: 5.4 G/DL — LOW (ref 6–8)
PROT SERPL-MCNC: 5.6 G/DL — LOW (ref 6–8)
PROT SERPL-MCNC: 5.9 G/DL — LOW (ref 6–8)
PROT SERPL-MCNC: 6.2 G/DL — SIGNIFICANT CHANGE UP (ref 6–8)
PROT SERPL-MCNC: 6.5 G/DL — SIGNIFICANT CHANGE UP (ref 6–8)
PROT SERPL-MCNC: 6.7 G/DL — SIGNIFICANT CHANGE UP (ref 6–8)
PROT UR-MCNC: ABNORMAL
PROT UR-MCNC: ABNORMAL
PROTHROM AB SERPL-ACNC: 13.6 SEC — HIGH (ref 9.95–12.87)
PROTHROM AB SERPL-ACNC: 14.3 SEC — HIGH (ref 9.95–12.87)
RBC # BLD: 3.62 M/UL — LOW (ref 4.2–5.4)
RBC # BLD: 3.64 M/UL — LOW (ref 4.2–5.4)
RBC # BLD: 3.74 M/UL — LOW (ref 4.2–5.4)
RBC # BLD: 3.77 M/UL — LOW (ref 4.2–5.4)
RBC # BLD: 3.77 M/UL — LOW (ref 4.2–5.4)
RBC # BLD: 3.83 M/UL — LOW (ref 4.2–5.4)
RBC # BLD: 3.91 M/UL — LOW (ref 4.2–5.4)
RBC # BLD: 3.96 M/UL — LOW (ref 4.2–5.4)
RBC # BLD: 4.02 M/UL — LOW (ref 4.2–5.4)
RBC # BLD: 4.07 M/UL — LOW (ref 4.2–5.4)
RBC # BLD: 4.07 M/UL — LOW (ref 4.2–5.4)
RBC # BLD: 4.08 M/UL — LOW (ref 4.2–5.4)
RBC # BLD: 4.13 M/UL — LOW (ref 4.2–5.4)
RBC # BLD: 4.16 M/UL — LOW (ref 4.2–5.4)
RBC # BLD: 4.16 M/UL — LOW (ref 4.2–5.4)
RBC # BLD: 4.2 M/UL — SIGNIFICANT CHANGE UP (ref 4.2–5.4)
RBC # BLD: 4.21 M/UL — SIGNIFICANT CHANGE UP (ref 4.2–5.4)
RBC # BLD: 4.29 M/UL — SIGNIFICANT CHANGE UP (ref 4.2–5.4)
RBC # BLD: 4.3 M/UL — SIGNIFICANT CHANGE UP (ref 4.2–5.4)
RBC # BLD: 4.34 M/UL — SIGNIFICANT CHANGE UP (ref 4.2–5.4)
RBC # BLD: 4.38 M/UL — SIGNIFICANT CHANGE UP (ref 4.2–5.4)
RBC # BLD: 4.39 M/UL — SIGNIFICANT CHANGE UP (ref 4.2–5.4)
RBC # BLD: 4.42 M/UL — SIGNIFICANT CHANGE UP (ref 4.2–5.4)
RBC # BLD: 4.44 M/UL — SIGNIFICANT CHANGE UP (ref 4.2–5.4)
RBC # BLD: 4.46 M/UL — SIGNIFICANT CHANGE UP (ref 4.2–5.4)
RBC # BLD: 4.73 M/UL — SIGNIFICANT CHANGE UP (ref 4.2–5.4)
RBC # BLD: 4.83 M/UL — SIGNIFICANT CHANGE UP (ref 4.2–5.4)
RBC # BLD: 5.03 M/UL — SIGNIFICANT CHANGE UP (ref 4.2–5.4)
RBC # FLD: 13.7 % — SIGNIFICANT CHANGE UP (ref 11.5–14.5)
RBC # FLD: 13.7 % — SIGNIFICANT CHANGE UP (ref 11.5–14.5)
RBC # FLD: 13.8 % — SIGNIFICANT CHANGE UP (ref 11.5–14.5)
RBC # FLD: 13.9 % — SIGNIFICANT CHANGE UP (ref 11.5–14.5)
RBC # FLD: 13.9 % — SIGNIFICANT CHANGE UP (ref 11.5–14.5)
RBC # FLD: 14.1 % — SIGNIFICANT CHANGE UP (ref 11.5–14.5)
RBC # FLD: 14.3 % — SIGNIFICANT CHANGE UP (ref 11.5–14.5)
RBC # FLD: 14.4 % — SIGNIFICANT CHANGE UP (ref 11.5–14.5)
RBC # FLD: 14.6 % — HIGH (ref 11.5–14.5)
RBC # FLD: 14.7 % — HIGH (ref 11.5–14.5)
RBC # FLD: 14.8 % — HIGH (ref 11.5–14.5)
RBC # FLD: 14.8 % — HIGH (ref 11.5–14.5)
RBC # FLD: 14.9 % — HIGH (ref 11.5–14.5)
RBC # FLD: 14.9 % — HIGH (ref 11.5–14.5)
RBC # FLD: 15 % — HIGH (ref 11.5–14.5)
RBC # FLD: 15.3 % — HIGH (ref 11.5–14.5)
RBC BLD AUTO: ABNORMAL
RBC CASTS # UR COMP ASSIST: 12 /HPF — HIGH (ref 0–4)
RBC CASTS # UR COMP ASSIST: 6 /HPF — HIGH (ref 0–4)
S MARCESCENS DNA BLD POS NAA+NON-PROBE: SIGNIFICANT CHANGE UP
S PNEUM DNA BLD POS QL NAA+NON-PROBE: SIGNIFICANT CHANGE UP
S PYO DNA BLD POS QL NAA+NON-PROBE: SIGNIFICANT CHANGE UP
SAO2 % BLDV: 14 % — SIGNIFICANT CHANGE UP
SAO2 % BLDV: 23 % — SIGNIFICANT CHANGE UP
SAO2 % BLDV: 45 % — SIGNIFICANT CHANGE UP
SODIUM SERPL-SCNC: 132 MMOL/L — LOW (ref 135–146)
SODIUM SERPL-SCNC: 133 MMOL/L — LOW (ref 135–146)
SODIUM SERPL-SCNC: 133 MMOL/L — LOW (ref 135–146)
SODIUM SERPL-SCNC: 136 MMOL/L — SIGNIFICANT CHANGE UP (ref 135–146)
SODIUM SERPL-SCNC: 138 MMOL/L — SIGNIFICANT CHANGE UP (ref 135–146)
SODIUM SERPL-SCNC: 139 MMOL/L — SIGNIFICANT CHANGE UP (ref 135–146)
SODIUM SERPL-SCNC: 140 MMOL/L — SIGNIFICANT CHANGE UP (ref 135–146)
SODIUM SERPL-SCNC: 141 MMOL/L — SIGNIFICANT CHANGE UP (ref 135–146)
SODIUM SERPL-SCNC: 142 MMOL/L — SIGNIFICANT CHANGE UP (ref 135–146)
SODIUM SERPL-SCNC: 143 MMOL/L — SIGNIFICANT CHANGE UP (ref 135–146)
SODIUM SERPL-SCNC: 143 MMOL/L — SIGNIFICANT CHANGE UP (ref 135–146)
SODIUM SERPL-SCNC: 144 MMOL/L — SIGNIFICANT CHANGE UP (ref 135–146)
SODIUM SERPL-SCNC: 145 MMOL/L — SIGNIFICANT CHANGE UP (ref 135–146)
SODIUM SERPL-SCNC: 148 MMOL/L — HIGH (ref 135–146)
SODIUM SERPL-SCNC: 149 MMOL/L — HIGH (ref 135–146)
SP GR SPEC: 1.02 — SIGNIFICANT CHANGE UP (ref 1.01–1.02)
SP GR SPEC: 1.02 — SIGNIFICANT CHANGE UP (ref 1.01–1.02)
SPECIMEN SOURCE: SIGNIFICANT CHANGE UP
TROPONIN T SERPL-MCNC: 0.01 NG/ML — SIGNIFICANT CHANGE UP
TROPONIN T SERPL-MCNC: 0.02 NG/ML — HIGH
TROPONIN T SERPL-MCNC: 0.03 NG/ML — CRITICAL HIGH
TROPONIN T SERPL-MCNC: 0.06 NG/ML — CRITICAL HIGH
UROBILINOGEN FLD QL: SIGNIFICANT CHANGE UP
UROBILINOGEN FLD QL: SIGNIFICANT CHANGE UP
VARIANT LYMPHS # BLD: 0.9 % — SIGNIFICANT CHANGE UP (ref 0–5)
WBC # BLD: 10.53 K/UL — SIGNIFICANT CHANGE UP (ref 4.8–10.8)
WBC # BLD: 13.45 K/UL — HIGH (ref 4.8–10.8)
WBC # BLD: 18.05 K/UL — HIGH (ref 4.8–10.8)
WBC # BLD: 20.37 K/UL — HIGH (ref 4.8–10.8)
WBC # BLD: 22.79 K/UL — HIGH (ref 4.8–10.8)
WBC # BLD: 24.18 K/UL — HIGH (ref 4.8–10.8)
WBC # BLD: 29.49 K/UL — HIGH (ref 4.8–10.8)
WBC # BLD: 35.31 K/UL — HIGH (ref 4.8–10.8)
WBC # BLD: 4.44 K/UL — LOW (ref 4.8–10.8)
WBC # BLD: 4.67 K/UL — LOW (ref 4.8–10.8)
WBC # BLD: 43.79 K/UL — CRITICAL HIGH (ref 4.8–10.8)
WBC # BLD: 49.59 K/UL — CRITICAL HIGH (ref 4.8–10.8)
WBC # BLD: 5 K/UL — SIGNIFICANT CHANGE UP (ref 4.8–10.8)
WBC # BLD: 5.3 K/UL — SIGNIFICANT CHANGE UP (ref 4.8–10.8)
WBC # BLD: 5.36 K/UL — SIGNIFICANT CHANGE UP (ref 4.8–10.8)
WBC # BLD: 5.51 K/UL — SIGNIFICANT CHANGE UP (ref 4.8–10.8)
WBC # BLD: 6.33 K/UL — SIGNIFICANT CHANGE UP (ref 4.8–10.8)
WBC # BLD: 6.58 K/UL — SIGNIFICANT CHANGE UP (ref 4.8–10.8)
WBC # BLD: 6.59 K/UL — SIGNIFICANT CHANGE UP (ref 4.8–10.8)
WBC # BLD: 6.87 K/UL — SIGNIFICANT CHANGE UP (ref 4.8–10.8)
WBC # BLD: 7.32 K/UL — SIGNIFICANT CHANGE UP (ref 4.8–10.8)
WBC # BLD: 7.41 K/UL — SIGNIFICANT CHANGE UP (ref 4.8–10.8)
WBC # BLD: 7.77 K/UL — SIGNIFICANT CHANGE UP (ref 4.8–10.8)
WBC # BLD: 8.18 K/UL — SIGNIFICANT CHANGE UP (ref 4.8–10.8)
WBC # BLD: 8.22 K/UL — SIGNIFICANT CHANGE UP (ref 4.8–10.8)
WBC # BLD: 8.33 K/UL — SIGNIFICANT CHANGE UP (ref 4.8–10.8)
WBC # BLD: 8.78 K/UL — SIGNIFICANT CHANGE UP (ref 4.8–10.8)
WBC # BLD: 9.34 K/UL — SIGNIFICANT CHANGE UP (ref 4.8–10.8)
WBC # FLD AUTO: 10.53 K/UL — SIGNIFICANT CHANGE UP (ref 4.8–10.8)
WBC # FLD AUTO: 13.45 K/UL — HIGH (ref 4.8–10.8)
WBC # FLD AUTO: 18.05 K/UL — HIGH (ref 4.8–10.8)
WBC # FLD AUTO: 20.37 K/UL — HIGH (ref 4.8–10.8)
WBC # FLD AUTO: 22.79 K/UL — HIGH (ref 4.8–10.8)
WBC # FLD AUTO: 24.18 K/UL — HIGH (ref 4.8–10.8)
WBC # FLD AUTO: 29.49 K/UL — HIGH (ref 4.8–10.8)
WBC # FLD AUTO: 35.31 K/UL — HIGH (ref 4.8–10.8)
WBC # FLD AUTO: 4.44 K/UL — LOW (ref 4.8–10.8)
WBC # FLD AUTO: 4.67 K/UL — LOW (ref 4.8–10.8)
WBC # FLD AUTO: 43.79 K/UL — CRITICAL HIGH (ref 4.8–10.8)
WBC # FLD AUTO: 49.59 K/UL — CRITICAL HIGH (ref 4.8–10.8)
WBC # FLD AUTO: 5 K/UL — SIGNIFICANT CHANGE UP (ref 4.8–10.8)
WBC # FLD AUTO: 5.3 K/UL — SIGNIFICANT CHANGE UP (ref 4.8–10.8)
WBC # FLD AUTO: 5.36 K/UL — SIGNIFICANT CHANGE UP (ref 4.8–10.8)
WBC # FLD AUTO: 5.51 K/UL — SIGNIFICANT CHANGE UP (ref 4.8–10.8)
WBC # FLD AUTO: 6.33 K/UL — SIGNIFICANT CHANGE UP (ref 4.8–10.8)
WBC # FLD AUTO: 6.58 K/UL — SIGNIFICANT CHANGE UP (ref 4.8–10.8)
WBC # FLD AUTO: 6.59 K/UL — SIGNIFICANT CHANGE UP (ref 4.8–10.8)
WBC # FLD AUTO: 6.87 K/UL — SIGNIFICANT CHANGE UP (ref 4.8–10.8)
WBC # FLD AUTO: 7.32 K/UL — SIGNIFICANT CHANGE UP (ref 4.8–10.8)
WBC # FLD AUTO: 7.41 K/UL — SIGNIFICANT CHANGE UP (ref 4.8–10.8)
WBC # FLD AUTO: 7.77 K/UL — SIGNIFICANT CHANGE UP (ref 4.8–10.8)
WBC # FLD AUTO: 8.18 K/UL — SIGNIFICANT CHANGE UP (ref 4.8–10.8)
WBC # FLD AUTO: 8.22 K/UL — SIGNIFICANT CHANGE UP (ref 4.8–10.8)
WBC # FLD AUTO: 8.33 K/UL — SIGNIFICANT CHANGE UP (ref 4.8–10.8)
WBC # FLD AUTO: 8.78 K/UL — SIGNIFICANT CHANGE UP (ref 4.8–10.8)
WBC # FLD AUTO: 9.34 K/UL — SIGNIFICANT CHANGE UP (ref 4.8–10.8)
WBC UR QL: 1 /HPF — SIGNIFICANT CHANGE UP (ref 0–5)
WBC UR QL: 10 /HPF — HIGH (ref 0–5)

## 2019-01-01 PROCEDURE — 99232 SBSQ HOSP IP/OBS MODERATE 35: CPT

## 2019-01-01 PROCEDURE — 93010 ELECTROCARDIOGRAM REPORT: CPT

## 2019-01-01 PROCEDURE — 71045 X-RAY EXAM CHEST 1 VIEW: CPT | Mod: 26

## 2019-01-01 PROCEDURE — 99231 SBSQ HOSP IP/OBS SF/LOW 25: CPT

## 2019-01-01 PROCEDURE — 99291 CRITICAL CARE FIRST HOUR: CPT

## 2019-01-01 PROCEDURE — 70450 CT HEAD/BRAIN W/O DYE: CPT | Mod: 26

## 2019-01-01 PROCEDURE — 99233 SBSQ HOSP IP/OBS HIGH 50: CPT

## 2019-01-01 PROCEDURE — 93000 ELECTROCARDIOGRAM COMPLETE: CPT

## 2019-01-01 PROCEDURE — 93971 EXTREMITY STUDY: CPT | Mod: 26,RT

## 2019-01-01 PROCEDURE — 93306 TTE W/DOPPLER COMPLETE: CPT | Mod: 26

## 2019-01-01 PROCEDURE — 74176 CT ABD & PELVIS W/O CONTRAST: CPT | Mod: 26

## 2019-01-01 PROCEDURE — 93971 EXTREMITY STUDY: CPT | Mod: 26,LT

## 2019-01-01 PROCEDURE — 99223 1ST HOSP IP/OBS HIGH 75: CPT | Mod: AI

## 2019-01-01 PROCEDURE — 93970 EXTREMITY STUDY: CPT | Mod: 26

## 2019-01-01 PROCEDURE — 76705 ECHO EXAM OF ABDOMEN: CPT | Mod: 26

## 2019-01-01 PROCEDURE — 99222 1ST HOSP IP/OBS MODERATE 55: CPT

## 2019-01-01 PROCEDURE — 76770 US EXAM ABDO BACK WALL COMP: CPT | Mod: 26

## 2019-01-01 PROCEDURE — 99239 HOSP IP/OBS DSCHRG MGMT >30: CPT

## 2019-01-01 PROCEDURE — 99213 OFFICE O/P EST LOW 20 MIN: CPT

## 2019-01-01 RX ORDER — METRONIDAZOLE 500 MG
500 TABLET ORAL ONCE
Refills: 0 | Status: COMPLETED | OUTPATIENT
Start: 2019-01-01 | End: 2019-01-01

## 2019-01-01 RX ORDER — VANCOMYCIN HCL 1 G
125 VIAL (EA) INTRAVENOUS EVERY 6 HOURS
Refills: 0 | Status: DISCONTINUED | OUTPATIENT
Start: 2019-01-01 | End: 2019-01-01

## 2019-01-01 RX ORDER — DIGOXIN 250 MCG
0.25 TABLET ORAL ONCE
Refills: 0 | Status: COMPLETED | OUTPATIENT
Start: 2019-01-01 | End: 2019-01-01

## 2019-01-01 RX ORDER — RIVAROXABAN 15 MG-20MG
10 KIT ORAL DAILY
Refills: 0 | Status: DISCONTINUED | OUTPATIENT
Start: 2019-01-01 | End: 2019-01-01

## 2019-01-01 RX ORDER — MAGNESIUM OXIDE 400 MG ORAL TABLET 241.3 MG
400 TABLET ORAL
Refills: 0 | Status: COMPLETED | OUTPATIENT
Start: 2019-01-01 | End: 2019-01-01

## 2019-01-01 RX ORDER — RIVAROXABAN 15 MG-20MG
15 KIT ORAL
Refills: 0 | Status: DISCONTINUED | OUTPATIENT
Start: 2019-01-01 | End: 2019-01-01

## 2019-01-01 RX ORDER — DILTIAZEM HCL 120 MG
120 CAPSULE, EXT RELEASE 24 HR ORAL EVERY 24 HOURS
Refills: 0 | Status: DISCONTINUED | OUTPATIENT
Start: 2019-01-01 | End: 2019-01-01

## 2019-01-01 RX ORDER — ASPIRIN/CALCIUM CARB/MAGNESIUM 324 MG
162 TABLET ORAL ONCE
Refills: 0 | Status: COMPLETED | OUTPATIENT
Start: 2019-01-01 | End: 2019-01-01

## 2019-01-01 RX ORDER — RIVAROXABAN 15 MG-20MG
1 KIT ORAL
Qty: 30 | Refills: 0
Start: 2019-01-01 | End: 2020-01-01

## 2019-01-01 RX ORDER — FUROSEMIDE 40 MG
40 TABLET ORAL ONCE
Refills: 0 | Status: COMPLETED | OUTPATIENT
Start: 2019-01-01 | End: 2019-01-01

## 2019-01-01 RX ORDER — MAGNESIUM SULFATE 500 MG/ML
2 VIAL (ML) INJECTION ONCE
Refills: 0 | Status: COMPLETED | OUTPATIENT
Start: 2019-01-01 | End: 2019-01-01

## 2019-01-01 RX ORDER — VANCOMYCIN HCL 1 G
1 VIAL (EA) INTRAVENOUS
Qty: 98 | Refills: 0
Start: 2019-01-01 | End: 2020-01-01

## 2019-01-01 RX ORDER — RIVAROXABAN 15 MG-20MG
1 KIT ORAL
Qty: 0 | Refills: 0 | DISCHARGE
Start: 2019-01-01

## 2019-01-01 RX ORDER — DIGOXIN 250 MCG
0.12 TABLET ORAL DAILY
Refills: 0 | Status: DISCONTINUED | OUTPATIENT
Start: 2019-01-01 | End: 2019-01-01

## 2019-01-01 RX ORDER — FIDAXOMICIN 200 MG/5ML
1 GRANULE, FOR SUSPENSION ORAL
Qty: 5 | Refills: 0
Start: 2019-01-01 | End: 2019-01-01

## 2019-01-01 RX ORDER — CIPROFLOXACIN LACTATE 400MG/40ML
VIAL (ML) INTRAVENOUS
Refills: 0 | Status: COMPLETED | OUTPATIENT
Start: 2019-01-01 | End: 2019-01-01

## 2019-01-01 RX ORDER — FUROSEMIDE 40 MG
20 TABLET ORAL DAILY
Refills: 0 | Status: DISCONTINUED | OUTPATIENT
Start: 2019-01-01 | End: 2019-01-01

## 2019-01-01 RX ORDER — LISINOPRIL 2.5 MG/1
1 TABLET ORAL
Qty: 0 | Refills: 0 | DISCHARGE

## 2019-01-01 RX ORDER — MAGNESIUM SULFATE 500 MG/ML
1 VIAL (ML) INJECTION ONCE
Refills: 0 | Status: COMPLETED | OUTPATIENT
Start: 2019-01-01 | End: 2019-01-01

## 2019-01-01 RX ORDER — METOPROLOL TARTRATE 50 MG
1 TABLET ORAL
Qty: 0 | Refills: 0 | DISCHARGE

## 2019-01-01 RX ORDER — VANCOMYCIN HCL 1 G
1000 VIAL (EA) INTRAVENOUS ONCE
Refills: 0 | Status: COMPLETED | OUTPATIENT
Start: 2019-01-01 | End: 2019-01-01

## 2019-01-01 RX ORDER — HYDROCHLOROTHIAZIDE 12.5 MG/1
12.5 CAPSULE ORAL DAILY
Refills: 0 | Status: ACTIVE | COMMUNITY

## 2019-01-01 RX ORDER — NOREPINEPHRINE BITARTRATE/D5W 8 MG/250ML
0.25 PLASTIC BAG, INJECTION (ML) INTRAVENOUS
Qty: 8 | Refills: 0 | Status: DISCONTINUED | OUTPATIENT
Start: 2019-01-01 | End: 2019-01-01

## 2019-01-01 RX ORDER — SODIUM CHLORIDE 9 MG/ML
1850 INJECTION, SOLUTION INTRAVENOUS ONCE
Refills: 0 | Status: COMPLETED | OUTPATIENT
Start: 2019-01-01 | End: 2019-01-01

## 2019-01-01 RX ORDER — DILTIAZEM HCL 120 MG
120 CAPSULE, EXT RELEASE 24 HR ORAL DAILY
Refills: 0 | Status: DISCONTINUED | OUTPATIENT
Start: 2019-01-01 | End: 2019-01-01

## 2019-01-01 RX ORDER — CIPROFLOXACIN LACTATE 400MG/40ML
400 VIAL (ML) INTRAVENOUS DAILY
Refills: 0 | Status: DISCONTINUED | OUTPATIENT
Start: 2019-01-01 | End: 2019-01-01

## 2019-01-01 RX ORDER — SODIUM CHLORIDE 9 MG/ML
1000 INJECTION, SOLUTION INTRAVENOUS
Refills: 0 | Status: DISCONTINUED | OUTPATIENT
Start: 2019-01-01 | End: 2019-01-01

## 2019-01-01 RX ORDER — CIPROFLOXACIN LACTATE 400MG/40ML
400 VIAL (ML) INTRAVENOUS ONCE
Refills: 0 | Status: COMPLETED | OUTPATIENT
Start: 2019-01-01 | End: 2019-01-01

## 2019-01-01 RX ORDER — VANCOMYCIN HCL 1 G
1 VIAL (EA) INTRAVENOUS
Qty: 56 | Refills: 0
Start: 2019-01-01 | End: 2019-01-01

## 2019-01-01 RX ORDER — VANCOMYCIN HCL 1 G
1 VIAL (EA) INTRAVENOUS
Qty: 14 | Refills: 0
Start: 2019-01-01 | End: 2019-01-01

## 2019-01-01 RX ORDER — VANCOMYCIN HCL 1 G
1 VIAL (EA) INTRAVENOUS
Qty: 28 | Refills: 0
Start: 2019-01-01 | End: 2019-01-01

## 2019-01-01 RX ORDER — MULTIVIT-MIN/FERROUS GLUCONATE 9 MG/15 ML
1 LIQUID (ML) ORAL DAILY
Refills: 0 | Status: DISCONTINUED | OUTPATIENT
Start: 2019-01-01 | End: 2019-01-01

## 2019-01-01 RX ORDER — CIPROFLOXACIN LACTATE 400MG/40ML
200 VIAL (ML) INTRAVENOUS ONCE
Refills: 0 | Status: COMPLETED | OUTPATIENT
Start: 2019-01-01 | End: 2019-01-01

## 2019-01-01 RX ORDER — NYSTATIN CREAM 100000 [USP'U]/G
1 CREAM TOPICAL
Refills: 0 | Status: DISCONTINUED | OUTPATIENT
Start: 2019-01-01 | End: 2019-01-01

## 2019-01-01 RX ORDER — IPRATROPIUM/ALBUTEROL SULFATE 18-103MCG
3 AEROSOL WITH ADAPTER (GRAM) INHALATION EVERY 6 HOURS
Refills: 0 | Status: DISCONTINUED | OUTPATIENT
Start: 2019-01-01 | End: 2019-01-01

## 2019-01-01 RX ORDER — DILTIAZEM HCL 120 MG
30 CAPSULE, EXT RELEASE 24 HR ORAL
Refills: 0 | Status: COMPLETED | OUTPATIENT
Start: 2019-01-01 | End: 2019-01-01

## 2019-01-01 RX ORDER — CEPHALEXIN 500 MG/1
500 CAPSULE ORAL
Refills: 0 | Status: ACTIVE | COMMUNITY

## 2019-01-01 RX ORDER — LISINOPRIL 2.5 MG/1
1 TABLET ORAL
Qty: 30 | Refills: 0
Start: 2019-01-01 | End: 2019-01-01

## 2019-01-01 RX ORDER — MEGESTROL ACETATE 40 MG/ML
400 SUSPENSION ORAL DAILY
Refills: 0 | Status: DISCONTINUED | OUTPATIENT
Start: 2019-01-01 | End: 2019-01-01

## 2019-01-01 RX ORDER — POTASSIUM CHLORIDE 20 MEQ
20 PACKET (EA) ORAL ONCE
Refills: 0 | Status: COMPLETED | OUTPATIENT
Start: 2019-01-01 | End: 2019-01-01

## 2019-01-01 RX ORDER — SODIUM BICARBONATE 1 MEQ/ML
325 SYRINGE (ML) INTRAVENOUS EVERY 8 HOURS
Refills: 0 | Status: DISCONTINUED | OUTPATIENT
Start: 2019-01-01 | End: 2019-01-01

## 2019-01-01 RX ORDER — RIVAROXABAN 15 MG-20MG
1 KIT ORAL
Qty: 0 | Refills: 0 | DISCHARGE

## 2019-01-01 RX ORDER — ALBUTEROL 90 UG/1
1 AEROSOL, METERED ORAL EVERY 4 HOURS
Refills: 0 | Status: DISCONTINUED | OUTPATIENT
Start: 2019-01-01 | End: 2019-01-01

## 2019-01-01 RX ORDER — CHLORHEXIDINE GLUCONATE 4 %
325 (65 FE) LIQUID (ML) TOPICAL
Refills: 0 | Status: ACTIVE | COMMUNITY

## 2019-01-01 RX ORDER — SODIUM CHLORIDE 9 MG/ML
1000 INJECTION, SOLUTION INTRAVENOUS ONCE
Refills: 0 | Status: COMPLETED | OUTPATIENT
Start: 2019-01-01 | End: 2019-01-01

## 2019-01-01 RX ORDER — METRONIDAZOLE 500 MG
500 TABLET ORAL EVERY 8 HOURS
Refills: 0 | Status: DISCONTINUED | OUTPATIENT
Start: 2019-01-01 | End: 2019-01-01

## 2019-01-01 RX ORDER — POTASSIUM CHLORIDE 20 MEQ
40 PACKET (EA) ORAL ONCE
Refills: 0 | Status: COMPLETED | OUTPATIENT
Start: 2019-01-01 | End: 2019-01-01

## 2019-01-01 RX ORDER — MAGNESIUM SULFATE 500 MG/ML
1 VIAL (ML) INJECTION ONCE
Refills: 0 | Status: DISCONTINUED | OUTPATIENT
Start: 2019-01-01 | End: 2019-01-01

## 2019-01-01 RX ORDER — DILTIAZEM HCL 120 MG
5 CAPSULE, EXT RELEASE 24 HR ORAL
Qty: 125 | Refills: 0 | Status: DISCONTINUED | OUTPATIENT
Start: 2019-01-01 | End: 2019-01-01

## 2019-01-01 RX ORDER — IOHEXOL 300 MG/ML
30 INJECTION, SOLUTION INTRAVENOUS ONCE
Refills: 0 | Status: COMPLETED | OUTPATIENT
Start: 2019-01-01 | End: 2019-01-01

## 2019-01-01 RX ORDER — FIDAXOMICIN 200 MG/5ML
1 GRANULE, FOR SUSPENSION ORAL
Qty: 0 | Refills: 0 | DISCHARGE
Start: 2019-01-01

## 2019-01-01 RX ORDER — LISINOPRIL 2.5 MG/1
40 TABLET ORAL DAILY
Refills: 0 | Status: DISCONTINUED | OUTPATIENT
Start: 2019-01-01 | End: 2019-01-01

## 2019-01-01 RX ORDER — CEFTRIAXONE 500 MG/1
1000 INJECTION, POWDER, FOR SOLUTION INTRAMUSCULAR; INTRAVENOUS EVERY 24 HOURS
Refills: 0 | Status: DISCONTINUED | OUTPATIENT
Start: 2019-01-01 | End: 2019-01-01

## 2019-01-01 RX ORDER — RIVAROXABAN 10 MG/1
10 TABLET, FILM COATED ORAL DAILY
Refills: 0 | Status: ACTIVE | COMMUNITY

## 2019-01-01 RX ORDER — MAGNESIUM OXIDE 400 MG ORAL TABLET 241.3 MG
400 TABLET ORAL
Refills: 0 | Status: DISCONTINUED | OUTPATIENT
Start: 2019-01-01 | End: 2019-01-01

## 2019-01-01 RX ORDER — DILTIAZEM HCL 120 MG
10 CAPSULE, EXT RELEASE 24 HR ORAL ONCE
Refills: 0 | Status: COMPLETED | OUTPATIENT
Start: 2019-01-01 | End: 2019-01-01

## 2019-01-01 RX ORDER — FIDAXOMICIN 200 MG/5ML
200 GRANULE, FOR SUSPENSION ORAL
Refills: 0 | Status: DISCONTINUED | OUTPATIENT
Start: 2019-01-01 | End: 2019-01-01

## 2019-01-01 RX ORDER — MULTIVIT-MIN/FERROUS GLUCONATE 9 MG/15 ML
1 LIQUID (ML) ORAL
Qty: 0 | Refills: 0 | DISCHARGE
Start: 2019-01-01

## 2019-01-01 RX ORDER — CIPROFLOXACIN LACTATE 400MG/40ML
400 VIAL (ML) INTRAVENOUS EVERY 12 HOURS
Refills: 0 | Status: DISCONTINUED | OUTPATIENT
Start: 2019-01-01 | End: 2019-01-01

## 2019-01-01 RX ORDER — LISINOPRIL 20 MG/1
20 TABLET ORAL DAILY
Refills: 0 | Status: ACTIVE | COMMUNITY

## 2019-01-01 RX ORDER — METOPROLOL TARTRATE 50 MG
50 TABLET ORAL
Refills: 0 | Status: DISCONTINUED | OUTPATIENT
Start: 2019-01-01 | End: 2019-01-01

## 2019-01-01 RX ORDER — DILTIAZEM HCL 120 MG
30 CAPSULE, EXT RELEASE 24 HR ORAL EVERY 6 HOURS
Refills: 0 | Status: DISCONTINUED | OUTPATIENT
Start: 2019-01-01 | End: 2019-01-01

## 2019-01-01 RX ORDER — HEPARIN SODIUM 5000 [USP'U]/ML
5000 INJECTION INTRAVENOUS; SUBCUTANEOUS EVERY 12 HOURS
Refills: 0 | Status: DISCONTINUED | OUTPATIENT
Start: 2019-01-01 | End: 2019-01-01

## 2019-01-01 RX ORDER — CEPHALEXIN 500 MG
1 CAPSULE ORAL
Qty: 18 | Refills: 0
Start: 2019-01-01 | End: 2019-01-01

## 2019-01-01 RX ORDER — LISINOPRIL 2.5 MG/1
1 TABLET ORAL
Qty: 0 | Refills: 0 | DISCHARGE
Start: 2019-01-01

## 2019-01-01 RX ORDER — POTASSIUM CHLORIDE 20 MEQ
20 PACKET (EA) ORAL
Refills: 0 | Status: COMPLETED | OUTPATIENT
Start: 2019-01-01 | End: 2019-01-01

## 2019-01-01 RX ORDER — FUROSEMIDE 40 MG
1 TABLET ORAL
Qty: 0 | Refills: 0 | DISCHARGE
Start: 2019-01-01

## 2019-01-01 RX ORDER — AZTREONAM 2 G
2000 VIAL (EA) INJECTION ONCE
Refills: 0 | Status: COMPLETED | OUTPATIENT
Start: 2019-01-01 | End: 2019-01-01

## 2019-01-01 RX ORDER — HYDROCHLOROTHIAZIDE 25 MG
12.5 TABLET ORAL DAILY
Refills: 0 | Status: DISCONTINUED | OUTPATIENT
Start: 2019-01-01 | End: 2019-01-01

## 2019-01-01 RX ORDER — TIOTROPIUM BROMIDE 18 UG/1
1 CAPSULE ORAL; RESPIRATORY (INHALATION) DAILY
Refills: 0 | Status: DISCONTINUED | OUTPATIENT
Start: 2019-01-01 | End: 2019-01-01

## 2019-01-01 RX ORDER — RIVAROXABAN 15 MG-20MG
1 KIT ORAL
Qty: 30 | Refills: 0
Start: 2019-01-01 | End: 2019-01-01

## 2019-01-01 RX ORDER — DILTIAZEM HCL 120 MG
1 CAPSULE, EXT RELEASE 24 HR ORAL
Qty: 30 | Refills: 0
Start: 2019-01-01 | End: 2019-01-01

## 2019-01-01 RX ORDER — SODIUM CHLORIDE 9 MG/ML
250 INJECTION INTRAMUSCULAR; INTRAVENOUS; SUBCUTANEOUS
Refills: 0 | Status: COMPLETED | OUTPATIENT
Start: 2019-01-01 | End: 2019-01-01

## 2019-01-01 RX ORDER — RIVAROXABAN 15 MG-20MG
1 KIT ORAL
Qty: 14 | Refills: 0
Start: 2019-01-01 | End: 2019-01-01

## 2019-01-01 RX ORDER — HEPARIN SODIUM 5000 [USP'U]/ML
5000 INJECTION INTRAVENOUS; SUBCUTANEOUS EVERY 8 HOURS
Refills: 0 | Status: DISCONTINUED | OUTPATIENT
Start: 2019-01-01 | End: 2019-01-01

## 2019-01-01 RX ORDER — METRONIDAZOLE 500 MG
500 TABLET ORAL ONCE
Refills: 0 | Status: DISCONTINUED | OUTPATIENT
Start: 2019-01-01 | End: 2019-01-01

## 2019-01-01 RX ORDER — RIVAROXABAN 15 MG-20MG
20 KIT ORAL
Refills: 0 | Status: DISCONTINUED | OUTPATIENT
Start: 2019-01-01 | End: 2019-01-01

## 2019-01-01 RX ORDER — DILTIAZEM HCL 120 MG
1 CAPSULE, EXT RELEASE 24 HR ORAL
Qty: 0 | Refills: 0 | DISCHARGE
Start: 2019-01-01

## 2019-01-01 RX ORDER — METOPROLOL TARTRATE 50 MG/1
50 TABLET, FILM COATED ORAL
Refills: 0 | Status: ACTIVE | COMMUNITY

## 2019-01-01 RX ORDER — CEPHALEXIN 500 MG
500 CAPSULE ORAL EVERY 12 HOURS
Refills: 0 | Status: DISCONTINUED | OUTPATIENT
Start: 2019-01-01 | End: 2019-01-01

## 2019-01-01 RX ORDER — SODIUM CHLORIDE 9 MG/ML
1000 INJECTION INTRAMUSCULAR; INTRAVENOUS; SUBCUTANEOUS
Refills: 0 | Status: DISCONTINUED | OUTPATIENT
Start: 2019-01-01 | End: 2019-01-01

## 2019-01-01 RX ORDER — RIVAROXABAN 15 MG-20MG
10 KIT ORAL
Refills: 0 | Status: DISCONTINUED | OUTPATIENT
Start: 2019-01-01 | End: 2019-01-01

## 2019-01-01 RX ORDER — METOPROLOL TARTRATE 50 MG
1 TABLET ORAL
Qty: 60 | Refills: 0
Start: 2019-01-01 | End: 2019-01-01

## 2019-01-01 RX ORDER — SODIUM BICARBONATE 1 MEQ/ML
0.45 SYRINGE (ML) INTRAVENOUS
Qty: 150 | Refills: 0 | Status: DISCONTINUED | OUTPATIENT
Start: 2019-01-01 | End: 2019-01-01

## 2019-01-01 RX ORDER — CHLORHEXIDINE GLUCONATE 213 G/1000ML
1 SOLUTION TOPICAL
Refills: 0 | Status: DISCONTINUED | OUTPATIENT
Start: 2019-01-01 | End: 2019-01-01

## 2019-01-01 RX ORDER — CIPROFLOXACIN LACTATE 400MG/40ML
250 VIAL (ML) INTRAVENOUS EVERY 12 HOURS
Refills: 0 | Status: DISCONTINUED | OUTPATIENT
Start: 2019-01-01 | End: 2019-01-01

## 2019-01-01 RX ORDER — MORPHINE SULFATE 50 MG/1
1 CAPSULE, EXTENDED RELEASE ORAL EVERY 4 HOURS
Refills: 0 | Status: DISCONTINUED | OUTPATIENT
Start: 2019-01-01 | End: 2019-01-01

## 2019-01-01 RX ORDER — VANCOMYCIN HCL 1 G
1 VIAL (EA) INTRAVENOUS
Qty: 28 | Refills: 0
Start: 2019-01-01 | End: 2020-01-01

## 2019-01-01 RX ORDER — DILTIAZEM HCL 120 MG
120 CAPSULE, EXT RELEASE 24 HR ORAL DAILY
Refills: 0 | Status: ACTIVE | COMMUNITY

## 2019-01-01 RX ORDER — DILTIAZEM HCL 120 MG
180 CAPSULE, EXT RELEASE 24 HR ORAL DAILY
Refills: 0 | Status: DISCONTINUED | OUTPATIENT
Start: 2019-01-01 | End: 2019-01-01

## 2019-01-01 RX ORDER — DIGOXIN 250 MCG
1 TABLET ORAL
Qty: 0 | Refills: 0 | DISCHARGE
Start: 2019-01-01

## 2019-01-01 RX ORDER — CHLORHEXIDINE GLUCONATE 213 G/1000ML
1 SOLUTION TOPICAL DAILY
Refills: 0 | Status: DISCONTINUED | OUTPATIENT
Start: 2019-01-01 | End: 2019-01-01

## 2019-01-01 RX ORDER — NOREPINEPHRINE BITARTRATE/D5W 8 MG/250ML
0.5 PLASTIC BAG, INJECTION (ML) INTRAVENOUS
Qty: 8 | Refills: 0 | Status: DISCONTINUED | OUTPATIENT
Start: 2019-01-01 | End: 2019-01-01

## 2019-01-01 RX ORDER — NOREPINEPHRINE BITARTRATE/D5W 8 MG/250ML
0.05 PLASTIC BAG, INJECTION (ML) INTRAVENOUS
Qty: 8 | Refills: 0 | Status: DISCONTINUED | OUTPATIENT
Start: 2019-01-01 | End: 2019-01-01

## 2019-01-01 RX ORDER — MEROPENEM 1 G/30ML
500 INJECTION INTRAVENOUS EVERY 12 HOURS
Refills: 0 | Status: DISCONTINUED | OUTPATIENT
Start: 2019-01-01 | End: 2019-01-01

## 2019-01-01 RX ORDER — CIPROFLOXACIN LACTATE 400MG/40ML
250 VIAL (ML) INTRAVENOUS DAILY
Refills: 0 | Status: DISCONTINUED | OUTPATIENT
Start: 2019-01-01 | End: 2019-01-01

## 2019-01-01 RX ORDER — SODIUM CHLORIDE 9 MG/ML
500 INJECTION INTRAMUSCULAR; INTRAVENOUS; SUBCUTANEOUS ONCE
Refills: 0 | Status: COMPLETED | OUTPATIENT
Start: 2019-01-01 | End: 2019-01-01

## 2019-01-01 RX ADMIN — Medication 125 MILLIGRAM(S): at 06:22

## 2019-01-01 RX ADMIN — Medication 150 MEQ/KG/HR: at 20:54

## 2019-01-01 RX ADMIN — NYSTATIN CREAM 1 APPLICATION(S): 100000 CREAM TOPICAL at 18:37

## 2019-01-01 RX ADMIN — Medication 0.12 MILLIGRAM(S): at 05:59

## 2019-01-01 RX ADMIN — Medication 50 GRAM(S): at 22:57

## 2019-01-01 RX ADMIN — Medication 125 MILLIGRAM(S): at 00:45

## 2019-01-01 RX ADMIN — Medication 120 MILLIGRAM(S): at 11:35

## 2019-01-01 RX ADMIN — Medication 30 MILLIGRAM(S): at 21:54

## 2019-01-01 RX ADMIN — Medication 125 MILLIGRAM(S): at 17:55

## 2019-01-01 RX ADMIN — Medication 3 MILLILITER(S): at 09:42

## 2019-01-01 RX ADMIN — Medication 125 MILLIGRAM(S): at 12:38

## 2019-01-01 RX ADMIN — CHLORHEXIDINE GLUCONATE 1 APPLICATION(S): 213 SOLUTION TOPICAL at 11:42

## 2019-01-01 RX ADMIN — Medication 30 MILLIGRAM(S): at 23:30

## 2019-01-01 RX ADMIN — Medication 30 MILLIGRAM(S): at 17:34

## 2019-01-01 RX ADMIN — Medication 30 MILLIGRAM(S): at 17:21

## 2019-01-01 RX ADMIN — NYSTATIN CREAM 1 APPLICATION(S): 100000 CREAM TOPICAL at 06:25

## 2019-01-01 RX ADMIN — RIVAROXABAN 15 MILLIGRAM(S): KIT at 18:06

## 2019-01-01 RX ADMIN — FIDAXOMICIN 200 MILLIGRAM(S): 200 GRANULE, FOR SUSPENSION ORAL at 06:11

## 2019-01-01 RX ADMIN — Medication 20 MILLIGRAM(S): at 18:06

## 2019-01-01 RX ADMIN — Medication 30 MILLIGRAM(S): at 06:00

## 2019-01-01 RX ADMIN — NYSTATIN CREAM 1 APPLICATION(S): 100000 CREAM TOPICAL at 18:00

## 2019-01-01 RX ADMIN — Medication 100 GRAM(S): at 18:10

## 2019-01-01 RX ADMIN — Medication 125 MILLIGRAM(S): at 06:27

## 2019-01-01 RX ADMIN — NYSTATIN CREAM 1 APPLICATION(S): 100000 CREAM TOPICAL at 05:27

## 2019-01-01 RX ADMIN — Medication 50 MILLIEQUIVALENT(S): at 22:59

## 2019-01-01 RX ADMIN — Medication 40 MILLIGRAM(S): at 13:01

## 2019-01-01 RX ADMIN — Medication 125 MILLIGRAM(S): at 06:04

## 2019-01-01 RX ADMIN — Medication 120 MILLIGRAM(S): at 06:25

## 2019-01-01 RX ADMIN — Medication 30 MILLIGRAM(S): at 12:56

## 2019-01-01 RX ADMIN — SODIUM CHLORIDE 75 MILLILITER(S): 9 INJECTION INTRAMUSCULAR; INTRAVENOUS; SUBCUTANEOUS at 07:04

## 2019-01-01 RX ADMIN — CHLORHEXIDINE GLUCONATE 1 APPLICATION(S): 213 SOLUTION TOPICAL at 05:42

## 2019-01-01 RX ADMIN — FIDAXOMICIN 200 MILLIGRAM(S): 200 GRANULE, FOR SUSPENSION ORAL at 18:04

## 2019-01-01 RX ADMIN — Medication 125 MILLIGRAM(S): at 05:51

## 2019-01-01 RX ADMIN — Medication 125 MILLIGRAM(S): at 17:27

## 2019-01-01 RX ADMIN — Medication 125 MILLIGRAM(S): at 01:57

## 2019-01-01 RX ADMIN — Medication 125 MILLIGRAM(S): at 11:50

## 2019-01-01 RX ADMIN — Medication 50 MILLIEQUIVALENT(S): at 10:10

## 2019-01-01 RX ADMIN — Medication 0.12 MILLIGRAM(S): at 05:27

## 2019-01-01 RX ADMIN — NYSTATIN CREAM 1 APPLICATION(S): 100000 CREAM TOPICAL at 17:29

## 2019-01-01 RX ADMIN — Medication 125 MILLIGRAM(S): at 11:28

## 2019-01-01 RX ADMIN — NYSTATIN CREAM 1 APPLICATION(S): 100000 CREAM TOPICAL at 06:11

## 2019-01-01 RX ADMIN — NYSTATIN CREAM 1 APPLICATION(S): 100000 CREAM TOPICAL at 17:21

## 2019-01-01 RX ADMIN — Medication 30 MILLIGRAM(S): at 11:04

## 2019-01-01 RX ADMIN — NYSTATIN CREAM 1 APPLICATION(S): 100000 CREAM TOPICAL at 06:10

## 2019-01-01 RX ADMIN — Medication 30 MILLIGRAM(S): at 14:06

## 2019-01-01 RX ADMIN — Medication 30 MILLIGRAM(S): at 21:42

## 2019-01-01 RX ADMIN — Medication 30 MILLIGRAM(S): at 05:27

## 2019-01-01 RX ADMIN — RIVAROXABAN 15 MILLIGRAM(S): KIT at 17:56

## 2019-01-01 RX ADMIN — Medication 200 MILLIGRAM(S): at 11:11

## 2019-01-01 RX ADMIN — RIVAROXABAN 10 MILLIGRAM(S): KIT at 11:01

## 2019-01-01 RX ADMIN — Medication 4.69 MICROGRAM(S)/KG/MIN: at 09:00

## 2019-01-01 RX ADMIN — CHLORHEXIDINE GLUCONATE 1 APPLICATION(S): 213 SOLUTION TOPICAL at 12:38

## 2019-01-01 RX ADMIN — RIVAROXABAN 15 MILLIGRAM(S): KIT at 17:54

## 2019-01-01 RX ADMIN — Medication 30 MILLIGRAM(S): at 23:39

## 2019-01-01 RX ADMIN — Medication 30 MILLIGRAM(S): at 18:12

## 2019-01-01 RX ADMIN — Medication 46.88 MICROGRAM(S)/KG/MIN: at 13:57

## 2019-01-01 RX ADMIN — CHLORHEXIDINE GLUCONATE 1 APPLICATION(S): 213 SOLUTION TOPICAL at 15:06

## 2019-01-01 RX ADMIN — RIVAROXABAN 15 MILLIGRAM(S): KIT at 18:12

## 2019-01-01 RX ADMIN — Medication 0.12 MILLIGRAM(S): at 05:55

## 2019-01-01 RX ADMIN — SODIUM CHLORIDE 100 MILLILITER(S): 9 INJECTION, SOLUTION INTRAVENOUS at 22:25

## 2019-01-01 RX ADMIN — Medication 30 MILLIGRAM(S): at 05:55

## 2019-01-01 RX ADMIN — Medication 30 MILLIGRAM(S): at 06:43

## 2019-01-01 RX ADMIN — RIVAROXABAN 15 MILLIGRAM(S): KIT at 18:36

## 2019-01-01 RX ADMIN — Medication 100 MILLIGRAM(S): at 05:48

## 2019-01-01 RX ADMIN — Medication 30 MILLIGRAM(S): at 17:09

## 2019-01-01 RX ADMIN — NYSTATIN CREAM 1 APPLICATION(S): 100000 CREAM TOPICAL at 06:44

## 2019-01-01 RX ADMIN — MAGNESIUM OXIDE 400 MG ORAL TABLET 400 MILLIGRAM(S): 241.3 TABLET ORAL at 08:34

## 2019-01-01 RX ADMIN — Medication 20 MILLIGRAM(S): at 05:32

## 2019-01-01 RX ADMIN — HEPARIN SODIUM 5000 UNIT(S): 5000 INJECTION INTRAVENOUS; SUBCUTANEOUS at 18:10

## 2019-01-01 RX ADMIN — Medication 30 MILLIGRAM(S): at 17:26

## 2019-01-01 RX ADMIN — RIVAROXABAN 10 MILLIGRAM(S): KIT at 17:07

## 2019-01-01 RX ADMIN — Medication 0.12 MILLIGRAM(S): at 05:54

## 2019-01-01 RX ADMIN — Medication 20 MILLIGRAM(S): at 06:25

## 2019-01-01 RX ADMIN — NYSTATIN CREAM 1 APPLICATION(S): 100000 CREAM TOPICAL at 17:30

## 2019-01-01 RX ADMIN — Medication 0.12 MILLIGRAM(S): at 05:53

## 2019-01-01 RX ADMIN — Medication 30 MILLIGRAM(S): at 23:21

## 2019-01-01 RX ADMIN — MAGNESIUM OXIDE 400 MG ORAL TABLET 400 MILLIGRAM(S): 241.3 TABLET ORAL at 08:16

## 2019-01-01 RX ADMIN — Medication 100 MILLIGRAM(S): at 17:05

## 2019-01-01 RX ADMIN — Medication 0.12 MILLIGRAM(S): at 06:11

## 2019-01-01 RX ADMIN — Medication 125 MILLIGRAM(S): at 05:49

## 2019-01-01 RX ADMIN — Medication 125 MILLIGRAM(S): at 21:42

## 2019-01-01 RX ADMIN — SODIUM CHLORIDE 1000 MILLILITER(S): 9 INJECTION INTRAMUSCULAR; INTRAVENOUS; SUBCUTANEOUS at 11:42

## 2019-01-01 RX ADMIN — HEPARIN SODIUM 5000 UNIT(S): 5000 INJECTION INTRAVENOUS; SUBCUTANEOUS at 05:51

## 2019-01-01 RX ADMIN — SODIUM CHLORIDE 75 MILLILITER(S): 9 INJECTION, SOLUTION INTRAVENOUS at 06:04

## 2019-01-01 RX ADMIN — Medication 50 MILLIGRAM(S): at 17:13

## 2019-01-01 RX ADMIN — CHLORHEXIDINE GLUCONATE 1 APPLICATION(S): 213 SOLUTION TOPICAL at 17:20

## 2019-01-01 RX ADMIN — Medication 1 TABLET(S): at 11:24

## 2019-01-01 RX ADMIN — Medication 125 MILLIGRAM(S): at 06:12

## 2019-01-01 RX ADMIN — Medication 1 TABLET(S): at 12:45

## 2019-01-01 RX ADMIN — Medication 125 MILLIGRAM(S): at 11:34

## 2019-01-01 RX ADMIN — RIVAROXABAN 15 MILLIGRAM(S): KIT at 18:09

## 2019-01-01 RX ADMIN — Medication 30 MILLIGRAM(S): at 04:43

## 2019-01-01 RX ADMIN — SODIUM CHLORIDE 75 MILLILITER(S): 9 INJECTION, SOLUTION INTRAVENOUS at 06:21

## 2019-01-01 RX ADMIN — MEROPENEM 100 MILLIGRAM(S): 1 INJECTION INTRAVENOUS at 17:10

## 2019-01-01 RX ADMIN — Medication 0.12 MILLIGRAM(S): at 06:07

## 2019-01-01 RX ADMIN — MAGNESIUM OXIDE 400 MG ORAL TABLET 400 MILLIGRAM(S): 241.3 TABLET ORAL at 18:36

## 2019-01-01 RX ADMIN — RIVAROXABAN 10 MILLIGRAM(S): KIT at 11:32

## 2019-01-01 RX ADMIN — SODIUM CHLORIDE 75 MILLILITER(S): 9 INJECTION, SOLUTION INTRAVENOUS at 03:24

## 2019-01-01 RX ADMIN — Medication 12.5 MILLIGRAM(S): at 10:24

## 2019-01-01 RX ADMIN — NYSTATIN CREAM 1 APPLICATION(S): 100000 CREAM TOPICAL at 18:09

## 2019-01-01 RX ADMIN — Medication 500 MILLIGRAM(S): at 17:04

## 2019-01-01 RX ADMIN — Medication 30 MILLIGRAM(S): at 11:14

## 2019-01-01 RX ADMIN — CHLORHEXIDINE GLUCONATE 1 APPLICATION(S): 213 SOLUTION TOPICAL at 12:29

## 2019-01-01 RX ADMIN — Medication 50 GRAM(S): at 17:41

## 2019-01-01 RX ADMIN — Medication 0.12 MILLIGRAM(S): at 05:51

## 2019-01-01 RX ADMIN — SODIUM CHLORIDE 75 MILLILITER(S): 9 INJECTION, SOLUTION INTRAVENOUS at 08:39

## 2019-01-01 RX ADMIN — Medication 125 MILLIGRAM(S): at 00:04

## 2019-01-01 RX ADMIN — Medication 120 MILLIGRAM(S): at 05:27

## 2019-01-01 RX ADMIN — NYSTATIN CREAM 1 APPLICATION(S): 100000 CREAM TOPICAL at 05:12

## 2019-01-01 RX ADMIN — Medication 120 MILLIGRAM(S): at 11:40

## 2019-01-01 RX ADMIN — Medication 30 MILLIGRAM(S): at 12:32

## 2019-01-01 RX ADMIN — RIVAROXABAN 10 MILLIGRAM(S): KIT at 17:58

## 2019-01-01 RX ADMIN — Medication 125 MILLIGRAM(S): at 17:52

## 2019-01-01 RX ADMIN — Medication 162 MILLIGRAM(S): at 17:12

## 2019-01-01 RX ADMIN — RIVAROXABAN 15 MILLIGRAM(S): KIT at 16:46

## 2019-01-01 RX ADMIN — FIDAXOMICIN 200 MILLIGRAM(S): 200 GRANULE, FOR SUSPENSION ORAL at 17:33

## 2019-01-01 RX ADMIN — Medication 50 MILLIGRAM(S): at 06:04

## 2019-01-01 RX ADMIN — Medication 100 MILLIGRAM(S): at 11:20

## 2019-01-01 RX ADMIN — Medication 150 MEQ/KG/HR: at 05:48

## 2019-01-01 RX ADMIN — Medication 1 TABLET(S): at 12:58

## 2019-01-01 RX ADMIN — Medication 125 MILLIGRAM(S): at 05:32

## 2019-01-01 RX ADMIN — SODIUM CHLORIDE 1000 MILLILITER(S): 9 INJECTION, SOLUTION INTRAVENOUS at 11:19

## 2019-01-01 RX ADMIN — Medication 1 TABLET(S): at 11:39

## 2019-01-01 RX ADMIN — Medication 3 MILLILITER(S): at 20:28

## 2019-01-01 RX ADMIN — Medication 30 MILLIGRAM(S): at 14:35

## 2019-01-01 RX ADMIN — RIVAROXABAN 15 MILLIGRAM(S): KIT at 08:14

## 2019-01-01 RX ADMIN — Medication 125 MILLIGRAM(S): at 17:26

## 2019-01-01 RX ADMIN — Medication 100 MILLIGRAM(S): at 22:37

## 2019-01-01 RX ADMIN — NYSTATIN CREAM 1 APPLICATION(S): 100000 CREAM TOPICAL at 17:56

## 2019-01-01 RX ADMIN — Medication 125 MILLIGRAM(S): at 06:00

## 2019-01-01 RX ADMIN — FIDAXOMICIN 200 MILLIGRAM(S): 200 GRANULE, FOR SUSPENSION ORAL at 18:33

## 2019-01-01 RX ADMIN — NYSTATIN CREAM 1 APPLICATION(S): 100000 CREAM TOPICAL at 17:27

## 2019-01-01 RX ADMIN — Medication 30 MILLIGRAM(S): at 17:04

## 2019-01-01 RX ADMIN — Medication 125 MILLIGRAM(S): at 23:48

## 2019-01-01 RX ADMIN — MAGNESIUM OXIDE 400 MG ORAL TABLET 400 MILLIGRAM(S): 241.3 TABLET ORAL at 18:33

## 2019-01-01 RX ADMIN — Medication 1 TABLET(S): at 11:38

## 2019-01-01 RX ADMIN — Medication 120 MILLIGRAM(S): at 06:06

## 2019-01-01 RX ADMIN — HEPARIN SODIUM 5000 UNIT(S): 5000 INJECTION INTRAVENOUS; SUBCUTANEOUS at 17:50

## 2019-01-01 RX ADMIN — SODIUM CHLORIDE 75 MILLILITER(S): 9 INJECTION, SOLUTION INTRAVENOUS at 11:39

## 2019-01-01 RX ADMIN — Medication 1 TABLET(S): at 12:06

## 2019-01-01 RX ADMIN — RIVAROXABAN 10 MILLIGRAM(S): KIT at 11:40

## 2019-01-01 RX ADMIN — Medication 30 MILLIGRAM(S): at 05:41

## 2019-01-01 RX ADMIN — CHLORHEXIDINE GLUCONATE 1 APPLICATION(S): 213 SOLUTION TOPICAL at 12:46

## 2019-01-01 RX ADMIN — Medication 1 TABLET(S): at 11:40

## 2019-01-01 RX ADMIN — NYSTATIN CREAM 1 APPLICATION(S): 100000 CREAM TOPICAL at 18:06

## 2019-01-01 RX ADMIN — Medication 30 MILLIGRAM(S): at 06:23

## 2019-01-01 RX ADMIN — SODIUM CHLORIDE 75 MILLILITER(S): 9 INJECTION, SOLUTION INTRAVENOUS at 14:05

## 2019-01-01 RX ADMIN — Medication 30 MILLIGRAM(S): at 11:32

## 2019-01-01 RX ADMIN — Medication 30 MILLIGRAM(S): at 05:08

## 2019-01-01 RX ADMIN — Medication 1 TABLET(S): at 12:05

## 2019-01-01 RX ADMIN — FIDAXOMICIN 200 MILLIGRAM(S): 200 GRANULE, FOR SUSPENSION ORAL at 06:07

## 2019-01-01 RX ADMIN — Medication 125 MILLIGRAM(S): at 13:26

## 2019-01-01 RX ADMIN — RIVAROXABAN 15 MILLIGRAM(S): KIT at 17:27

## 2019-01-01 RX ADMIN — Medication 30 MILLIGRAM(S): at 17:42

## 2019-01-01 RX ADMIN — FIDAXOMICIN 200 MILLIGRAM(S): 200 GRANULE, FOR SUSPENSION ORAL at 06:45

## 2019-01-01 RX ADMIN — Medication 20 MILLIGRAM(S): at 06:11

## 2019-01-01 RX ADMIN — Medication 30 MILLIGRAM(S): at 18:00

## 2019-01-01 RX ADMIN — NYSTATIN CREAM 1 APPLICATION(S): 100000 CREAM TOPICAL at 18:22

## 2019-01-01 RX ADMIN — CHLORHEXIDINE GLUCONATE 1 APPLICATION(S): 213 SOLUTION TOPICAL at 11:28

## 2019-01-01 RX ADMIN — Medication 100 MILLIGRAM(S): at 13:13

## 2019-01-01 RX ADMIN — RIVAROXABAN 15 MILLIGRAM(S): KIT at 17:28

## 2019-01-01 RX ADMIN — CHLORHEXIDINE GLUCONATE 1 APPLICATION(S): 213 SOLUTION TOPICAL at 11:24

## 2019-01-01 RX ADMIN — Medication 125 MILLIGRAM(S): at 14:05

## 2019-01-01 RX ADMIN — Medication 50 MILLIGRAM(S): at 05:28

## 2019-01-01 RX ADMIN — SODIUM CHLORIDE 75 MILLILITER(S): 9 INJECTION, SOLUTION INTRAVENOUS at 17:25

## 2019-01-01 RX ADMIN — Medication 0.12 MILLIGRAM(S): at 12:32

## 2019-01-01 RX ADMIN — Medication 1 TABLET(S): at 12:11

## 2019-01-01 RX ADMIN — SODIUM CHLORIDE 100 MILLILITER(S): 9 INJECTION, SOLUTION INTRAVENOUS at 10:43

## 2019-01-01 RX ADMIN — Medication 30 MILLIGRAM(S): at 18:09

## 2019-01-01 RX ADMIN — Medication 30 MILLIGRAM(S): at 06:07

## 2019-01-01 RX ADMIN — Medication 30 MILLIGRAM(S): at 11:40

## 2019-01-01 RX ADMIN — FIDAXOMICIN 200 MILLIGRAM(S): 200 GRANULE, FOR SUSPENSION ORAL at 17:42

## 2019-01-01 RX ADMIN — Medication 250 MILLIGRAM(S): at 12:12

## 2019-01-01 RX ADMIN — Medication 100 MILLIGRAM(S): at 13:28

## 2019-01-01 RX ADMIN — Medication 50 MILLIGRAM(S): at 17:04

## 2019-01-01 RX ADMIN — CHLORHEXIDINE GLUCONATE 1 APPLICATION(S): 213 SOLUTION TOPICAL at 11:39

## 2019-01-01 RX ADMIN — Medication 125 MILLIGRAM(S): at 06:01

## 2019-01-01 RX ADMIN — HEPARIN SODIUM 5000 UNIT(S): 5000 INJECTION INTRAVENOUS; SUBCUTANEOUS at 06:12

## 2019-01-01 RX ADMIN — Medication 125 MILLIGRAM(S): at 17:07

## 2019-01-01 RX ADMIN — FIDAXOMICIN 200 MILLIGRAM(S): 200 GRANULE, FOR SUSPENSION ORAL at 05:59

## 2019-01-01 RX ADMIN — FIDAXOMICIN 200 MILLIGRAM(S): 200 GRANULE, FOR SUSPENSION ORAL at 17:56

## 2019-01-01 RX ADMIN — SODIUM CHLORIDE 1000 MILLILITER(S): 9 INJECTION, SOLUTION INTRAVENOUS at 18:14

## 2019-01-01 RX ADMIN — SODIUM CHLORIDE 75 MILLILITER(S): 9 INJECTION, SOLUTION INTRAVENOUS at 05:32

## 2019-01-01 RX ADMIN — RIVAROXABAN 15 MILLIGRAM(S): KIT at 08:34

## 2019-01-01 RX ADMIN — NYSTATIN CREAM 1 APPLICATION(S): 100000 CREAM TOPICAL at 06:06

## 2019-01-01 RX ADMIN — RIVAROXABAN 15 MILLIGRAM(S): KIT at 17:34

## 2019-01-01 RX ADMIN — RIVAROXABAN 15 MILLIGRAM(S): KIT at 17:26

## 2019-01-01 RX ADMIN — RIVAROXABAN 15 MILLIGRAM(S): KIT at 08:48

## 2019-01-01 RX ADMIN — Medication 125 MILLIGRAM(S): at 05:59

## 2019-01-01 RX ADMIN — Medication 100 MILLIGRAM(S): at 13:24

## 2019-01-01 RX ADMIN — FIDAXOMICIN 200 MILLIGRAM(S): 200 GRANULE, FOR SUSPENSION ORAL at 17:43

## 2019-01-01 RX ADMIN — Medication 50 MILLIGRAM(S): at 05:08

## 2019-01-01 RX ADMIN — NYSTATIN CREAM 1 APPLICATION(S): 100000 CREAM TOPICAL at 06:02

## 2019-01-01 RX ADMIN — Medication 4.69 MICROGRAM(S)/KG/MIN: at 19:49

## 2019-01-01 RX ADMIN — Medication 30 MILLIGRAM(S): at 12:44

## 2019-01-01 RX ADMIN — MAGNESIUM OXIDE 400 MG ORAL TABLET 400 MILLIGRAM(S): 241.3 TABLET ORAL at 17:26

## 2019-01-01 RX ADMIN — Medication 120 MILLIGRAM(S): at 12:33

## 2019-01-01 RX ADMIN — RIVAROXABAN 15 MILLIGRAM(S): KIT at 08:16

## 2019-01-01 RX ADMIN — FIDAXOMICIN 200 MILLIGRAM(S): 200 GRANULE, FOR SUSPENSION ORAL at 06:43

## 2019-01-01 RX ADMIN — MAGNESIUM OXIDE 400 MG ORAL TABLET 400 MILLIGRAM(S): 241.3 TABLET ORAL at 18:04

## 2019-01-01 RX ADMIN — Medication 0.12 MILLIGRAM(S): at 06:29

## 2019-01-01 RX ADMIN — Medication 46.88 MICROGRAM(S)/KG/MIN: at 22:25

## 2019-01-01 RX ADMIN — Medication 0.12 MILLIGRAM(S): at 05:32

## 2019-01-01 RX ADMIN — Medication 100 MILLIGRAM(S): at 21:08

## 2019-01-01 RX ADMIN — Medication 325 MILLIGRAM(S): at 13:36

## 2019-01-01 RX ADMIN — Medication 125 MILLIGRAM(S): at 23:57

## 2019-01-01 RX ADMIN — Medication 125 MILLIGRAM(S): at 11:41

## 2019-01-01 RX ADMIN — Medication 125 MILLIGRAM(S): at 23:06

## 2019-01-01 RX ADMIN — SODIUM CHLORIDE 75 MILLILITER(S): 9 INJECTION, SOLUTION INTRAVENOUS at 00:39

## 2019-01-01 RX ADMIN — NYSTATIN CREAM 1 APPLICATION(S): 100000 CREAM TOPICAL at 17:42

## 2019-01-01 RX ADMIN — SODIUM CHLORIDE 1850 MILLILITER(S): 9 INJECTION, SOLUTION INTRAVENOUS at 16:16

## 2019-01-01 RX ADMIN — Medication 125 MILLIGRAM(S): at 17:29

## 2019-01-01 RX ADMIN — RIVAROXABAN 15 MILLIGRAM(S): KIT at 17:21

## 2019-01-01 RX ADMIN — RIVAROXABAN 15 MILLIGRAM(S): KIT at 08:09

## 2019-01-01 RX ADMIN — MEROPENEM 100 MILLIGRAM(S): 1 INJECTION INTRAVENOUS at 09:55

## 2019-01-01 RX ADMIN — Medication 125 MILLIGRAM(S): at 23:36

## 2019-01-01 RX ADMIN — Medication 120 MILLIGRAM(S): at 12:29

## 2019-01-01 RX ADMIN — FIDAXOMICIN 200 MILLIGRAM(S): 200 GRANULE, FOR SUSPENSION ORAL at 06:29

## 2019-01-01 RX ADMIN — HEPARIN SODIUM 5000 UNIT(S): 5000 INJECTION INTRAVENOUS; SUBCUTANEOUS at 06:23

## 2019-01-01 RX ADMIN — Medication 250 MILLIGRAM(S): at 11:31

## 2019-01-01 RX ADMIN — Medication 50 MILLIGRAM(S): at 17:09

## 2019-01-01 RX ADMIN — RIVAROXABAN 10 MILLIGRAM(S): KIT at 17:35

## 2019-01-01 RX ADMIN — Medication 30 MILLIGRAM(S): at 05:50

## 2019-01-01 RX ADMIN — CHLORHEXIDINE GLUCONATE 1 APPLICATION(S): 213 SOLUTION TOPICAL at 11:36

## 2019-01-01 RX ADMIN — NYSTATIN CREAM 1 APPLICATION(S): 100000 CREAM TOPICAL at 06:08

## 2019-01-01 RX ADMIN — Medication 325 MILLIGRAM(S): at 13:29

## 2019-01-01 RX ADMIN — Medication 30 MILLIGRAM(S): at 12:10

## 2019-01-01 RX ADMIN — MEGESTROL ACETATE 400 MILLIGRAM(S): 40 SUSPENSION ORAL at 12:29

## 2019-01-01 RX ADMIN — Medication 100 MILLIGRAM(S): at 23:07

## 2019-01-01 RX ADMIN — Medication 30 MILLIGRAM(S): at 23:57

## 2019-01-01 RX ADMIN — NYSTATIN CREAM 1 APPLICATION(S): 100000 CREAM TOPICAL at 05:59

## 2019-01-01 RX ADMIN — NYSTATIN CREAM 1 APPLICATION(S): 100000 CREAM TOPICAL at 06:29

## 2019-01-01 RX ADMIN — MEGESTROL ACETATE 400 MILLIGRAM(S): 40 SUSPENSION ORAL at 11:41

## 2019-01-01 RX ADMIN — Medication 0.12 MILLIGRAM(S): at 05:46

## 2019-01-01 RX ADMIN — NYSTATIN CREAM 1 APPLICATION(S): 100000 CREAM TOPICAL at 17:51

## 2019-01-01 RX ADMIN — Medication 10 MILLIGRAM(S): at 16:50

## 2019-01-01 RX ADMIN — SODIUM CHLORIDE 75 MILLILITER(S): 9 INJECTION, SOLUTION INTRAVENOUS at 00:41

## 2019-01-01 RX ADMIN — Medication 125 MILLIGRAM(S): at 18:09

## 2019-01-01 RX ADMIN — CHLORHEXIDINE GLUCONATE 1 APPLICATION(S): 213 SOLUTION TOPICAL at 11:35

## 2019-01-01 RX ADMIN — Medication 50 MILLIGRAM(S): at 17:56

## 2019-01-01 RX ADMIN — Medication 30 MILLIGRAM(S): at 17:18

## 2019-01-01 RX ADMIN — Medication 50 MILLIGRAM(S): at 15:43

## 2019-01-01 RX ADMIN — Medication 0.12 MILLIGRAM(S): at 06:06

## 2019-01-01 RX ADMIN — CHLORHEXIDINE GLUCONATE 1 APPLICATION(S): 213 SOLUTION TOPICAL at 13:59

## 2019-01-01 RX ADMIN — CHLORHEXIDINE GLUCONATE 1 APPLICATION(S): 213 SOLUTION TOPICAL at 12:03

## 2019-01-01 RX ADMIN — RIVAROXABAN 15 MILLIGRAM(S): KIT at 17:42

## 2019-01-01 RX ADMIN — Medication 25 GRAM(S): at 10:10

## 2019-01-01 RX ADMIN — FIDAXOMICIN 200 MILLIGRAM(S): 200 GRANULE, FOR SUSPENSION ORAL at 17:26

## 2019-01-01 RX ADMIN — Medication 50 MILLIEQUIVALENT(S): at 04:12

## 2019-01-01 RX ADMIN — MEGESTROL ACETATE 400 MILLIGRAM(S): 40 SUSPENSION ORAL at 19:03

## 2019-01-01 RX ADMIN — MEGESTROL ACETATE 400 MILLIGRAM(S): 40 SUSPENSION ORAL at 18:34

## 2019-01-01 RX ADMIN — Medication 5 MG/HR: at 16:50

## 2019-01-01 RX ADMIN — Medication 30 MILLIGRAM(S): at 00:45

## 2019-01-01 RX ADMIN — CHLORHEXIDINE GLUCONATE 1 APPLICATION(S): 213 SOLUTION TOPICAL at 11:34

## 2019-01-01 RX ADMIN — Medication 125 MILLIGRAM(S): at 17:41

## 2019-01-01 RX ADMIN — NYSTATIN CREAM 1 APPLICATION(S): 100000 CREAM TOPICAL at 18:13

## 2019-01-01 RX ADMIN — FIDAXOMICIN 200 MILLIGRAM(S): 200 GRANULE, FOR SUSPENSION ORAL at 17:30

## 2019-01-01 RX ADMIN — FIDAXOMICIN 200 MILLIGRAM(S): 200 GRANULE, FOR SUSPENSION ORAL at 05:54

## 2019-01-01 RX ADMIN — Medication 100 MILLIGRAM(S): at 14:00

## 2019-01-01 RX ADMIN — Medication 0.12 MILLIGRAM(S): at 06:00

## 2019-01-01 RX ADMIN — Medication 100 MILLIGRAM(S): at 22:03

## 2019-01-01 RX ADMIN — RIVAROXABAN 15 MILLIGRAM(S): KIT at 09:24

## 2019-01-01 RX ADMIN — Medication 120 MILLIGRAM(S): at 05:46

## 2019-01-01 RX ADMIN — Medication 3 MILLILITER(S): at 20:04

## 2019-01-01 RX ADMIN — CEFTRIAXONE 100 MILLIGRAM(S): 500 INJECTION, POWDER, FOR SOLUTION INTRAMUSCULAR; INTRAVENOUS at 21:24

## 2019-01-01 RX ADMIN — Medication 0.12 MILLIGRAM(S): at 06:25

## 2019-01-01 RX ADMIN — MEGESTROL ACETATE 400 MILLIGRAM(S): 40 SUSPENSION ORAL at 11:38

## 2019-01-01 RX ADMIN — Medication 50 MILLIGRAM(S): at 05:41

## 2019-01-01 RX ADMIN — SODIUM CHLORIDE 1000 MILLILITER(S): 9 INJECTION INTRAMUSCULAR; INTRAVENOUS; SUBCUTANEOUS at 04:38

## 2019-01-01 RX ADMIN — CHLORHEXIDINE GLUCONATE 1 APPLICATION(S): 213 SOLUTION TOPICAL at 11:17

## 2019-01-01 RX ADMIN — RIVAROXABAN 10 MILLIGRAM(S): KIT at 17:26

## 2019-01-01 RX ADMIN — NYSTATIN CREAM 1 APPLICATION(S): 100000 CREAM TOPICAL at 18:10

## 2019-01-01 RX ADMIN — CHLORHEXIDINE GLUCONATE 1 APPLICATION(S): 213 SOLUTION TOPICAL at 11:31

## 2019-01-01 RX ADMIN — Medication 100 MILLIGRAM(S): at 06:01

## 2019-01-01 RX ADMIN — Medication 1 TABLET(S): at 11:57

## 2019-01-01 RX ADMIN — Medication 125 MILLIGRAM(S): at 05:55

## 2019-01-01 RX ADMIN — Medication 100 MILLIGRAM(S): at 21:09

## 2019-01-01 RX ADMIN — Medication 100 MILLIGRAM(S): at 13:36

## 2019-01-01 RX ADMIN — NYSTATIN CREAM 1 APPLICATION(S): 100000 CREAM TOPICAL at 17:54

## 2019-01-01 RX ADMIN — NYSTATIN CREAM 1 APPLICATION(S): 100000 CREAM TOPICAL at 06:00

## 2019-01-01 RX ADMIN — CHLORHEXIDINE GLUCONATE 1 APPLICATION(S): 213 SOLUTION TOPICAL at 11:51

## 2019-01-01 RX ADMIN — Medication 120 MILLIGRAM(S): at 06:27

## 2019-01-01 RX ADMIN — MAGNESIUM OXIDE 400 MG ORAL TABLET 400 MILLIGRAM(S): 241.3 TABLET ORAL at 11:37

## 2019-01-01 RX ADMIN — FIDAXOMICIN 200 MILLIGRAM(S): 200 GRANULE, FOR SUSPENSION ORAL at 17:54

## 2019-01-01 RX ADMIN — CHLORHEXIDINE GLUCONATE 1 APPLICATION(S): 213 SOLUTION TOPICAL at 05:56

## 2019-01-01 RX ADMIN — Medication 125 MILLIGRAM(S): at 00:01

## 2019-01-01 RX ADMIN — NYSTATIN CREAM 1 APPLICATION(S): 100000 CREAM TOPICAL at 05:52

## 2019-01-01 RX ADMIN — RIVAROXABAN 10 MILLIGRAM(S): KIT at 17:29

## 2019-01-01 RX ADMIN — Medication 4.69 MICROGRAM(S)/KG/MIN: at 05:48

## 2019-01-01 RX ADMIN — Medication 125 MILLIGRAM(S): at 13:25

## 2019-01-01 RX ADMIN — Medication 100 MILLIGRAM(S): at 05:28

## 2019-01-01 RX ADMIN — Medication 20 MILLIGRAM(S): at 05:47

## 2019-01-01 RX ADMIN — FIDAXOMICIN 200 MILLIGRAM(S): 200 GRANULE, FOR SUSPENSION ORAL at 18:31

## 2019-01-01 RX ADMIN — Medication 40 MILLIGRAM(S): at 12:30

## 2019-01-01 RX ADMIN — Medication 50 GRAM(S): at 11:32

## 2019-01-01 RX ADMIN — MAGNESIUM OXIDE 400 MG ORAL TABLET 400 MILLIGRAM(S): 241.3 TABLET ORAL at 08:48

## 2019-01-01 RX ADMIN — Medication 30 MILLIGRAM(S): at 23:31

## 2019-01-01 RX ADMIN — MEGESTROL ACETATE 400 MILLIGRAM(S): 40 SUSPENSION ORAL at 11:37

## 2019-01-01 RX ADMIN — RIVAROXABAN 15 MILLIGRAM(S): KIT at 10:07

## 2019-01-01 RX ADMIN — RIVAROXABAN 10 MILLIGRAM(S): KIT at 17:22

## 2019-01-01 RX ADMIN — Medication 50 GRAM(S): at 17:27

## 2019-01-01 RX ADMIN — Medication 125 MILLIGRAM(S): at 17:35

## 2019-01-01 RX ADMIN — NYSTATIN CREAM 1 APPLICATION(S): 100000 CREAM TOPICAL at 05:54

## 2019-01-01 RX ADMIN — SODIUM CHLORIDE 75 MILLILITER(S): 9 INJECTION, SOLUTION INTRAVENOUS at 16:24

## 2019-01-01 RX ADMIN — SODIUM CHLORIDE 1000 MILLILITER(S): 9 INJECTION, SOLUTION INTRAVENOUS at 10:28

## 2019-01-01 RX ADMIN — Medication 125 MILLIGRAM(S): at 17:34

## 2019-01-01 RX ADMIN — Medication 200 MILLIGRAM(S): at 22:03

## 2019-01-01 RX ADMIN — RIVAROXABAN 15 MILLIGRAM(S): KIT at 10:13

## 2019-01-01 RX ADMIN — FIDAXOMICIN 200 MILLIGRAM(S): 200 GRANULE, FOR SUSPENSION ORAL at 05:46

## 2019-01-01 RX ADMIN — CHLORHEXIDINE GLUCONATE 1 APPLICATION(S): 213 SOLUTION TOPICAL at 14:36

## 2019-01-01 RX ADMIN — Medication 125 MILLIGRAM(S): at 17:18

## 2019-01-01 RX ADMIN — Medication 325 MILLIGRAM(S): at 06:11

## 2019-01-01 RX ADMIN — Medication 30 MILLIGRAM(S): at 00:01

## 2019-01-01 RX ADMIN — Medication 30 MILLIGRAM(S): at 00:24

## 2019-01-01 RX ADMIN — Medication 125 MILLIGRAM(S): at 11:40

## 2019-01-01 RX ADMIN — Medication 30 MILLIGRAM(S): at 09:54

## 2019-01-01 RX ADMIN — Medication 125 MILLIGRAM(S): at 12:11

## 2019-01-01 RX ADMIN — Medication 40 MILLIEQUIVALENT(S): at 10:11

## 2019-01-01 RX ADMIN — NYSTATIN CREAM 1 APPLICATION(S): 100000 CREAM TOPICAL at 17:26

## 2019-01-01 RX ADMIN — RIVAROXABAN 15 MILLIGRAM(S): KIT at 18:22

## 2019-01-01 RX ADMIN — Medication 100 MILLIGRAM(S): at 05:53

## 2019-01-01 RX ADMIN — Medication 30 MILLIGRAM(S): at 17:27

## 2019-01-01 RX ADMIN — CHLORHEXIDINE GLUCONATE 1 APPLICATION(S): 213 SOLUTION TOPICAL at 14:06

## 2019-01-01 RX ADMIN — Medication 30 MILLIGRAM(S): at 11:24

## 2019-01-01 RX ADMIN — NYSTATIN CREAM 1 APPLICATION(S): 100000 CREAM TOPICAL at 05:53

## 2019-01-01 RX ADMIN — Medication 125 MILLIGRAM(S): at 00:07

## 2019-01-01 RX ADMIN — HEPARIN SODIUM 5000 UNIT(S): 5000 INJECTION INTRAVENOUS; SUBCUTANEOUS at 06:59

## 2019-01-01 RX ADMIN — Medication 125 MILLIGRAM(S): at 06:25

## 2019-01-01 RX ADMIN — Medication 30 MILLIGRAM(S): at 17:49

## 2019-01-01 RX ADMIN — SODIUM CHLORIDE 75 MILLILITER(S): 9 INJECTION INTRAMUSCULAR; INTRAVENOUS; SUBCUTANEOUS at 17:54

## 2019-01-01 RX ADMIN — MAGNESIUM OXIDE 400 MG ORAL TABLET 400 MILLIGRAM(S): 241.3 TABLET ORAL at 11:38

## 2019-01-01 RX ADMIN — Medication 30 MILLIGRAM(S): at 05:59

## 2019-01-01 RX ADMIN — Medication 125 MILLIGRAM(S): at 23:31

## 2019-01-01 RX ADMIN — Medication 125 MILLIGRAM(S): at 12:58

## 2019-01-01 RX ADMIN — CHLORHEXIDINE GLUCONATE 1 APPLICATION(S): 213 SOLUTION TOPICAL at 12:58

## 2019-01-01 RX ADMIN — RIVAROXABAN 15 MILLIGRAM(S): KIT at 17:50

## 2019-01-01 RX ADMIN — Medication 125 MILLIGRAM(S): at 11:04

## 2019-01-01 RX ADMIN — FIDAXOMICIN 200 MILLIGRAM(S): 200 GRANULE, FOR SUSPENSION ORAL at 18:36

## 2019-01-01 RX ADMIN — HEPARIN SODIUM 5000 UNIT(S): 5000 INJECTION INTRAVENOUS; SUBCUTANEOUS at 22:03

## 2019-01-01 RX ADMIN — Medication 0.12 MILLIGRAM(S): at 06:21

## 2019-01-01 RX ADMIN — RIVAROXABAN 15 MILLIGRAM(S): KIT at 12:31

## 2019-01-01 RX ADMIN — Medication 30 MILLIGRAM(S): at 12:06

## 2019-01-01 RX ADMIN — Medication 30 MILLIGRAM(S): at 15:05

## 2019-01-01 RX ADMIN — RIVAROXABAN 15 MILLIGRAM(S): KIT at 18:04

## 2019-01-01 RX ADMIN — FIDAXOMICIN 200 MILLIGRAM(S): 200 GRANULE, FOR SUSPENSION ORAL at 05:53

## 2019-01-01 RX ADMIN — Medication 325 MILLIGRAM(S): at 22:37

## 2019-01-01 RX ADMIN — Medication 125 MILLIGRAM(S): at 17:22

## 2019-01-01 RX ADMIN — Medication 1 TABLET(S): at 12:57

## 2019-01-01 RX ADMIN — Medication 3 MILLILITER(S): at 09:40

## 2019-01-01 RX ADMIN — NYSTATIN CREAM 1 APPLICATION(S): 100000 CREAM TOPICAL at 06:07

## 2019-01-01 RX ADMIN — Medication 3 MILLILITER(S): at 14:51

## 2019-01-01 RX ADMIN — RIVAROXABAN 15 MILLIGRAM(S): KIT at 17:29

## 2019-01-01 RX ADMIN — Medication 1 TABLET(S): at 12:32

## 2019-01-01 RX ADMIN — Medication 30 MILLIGRAM(S): at 06:21

## 2019-01-01 RX ADMIN — Medication 125 MILLIGRAM(S): at 00:39

## 2019-01-01 RX ADMIN — MEROPENEM 100 MILLIGRAM(S): 1 INJECTION INTRAVENOUS at 05:28

## 2019-01-01 RX ADMIN — SODIUM CHLORIDE 75 MILLILITER(S): 9 INJECTION, SOLUTION INTRAVENOUS at 18:30

## 2019-01-01 RX ADMIN — RIVAROXABAN 15 MILLIGRAM(S): KIT at 07:49

## 2019-01-01 RX ADMIN — Medication 30 MILLIGRAM(S): at 23:08

## 2019-01-01 RX ADMIN — FIDAXOMICIN 200 MILLIGRAM(S): 200 GRANULE, FOR SUSPENSION ORAL at 18:22

## 2019-01-01 RX ADMIN — Medication 125 MILLIGRAM(S): at 23:07

## 2019-01-01 RX ADMIN — Medication 100 MILLIGRAM(S): at 22:42

## 2019-01-01 RX ADMIN — Medication 125 MILLIGRAM(S): at 05:12

## 2019-01-01 RX ADMIN — RIVAROXABAN 15 MILLIGRAM(S): KIT at 18:00

## 2019-01-01 RX ADMIN — RIVAROXABAN 15 MILLIGRAM(S): KIT at 07:58

## 2019-01-01 RX ADMIN — Medication 30 MILLIGRAM(S): at 13:25

## 2019-01-01 RX ADMIN — Medication 100 MILLIGRAM(S): at 14:43

## 2019-01-01 RX ADMIN — Medication 125 MILLIGRAM(S): at 18:11

## 2019-01-01 RX ADMIN — Medication 30 MILLIGRAM(S): at 11:01

## 2019-01-01 RX ADMIN — Medication 0.12 MILLIGRAM(S): at 06:43

## 2019-01-01 RX ADMIN — FIDAXOMICIN 200 MILLIGRAM(S): 200 GRANULE, FOR SUSPENSION ORAL at 18:00

## 2019-01-01 RX ADMIN — Medication 125 MILLIGRAM(S): at 06:29

## 2019-01-01 RX ADMIN — NYSTATIN CREAM 1 APPLICATION(S): 100000 CREAM TOPICAL at 05:04

## 2019-01-01 RX ADMIN — Medication 180 MILLIGRAM(S): at 05:53

## 2019-01-01 RX ADMIN — Medication 125 MILLIGRAM(S): at 11:12

## 2019-01-01 RX ADMIN — CHLORHEXIDINE GLUCONATE 1 APPLICATION(S): 213 SOLUTION TOPICAL at 06:43

## 2019-01-01 RX ADMIN — Medication 20 MILLIGRAM(S): at 05:27

## 2019-01-01 RX ADMIN — IOHEXOL 30 MILLILITER(S): 300 INJECTION, SOLUTION INTRAVENOUS at 11:37

## 2019-01-01 RX ADMIN — FIDAXOMICIN 200 MILLIGRAM(S): 200 GRANULE, FOR SUSPENSION ORAL at 17:50

## 2019-01-01 RX ADMIN — RIVAROXABAN 15 MILLIGRAM(S): KIT at 08:37

## 2019-01-01 RX ADMIN — MEGESTROL ACETATE 400 MILLIGRAM(S): 40 SUSPENSION ORAL at 11:57

## 2019-01-01 RX ADMIN — Medication 120 MILLIGRAM(S): at 06:11

## 2019-01-01 RX ADMIN — Medication 500 MILLIGRAM(S): at 05:41

## 2019-01-01 RX ADMIN — Medication 125 MILLIGRAM(S): at 21:53

## 2019-01-01 RX ADMIN — NYSTATIN CREAM 1 APPLICATION(S): 100000 CREAM TOPICAL at 05:47

## 2019-01-01 RX ADMIN — Medication 30 MILLIGRAM(S): at 06:04

## 2019-01-01 RX ADMIN — Medication 0.25 MILLIGRAM(S): at 15:10

## 2019-01-01 RX ADMIN — FIDAXOMICIN 200 MILLIGRAM(S): 200 GRANULE, FOR SUSPENSION ORAL at 06:25

## 2019-01-01 RX ADMIN — FIDAXOMICIN 200 MILLIGRAM(S): 200 GRANULE, FOR SUSPENSION ORAL at 05:27

## 2019-01-01 RX ADMIN — RIVAROXABAN 15 MILLIGRAM(S): KIT at 08:13

## 2019-01-01 RX ADMIN — RIVAROXABAN 15 MILLIGRAM(S): KIT at 07:52

## 2019-01-01 RX ADMIN — Medication 3 MILLILITER(S): at 14:41

## 2019-01-01 RX ADMIN — FIDAXOMICIN 200 MILLIGRAM(S): 200 GRANULE, FOR SUSPENSION ORAL at 06:22

## 2019-01-01 RX ADMIN — FIDAXOMICIN 200 MILLIGRAM(S): 200 GRANULE, FOR SUSPENSION ORAL at 06:17

## 2019-01-01 RX ADMIN — FIDAXOMICIN 200 MILLIGRAM(S): 200 GRANULE, FOR SUSPENSION ORAL at 06:06

## 2019-01-01 RX ADMIN — MAGNESIUM OXIDE 400 MG ORAL TABLET 400 MILLIGRAM(S): 241.3 TABLET ORAL at 11:40

## 2019-01-01 RX ADMIN — CHLORHEXIDINE GLUCONATE 1 APPLICATION(S): 213 SOLUTION TOPICAL at 05:41

## 2019-01-01 RX ADMIN — LISINOPRIL 40 MILLIGRAM(S): 2.5 TABLET ORAL at 10:24

## 2019-01-01 RX ADMIN — Medication 30 MILLIGRAM(S): at 17:56

## 2019-01-01 RX ADMIN — FIDAXOMICIN 200 MILLIGRAM(S): 200 GRANULE, FOR SUSPENSION ORAL at 06:27

## 2019-01-01 RX ADMIN — CHLORHEXIDINE GLUCONATE 1 APPLICATION(S): 213 SOLUTION TOPICAL at 13:29

## 2019-01-01 RX ADMIN — Medication 3 MILLILITER(S): at 19:53

## 2019-01-01 RX ADMIN — Medication 125 MILLIGRAM(S): at 00:09

## 2019-01-01 RX ADMIN — Medication 30 MILLIGRAM(S): at 06:29

## 2019-01-01 RX ADMIN — NYSTATIN CREAM 1 APPLICATION(S): 100000 CREAM TOPICAL at 05:55

## 2019-01-01 RX ADMIN — NYSTATIN CREAM 1 APPLICATION(S): 100000 CREAM TOPICAL at 06:45

## 2019-01-01 RX ADMIN — Medication 100 MILLIGRAM(S): at 14:06

## 2019-01-01 RX ADMIN — RIVAROXABAN 10 MILLIGRAM(S): KIT at 11:14

## 2019-01-01 RX ADMIN — NYSTATIN CREAM 1 APPLICATION(S): 100000 CREAM TOPICAL at 17:35

## 2019-01-01 RX ADMIN — RIVAROXABAN 10 MILLIGRAM(S): KIT at 17:27

## 2019-01-01 RX ADMIN — Medication 1 TABLET(S): at 11:37

## 2019-01-01 RX ADMIN — FIDAXOMICIN 200 MILLIGRAM(S): 200 GRANULE, FOR SUSPENSION ORAL at 05:04

## 2019-01-01 RX ADMIN — Medication 30 MILLIGRAM(S): at 05:07

## 2019-01-01 RX ADMIN — Medication 1 TABLET(S): at 11:35

## 2019-01-01 RX ADMIN — RIVAROXABAN 15 MILLIGRAM(S): KIT at 17:30

## 2019-01-01 RX ADMIN — Medication 40 MILLIGRAM(S): at 17:30

## 2019-01-01 RX ADMIN — Medication 100 MILLIGRAM(S): at 06:59

## 2019-01-01 RX ADMIN — RIVAROXABAN 10 MILLIGRAM(S): KIT at 12:10

## 2019-01-01 RX ADMIN — Medication 30 MILLIGRAM(S): at 23:06

## 2019-01-01 RX ADMIN — Medication 30 MILLIGRAM(S): at 00:23

## 2019-01-01 RX ADMIN — CHLORHEXIDINE GLUCONATE 1 APPLICATION(S): 213 SOLUTION TOPICAL at 11:04

## 2019-01-01 RX ADMIN — Medication 125 MILLIGRAM(S): at 12:29

## 2019-01-01 RX ADMIN — Medication 1 TABLET(S): at 11:41

## 2019-01-01 RX ADMIN — Medication 30 MILLIGRAM(S): at 05:51

## 2019-01-01 RX ADMIN — Medication 50 GRAM(S): at 16:32

## 2019-01-01 RX ADMIN — Medication 50 GRAM(S): at 08:58

## 2019-01-01 RX ADMIN — Medication 1 TABLET(S): at 11:32

## 2019-01-01 RX ADMIN — NYSTATIN CREAM 1 APPLICATION(S): 100000 CREAM TOPICAL at 05:32

## 2019-01-01 RX ADMIN — Medication 30 MILLIGRAM(S): at 11:41

## 2019-01-01 RX ADMIN — Medication 250 MILLIGRAM(S): at 13:27

## 2019-01-01 RX ADMIN — Medication 30 MILLIGRAM(S): at 05:54

## 2019-01-01 RX ADMIN — CHLORHEXIDINE GLUCONATE 1 APPLICATION(S): 213 SOLUTION TOPICAL at 12:07

## 2019-01-01 RX ADMIN — Medication 0.12 MILLIGRAM(S): at 05:04

## 2019-01-01 RX ADMIN — SODIUM CHLORIDE 75 MILLILITER(S): 9 INJECTION, SOLUTION INTRAVENOUS at 11:04

## 2019-01-01 RX ADMIN — Medication 30 MILLIGRAM(S): at 17:13

## 2019-01-01 RX ADMIN — Medication 180 MILLIGRAM(S): at 05:04

## 2019-01-01 RX ADMIN — Medication 30 MILLIGRAM(S): at 23:11

## 2019-01-01 RX ADMIN — Medication 125 MILLIGRAM(S): at 17:58

## 2019-01-01 RX ADMIN — RIVAROXABAN 15 MILLIGRAM(S): KIT at 12:33

## 2019-01-01 RX ADMIN — Medication 100 MILLIGRAM(S): at 05:42

## 2019-01-01 RX ADMIN — Medication 0.12 MILLIGRAM(S): at 06:27

## 2019-01-01 RX ADMIN — Medication 30 MILLIGRAM(S): at 17:58

## 2019-01-01 RX ADMIN — NYSTATIN CREAM 1 APPLICATION(S): 100000 CREAM TOPICAL at 06:22

## 2019-01-01 RX ADMIN — HEPARIN SODIUM 5000 UNIT(S): 5000 INJECTION INTRAVENOUS; SUBCUTANEOUS at 06:01

## 2019-01-01 RX ADMIN — Medication 100 MILLIGRAM(S): at 17:53

## 2019-01-01 RX ADMIN — Medication 30 MILLIGRAM(S): at 18:22

## 2019-01-01 RX ADMIN — Medication 120 MILLIGRAM(S): at 11:51

## 2019-01-01 RX ADMIN — NYSTATIN CREAM 1 APPLICATION(S): 100000 CREAM TOPICAL at 17:18

## 2019-01-01 RX ADMIN — Medication 23.44 MICROGRAM(S)/KG/MIN: at 15:06

## 2019-01-01 RX ADMIN — Medication 10 MILLIGRAM(S): at 13:40

## 2019-01-01 RX ADMIN — Medication 100 MILLIGRAM(S): at 06:11

## 2019-01-01 RX ADMIN — HEPARIN SODIUM 5000 UNIT(S): 5000 INJECTION INTRAVENOUS; SUBCUTANEOUS at 17:41

## 2019-01-01 RX ADMIN — Medication 120 MILLIGRAM(S): at 05:32

## 2019-01-01 RX ADMIN — Medication 100 GRAM(S): at 16:45

## 2019-01-01 RX ADMIN — Medication 125 MILLIGRAM(S): at 12:59

## 2019-01-01 RX ADMIN — CEFTRIAXONE 100 MILLIGRAM(S): 500 INJECTION, POWDER, FOR SOLUTION INTRAMUSCULAR; INTRAVENOUS at 20:22

## 2019-01-01 RX ADMIN — FIDAXOMICIN 200 MILLIGRAM(S): 200 GRANULE, FOR SUSPENSION ORAL at 05:55

## 2019-01-01 RX ADMIN — FIDAXOMICIN 200 MILLIGRAM(S): 200 GRANULE, FOR SUSPENSION ORAL at 18:09

## 2019-01-25 ENCOUNTER — OUTPATIENT (OUTPATIENT)
Dept: OUTPATIENT SERVICES | Facility: HOSPITAL | Age: 84
LOS: 1 days | Discharge: HOME | End: 2019-01-25

## 2019-01-25 DIAGNOSIS — R31.0 GROSS HEMATURIA: ICD-10-CM

## 2019-01-25 DIAGNOSIS — I10 ESSENTIAL (PRIMARY) HYPERTENSION: ICD-10-CM

## 2019-01-25 DIAGNOSIS — D64.9 ANEMIA, UNSPECIFIED: ICD-10-CM

## 2019-01-25 DIAGNOSIS — Z90.710 ACQUIRED ABSENCE OF BOTH CERVIX AND UTERUS: Chronic | ICD-10-CM

## 2019-01-25 DIAGNOSIS — Z99.89 DEPENDENCE ON OTHER ENABLING MACHINES AND DEVICES: ICD-10-CM

## 2019-01-25 DIAGNOSIS — Z90.49 ACQUIRED ABSENCE OF OTHER SPECIFIED PARTS OF DIGESTIVE TRACT: Chronic | ICD-10-CM

## 2019-01-25 DIAGNOSIS — R26.0 ATAXIC GAIT: ICD-10-CM

## 2019-01-25 DIAGNOSIS — Z48.00 ENCOUNTER FOR CHANGE OR REMOVAL OF NONSURGICAL WOUND DRESSING: ICD-10-CM

## 2019-09-11 NOTE — ED PROVIDER NOTE - OBJECTIVE STATEMENT
89yo female from home with PMHx HTN, HLD, paroxysmal AFIB not on AC presents brought in by EMS for weakness and diarrhea x 2 days. HHA states 2 days prior pt began having diarrhea 3-4 episodes daily and a cough. Aide states this morning she found patient laying in diarrhea in her bed and was having difficulty standing, notably weak, thus aide called EMS. No fever or blood noted in stool by aide. Aide notes similar symptoms one year prior.

## 2019-09-11 NOTE — H&P ADULT - NSHPSOCIALHISTORY_GEN_ALL_CORE
Lives at home with home health aid  Ambulates with walker  No EtOH and illicit drug use  Former smoker; quit in 1970s; 30 pack year history

## 2019-09-11 NOTE — ED PROVIDER NOTE - NS ED ROS FT
CONST: No fever, chills or bodyaches  CARD: No chest pain, palpitations  RESP: No SOB, cough  GI: No abdominal pain, N/V/D  MS: No joint pain, back pain or extremity pain/injury  SKIN: No rashes  NEURO: No headache, dizziness, paresthesias or LOC

## 2019-09-11 NOTE — ED PROVIDER NOTE - CARE PLAN
Principal Discharge DX:	Colitis  Secondary Diagnosis:	Sepsis, due to unspecified organism  Secondary Diagnosis:	LARS (acute kidney injury)  Secondary Diagnosis:	Troponin level elevated  Secondary Diagnosis:	Transaminitis

## 2019-09-11 NOTE — ED PROVIDER NOTE - ATTENDING CONTRIBUTION TO CARE
89 yo f PMHx of paroxysmal AF not on AC, HTN, HLD here for diarrhea. pt limited historian, per EMS, pt w/ 2 days profuse watery diarrhea. no black/tarry. no cp, sob, syndope pt denies AP, N, V. pt is unsure of fever. pt lives independently at private res w/ hha. s/p 1L NS by EMS    vs hypotensive, rectal T 99.5F  gen- NAD, aaox3, elderly, MM dry  card-rrr  lungs-ctab, no wheezing or rhonchi  abd-soft, nontender however mild lower abdo discomfort on palpation, no guarding, no guarding or rebound, rectal- dark brown stool, no blood  neuro- full str/sensation, cn ii-xii grossly intact, normal coordination    pt w/ hypotension after 2 days diarrhea, s/p 1 L NS by EMS  pt w/ dry MM on exam, mild abdo discomfort    will get belly labs, lytes, r/o source of infection- cxr, ua, r/o acs vs assess for end organ dysfunction given hypotension. no clear source of infection or fever, will continue to monitor but no need for code sepsis at this point. hypotension may be product of dehydration  ctap r/o intra-abdo pathology  reassess 89 yo f PMHx of paroxysmal AF not on AC, HTN, HLD here for diarrhea. pt limited historian, per EMS, pt w/ 2 days profuse watery diarrhea. no black/tarry. no cp, sob, syndope pt denies AP, N, V. pt is unsure of fever. pt lives independently at private res w/ hha. s/p 250mL NS by EMS    vs hypotensive, rectal T 99.5F  gen- NAD, aaox3, elderly, MM dry  card-rrr  lungs-ctab, no wheezing or rhonchi  abd-soft, nontender however mild lower abdo discomfort on palpation, no guarding, no guarding or rebound, rectal- dark brown stool, no blood  neuro- full str/sensation, cn ii-xii grossly intact, normal coordination    pt w/ hypotension after 2 days diarrhea, s/p 1 L NS by EMS  pt w/ dry MM on exam, mild abdo discomfort    will get belly labs, lytes, r/o source of infection- cxr, ua, r/o acs vs assess for end organ dysfunction given hypotension. no clear source of infection or fever, will continue to monitor but no need for code sepsis at this point. hypotension may be product of dehydration  ctap r/o intra-abdo pathology  reassess

## 2019-09-11 NOTE — ED PROVIDER NOTE - SECONDARY DIAGNOSIS.
Troponin level elevated Transaminitis Sepsis, due to unspecified organism LARS (acute kidney injury)

## 2019-09-11 NOTE — H&P ADULT - ASSESSMENT
This is a 89 year old female who presented with a cc/o weakness and diarrhea x2 days. She has a significant PMHx of HTN, HLD, and Paroxysmal Atrial Fibrillation (not on AC). She was brought in via EMS, called by home aid, after she was found in bed after an episode of diarrhea. She was also noted to be considerably weaker than her baseline. The diarrhea has occurred approximately 3-4 times daily for the last 2 days.     Severe Sepsis 2/2 Acute Colitis  - Hypotensive with tachycardia on presentation; No fever or leukocytosis  - Hypotension was fluid responsive  - Lactate elevated on admission  - CT A/P with Circumferential colonic wall thickening extending from the hepatic flexure to the descending colon, compatible with colitis which may be of infectious or inflammatory etiology  - C. difficile negative  - Continue antibiotics and follow up cultures    Transaminitis, Acute kidney injury likely secondary to hypotension  - Continue IVF and Monitor    Elevated troponin  - Likely Type II NSTEMI 2/2 demand ischemia  - EKG unremarkable and patient asymptomatic  - Trend    Thrombocytopenia, possibly secondary to acute infection  - Monitor    Mild left hydronephroureter; Nephrolithiasis  - Noted on CT A/P without a definite obstructing calculi  - non-obstructing 0.4 x 0.3 x 0.4 cm calculus seen in the mildly dilated left renal pelvis    Hx of Atrial Fibrillation:  Hx of HTN and HLD:    Electrolyte Imbalances: Magnesium deficiency; replete as needed and follow up in AM      GI ppx:                                   [X] Not indicated    DVT ppx:  [X] Heparin    Fluids:   [X] IVF    Activity:  [X] Increase as Tolerated    BMI:  Weight (kg): 49.9 (09-11)    DISPO:  Patient to be discharged when condition(s) optimized.    CODE STATUS  [X] FULL This is a 89 year old female who presented with a cc/o weakness and diarrhea x2 days. She has a significant PMHx of HTN, HLD, and Paroxysmal Atrial Fibrillation (not on AC). She was brought in via EMS, called by home aid, after she was found in bed after an episode of diarrhea. She was also noted to be considerably weaker than her baseline. The diarrhea has occurred approximately 3-4 times daily for the last 2 days.     Severe Sepsis 2/2 Acute Colitis  - Hypotensive with tachycardia & leukocytosis on presentation; No fever  - Hypotension was fluid responsive  - Lactate elevated on admission  - CT A/P with Circumferential colonic wall thickening extending from the hepatic flexure to the descending colon, compatible with colitis which may be of infectious or inflammatory etiology  - C. difficile negative  - Continue antibiotics and follow up cultures  - Can resume diet in Am if tolerates; Needs bedside evaluation    Transaminitis, Acute kidney injury likely secondary to hypotension  - Continue IVF and Monitor    Elevated troponin  - Likely Type II NSTEMI 2/2 demand ischemia  - EKG unremarkable and patient asymptomatic  - Trend    Thrombocytopenia, possibly secondary to acute infection  - Monitor    Mild left hydronephroureter; Nephrolithiasis  - Noted on CT A/P without a definite obstructing calculi  - non-obstructing 0.4 x 0.3 x 0.4 cm calculus seen in the mildly dilated left renal pelvis    Hx of Atrial Fibrillation:  Hx of HTN and HLD:    Electrolyte Imbalances: Magnesium deficiency; replete as needed and follow up in AM      GI ppx:                                   [X] Not indicated    DVT ppx:  [X] Heparin    Fluids:   [X] IVF    Activity:  [X] Increase as Tolerated    BMI:  Weight (kg): 49.9 (09-11)    DISPO:  Patient to be discharged when condition(s) optimized.    CODE STATUS  [X] FULL This is a 89 year old female who presented with a cc/o weakness and diarrhea x2 days. She has a significant PMHx of HTN, HLD, and Paroxysmal Atrial Fibrillation (not on AC). She was brought in via EMS, called by home aid, after she was found in bed after an episode of diarrhea. She was also noted to be considerably weaker than her baseline. The diarrhea has occurred approximately 3-4 times daily for the last 2 days.     ==================================================  Patient unable to remember medications, medical history, or pharmacy at time of admission. Daughter Emmy [[phone]] was called as well, however, she did not answer. Please follow up in AM with family.  DNR/DNI was filled out as per patient wish; she was oriented to person, place, date, and president; Will need attending signature.   ==================================================      Severe Sepsis 2/2 Acute Colitis  - Hypotensive with tachycardia & leukocytosis on presentation; No fever  - Hypotension was fluid responsive  - Lactate elevated on admission  - CT A/P with Circumferential colonic wall thickening extending from the hepatic flexure to the descending colon, compatible with colitis which may be of infectious or inflammatory etiology  - C. difficile negative  - Continue antibiotics, CIpro/Flagyl  - Follow up cultures (Blood, stool)  - Can resume diet in AM if tolerates; Needs bedside evaluation    Transaminitis, Acute kidney injury likely secondary to hypotension  - Continue IVF and Monitor    Elevated troponin  - Likely Type II NSTEMI 2/2 demand ischemia  - EKG unremarkable and patient asymptomatic  - Trend    Thrombocytopenia, possibly secondary to acute infection  - Monitor    Mild left hydronephroureter; Nephrolithiasis  - Noted on CT A/P without a definite obstructing calculi  - non-obstructing 0.4 x 0.3 x 0.4 cm calculus seen in the mildly dilated left renal pelvis    Hx of Atrial Fibrillation  Hx of HTN and HLD    Electrolyte Imbalances: Magnesium deficiency; replete as needed and follow up in AM      GI ppx:                                   [X] Not indicated    DVT ppx:  [X] Heparin    Fluids:   [X] IVF    Activity:  [X] Increase as Tolerated    BMI:  Weight (kg): 49.9 (09-11)    DISPO:  Patient to be discharged when condition(s) optimized.    CODE STATUS  [X] FULL This is a 89 year old female who presented with a cc/o weakness and diarrhea x2 days. She has a significant PMHx of HTN, HLD, and Paroxysmal Atrial Fibrillation (not on AC). She was brought in via EMS, called by home aid, after she was found in bed after an episode of diarrhea. She was also noted to be considerably weaker than her baseline. The diarrhea has occurred approximately 3-4 times daily for the last 2 days.     ==========================================================  Patient unable to remember medications, medical history, or pharmacy at time of admission. Daughter Emmy [[phone]] was called as well, however, she did not answer. Please follow up in AM with family.  MOLST DNR/DNI was filled out as per patient wish; she was oriented to person, place, date, and president; Will need attending signature.   ==========================================================    Severe Sepsis 2/2 Acute Colitis  - Hypotensive with tachycardia & leukocytosis on presentation; No fever  - Hypotension was fluid responsive  - Lactate elevated on admission  - CT A/P with Circumferential colonic wall thickening extending from the hepatic flexure to the descending colon, compatible with colitis which may be of infectious or inflammatory etiology  - C. difficile negative  - Continue antibiotics, CIpro/Flagyl  - Follow up cultures (Blood, stool)  - Can resume diet in AM if tolerates; Needs bedside evaluation    Transaminitis, Acute kidney injury likely secondary to hypotension  - Continue IVF and Monitor    Elevated troponin  - Likely Type II NSTEMI 2/2 demand ischemia  - EKG unremarkable and patient asymptomatic  - Trend    Thrombocytopenia, possibly secondary to acute infection  - Monitor    Mild left hydronephroureter; Nephrolithiasis  - Noted on CT A/P without a definite obstructing calculi  - non-obstructing 0.4 x 0.3 x 0.4 cm calculus seen in the mildly dilated left renal pelvis    Hx of Atrial Fibrillation  Hx of HTN and HLD    Electrolyte Imbalances: Magnesium deficiency; replete as needed and follow up in AM      GI ppx:                                   [X] Not indicated    DVT ppx:  [X] Heparin    Fluids:   [X] IVF    Activity:  [X] Increase as Tolerated    BMI:  Weight (kg): 49.9 (09-11)    DISPO:  Patient to be discharged when condition(s) optimized.    CODE STATUS  [X] FULL

## 2019-09-11 NOTE — ED PROVIDER NOTE - PROGRESS NOTE DETAILS
Sepsis suspected at this time. - vanc/aztreonam/flagyl, full fluids ordered  IVF bolus cannot be given due to: ( ) CHF ( ) CRF ( ) Hospice or comfort measures only ( ) Patient refusal BP improving w/ fluids pt feeling a bit better, VS stable. will consult ICU for persistently elevated lactate, hypotension in ED. ICU came to eval pt. pt rejected from ICU. clinically well appearing. pt endorsed to MAR. troponin elevated likely 2/2 shock, pt denies cp. ekg nonischaemic. unlikely acs

## 2019-09-11 NOTE — ED PROVIDER NOTE - CLINICAL SUMMARY MEDICAL DECISION MAKING FREE TEXT BOX
pt p/w diarrhea, hypotension, s/p code sepsis abx and 30 cc/kg fluids. source appears to be colitis on ct. ctx blood, urine, stool, cdiff sent. pt evaluated by ICU and rejected. will admit to floor

## 2019-09-11 NOTE — H&P ADULT - NSHPPHYSICALEXAM_GEN_ALL_CORE
:  VITAL SIGNS: Last 24 Hours  T(C): 36.8 (11 Sep 2019 15:15), Max: 37.3 (11 Sep 2019 10:15)  T(F): 98.2 (11 Sep 2019 15:15), Max: 99.2 (11 Sep 2019 10:15)  HR: 92 (11 Sep 2019 17:58) (86 - 96)  BP: 114/66 (11 Sep 2019 17:58) (76/50 - 119/87)  BP(mean): --  RR: 18 (11 Sep 2019 09:50) (18 - 18)  SpO2: 98% (11 Sep 2019 17:58) (96% - 99%)    PHYSICAL EXAM:  GENERAL:   Awake, alert; NAD.  HEENT:  Head NC/AT; Conjunctivae pink, Sclera anicteric; Oral mucosa moist.  CARDIO:   Regular rate; Regular rhythm; S1 & S2.  RESP:   No rales or rhonchi appreciated.  GI:   Soft; NT/ND; BS; No guarding; No rebound tenderness.  EXT:   No edema in UE and LE.  NEURO:   PERRL.  SKIN:   Intact. :  VITAL SIGNS: Last 24 Hours  T(C): 36.8 (11 Sep 2019 15:15), Max: 37.3 (11 Sep 2019 10:15)  T(F): 98.2 (11 Sep 2019 15:15), Max: 99.2 (11 Sep 2019 10:15)  HR: 92 (11 Sep 2019 17:58) (86 - 96)  BP: 114/66 (11 Sep 2019 17:58) (76/50 - 119/87)  BP(mean): --  RR: 18 (11 Sep 2019 09:50) (18 - 18)  SpO2: 98% (11 Sep 2019 17:58) (96% - 99%)    PHYSICAL EXAM:  GENERAL:   Awake, alert; NAD.  HEENT:  Head NC/AT; Conjunctivae pink, Sclera anicteric; Oral mucosa dry  CARDIO:   Regular rate; Regular rhythm; S1 & S2.  RESP:   Barrel shape chest; No rales or rhonchi appreciated but decreased entry.  GI:   Soft; NT/ND; BS; No guarding; No rebound tenderness.  EXT:   No edema in LE.

## 2019-09-11 NOTE — H&P ADULT - ATTENDING COMMENTS
Patient seen and examined independently. Agree with resident note/ history / physical exam and plan of care with following exceptions/additions/updates. Case discussed with house-staff, nursing and patient/pt decision maker.   pt is feeling better, has no palpitation or discomfort, she is awake and alert. she is eating lunch now.     her hr is still not controlled. has hx of afib. at home she is on metoprolol and xarelto. will resume both.    BP is 148/72    no acute distress.                         11.3   18.05 )-----------( 87       ( 12 Sep 2019 06:13 )             34.3   09-12    142  |  106  |  39<H>  ----------------------------<  74  3.9   |  24  |  1.4    Ca    8.0<L>      12 Sep 2019 06:13  Phos  3.9     09-11  Mg     2.2     09-12    TPro  5.6<L>  /  Alb  2.9<L>  /  TBili  0.5  /  DBili  x   /  AST  70<H>  /  ALT  89<H>  /  AlkPhos  79  09-12    < from: Xray Chest 1 View-PORTABLE IMMEDIATE (09.11.19 @ 14:47) >    No radiographic evidence of acute cardiopulmonary disease.    < end of copied text >    Urinalysis (09.11.19 @ 12:03)    Glucose Qualitative, Urine: Negative    Blood, Urine: Moderate    pH Urine: 8.5    Color: Yellow    Urine Appearance: Slightly Turbid    Bilirubin: Negative    Ketone - Urine: Negative    Specific Gravity: 1.017    Protein, Urine: 100 mg/dL    Urobilinogen: <2 mg/dL    Nitrite: Negative    Leukocyte Esterase Concentration: Small        a/p  #sepsis poa, due to colitis,  C Diff by PCR Result: Negative  cont flagyl and cipro    #afib with RVR , resume  meds, iv cardizem given, now is better ( 90's)     #PAST MEDICAL & SURGICAL HISTORY: cont meds  Dyslipidemia  Hypertension  H/O abdominal hysterectomy  History of cholecystectomy    #Progress Note Handoff  Pending (specify):  cbc and bmp repeat. HR control   Family discussion: dw pt and resident spoke with family, pt is DNR, conservative management.   Disposition: Home with [facility]

## 2019-09-11 NOTE — H&P ADULT - HISTORY OF PRESENT ILLNESS
This is a 89 year old female who presented with a cc/o weakness and diarrhea x2 days. She has a significant PMHx of HTN, HLD, and Paroxysmal Atrial Fibrilaltion (not on AC). She was brought in via EMS, called by home aid, after she was found in bed after an episode of diarrhea. She was also noted to be considerably weaker than her baseline. The diarrhea has occurred approximately 3-4 times daily for the last 2 days. Per the aid, the diarrhea was non-bloody.     In the ED, she was found to be hypotensive. This is a 89 year old female who presented with a cc/o weakness and diarrhea x2 days. She has a significant PMHx of HTN, HLD, and Paroxysmal Atrial Fibrilaltion (not on AC). She was brought in via EMS, called by home aid, after she was found in bed after an episode of diarrhea. She was also noted to be considerably weaker than her baseline. The diarrhea has occurred approximately 3-4 times daily for the last 2 days. Per the aid, the diarrhea was non-bloody.     In the ED, she was found to be hypotensive and tachycardia with leukocytosis and elevated lactate. CT A/P was consistent with colitis from the hepatic flexure to the descending colon. Labs revealed elevated troponin, LARS, and transaminitis likely secondary to the hypotension, which was fluid responsive in the ED. This is a 89 year old female who presented with a cc/o weakness and diarrhea x2 days. She has a significant PMHx of HTN, HLD, and Paroxysmal Atrial Fibrilaltion (not on AC). She was brought in via EMS, called by home aid, after she was found in bed after an episode of diarrhea. She was also noted to be considerably weaker than her baseline. The diarrhea has occurred approximately 3-4 times daily for the last 2 days. Per the aid, the diarrhea was watery and non-bloody.     In the ED, she was found to be hypotensive and tachycardia with leukocytosis and elevated lactate. CT A/P was consistent with colitis from the hepatic flexure to the descending colon. Labs revealed elevated troponin, LARS, and transaminitis likely secondary to the hypotension, which was fluid responsive in the ED.

## 2019-09-11 NOTE — H&P ADULT - NSHPLABSRESULTS_GEN_ALL_CORE
:  LAB RESULTS:                        13.4   29.49 )-----------( 113      ( 11 Sep 2019 10:06 )             40.3     142  |  104  |  41<H>  ----------------------------<  108<H>  5.1<H>   |  24  |  2.3<H>    Ca      8.7      11 Sep 2019 10:06  Phos   3.9       Mg     1.7         TPro  6.7  /  Alb  3.4<L>  /  TBili  0.5  /  DBili  x   /  AST  181<H>  /  ALT  164<H>  /  AlkPhos  96      PT/INR - ( 11 Sep 2019 12:40 )   PT: 13.60 sec;   INR: 1.18 ratio    PTT - ( 11 Sep 2019 12:40 )  PTT:35.5 sec    Urinalysis Basic - ( 11 Sep 2019 12:03 )  Color: Yellow / Appearance: Slightly Turbid / S.017 / pH: x  Gluc: x / Ketone: Negative  / Bili: Negative / Urobili: <2 mg/dL   Blood: x / Protein: 100 mg/dL / Nitrite: Negative   Leuk Esterase: Small / RBC: 12 /HPF / WBC 1 /HPF   Sq Epi: x / Non Sq Epi: 0 /HPF / Bacteria: Moderate    Lactate, Blood: 4.7 mmol/L <HH> (19 @ 12:40)    Troponin T, Serum: 0.06 ng/mL <HH> (19 @ 10:06)    CARDIAC MARKERS ( 11 Sep 2019 10:06 )  x     / 0.06 ng/mL / x     / x     / x        MICROBIOLOGY: NTR    RADIOLOGY:  CT Abdomen and Pelvis w/ Oral Cont (19 @ 13:49)     IMPRESSION:   1.  Circumferential colonic wall thickening extending from the hepatic flexure to the descending colon, compatible with colitis which may be of infectious or inflammatory etiology.   2.  Mild left hydronephroureter to the level of the urinary bladder without a definite obstructing calculi; nonobstructing 0.4 x 0.3 x 0.4 cm calculus seen in the mildly dilated left renal pelvis.    ALLERGIES:  penicillin (Rash)  ===========================================================

## 2019-09-11 NOTE — ED PROVIDER NOTE - PHYSICAL EXAMINATION
CONST: Frail appearing  NECK: Non-tender, no meningeal signs  CARD: Normal S1 S2; Normal rate and rhythm  RESP: Equal BS B/L, No wheezes, rhonchi or rales. No distress  GI: Soft, non-tender, non-distended.  MS: Normal ROM in all extremities. No midline spinal tenderness.  SKIN: Warm, dry, no acute rashes. Good turgor  NEURO: A&Ox3, No focal deficits. Strength 5/5 with no sensory deficits.

## 2019-09-11 NOTE — ED ADULT NURSE NOTE - OBJECTIVE STATEMENT
Pt c/o diarrhea since yesterday. Pt BP low in EMS, received IV fluids, 20g IV in left wrist from EMS . Pt is A&Ox4, hard of hearing. Pt denies chest pain, SOB, dizziness, abdominal pain, or fevers. Abdomen soft, nondistended, nontender. No fever rectally. BP 88/61. O2 sat 99% on room air. Pt in NSR on tele monitor.

## 2019-09-12 NOTE — PHYSICAL THERAPY INITIAL EVALUATION ADULT - PLANNED THERAPY INTERVENTIONS, PT EVAL
bed mobility training/transfer training/postural re-education/strengthening/gait training/balance training

## 2019-09-12 NOTE — PROGRESS NOTE ADULT - ASSESSMENT
This is a 89 year old female who presented with a cc/o weakness and diarrhea x2 days. She has a significant PMHx of HTN, HLD, and Paroxysmal Atrial Fibrillation (not on AC). She was brought in via EMS, called by home aid, after she was found in bed after an episode of diarrhea. She was also noted to be considerably weaker than her baseline. The diarrhea has occurred approximately 3-4 times daily for the last 2 days.       Severe Sepsis 2/2 Acute Colitis  -  Sepsis on admission- Hypotensive with tachycardia & leukocytosis on presentation; No fever  - HD stable now, Normotensive. HR mild tachycardia.  - Lactate elevated on admission- s/p improved after IV hydration. Give NS bolus 500cc bolus now.   - CT A/P with Circumferential colonic wall thickening extending from the hepatic flexure to the descending colon, compatible with colitis which may be of infectious or inflammatory etiology  - C. difficile negative  - Continue antibiotics, CIpro/Flagyl  - Follow up cultures (Blood, stool)  - monitor WBC, fever curve. Leukocytosis improving.     # +UA  - asymptomatic  - f/u urine cx  - On cipro.     Transaminitis, Acute kidney injury likely secondary to hypotension  - Transaminitis improving, monitor  - LARS 2/2 hypotension and pre-renal likely 2/2 poor oral intake- improving  - restart diet, monitor serum Cr.     Elevated troponin  - Likely Type II NSTEMI 2/2 demand ischemia, no ACS  - EKG unremarkable and patient asymptomatic  - Troponin 0.06 > 0.03.    Thrombocytopenia  - Monitor    Mild left hydronephroureter; Nephrolithiasis  - Noted on CT A/P without a definite obstructing calculi  - non-obstructing 0.4 x 0.3 x 0.4 cm calculus seen in the mildly dilated left renal pelvis  - monitor serum Cr    Hx of Atrial Fibrillation  Hx of HTN and HLD        GI ppx:                                   [X] Not indicated    DVT ppx:  [X] Heparin      Activity:  [X] Increase as Tolerated    DISPO:  Patient to be discharged when condition(s) optimized.    CODE STATUS  [X] FULL

## 2019-09-12 NOTE — PHYSICAL THERAPY INITIAL EVALUATION ADULT - GAIT DISTANCE, PT EVAL
5' x 2 restricted by limited space with +tele/IV lines, and HR increased to 125 bpm. Placed back in bed for safety.

## 2019-09-12 NOTE — CONSULT NOTE ADULT - SUBJECTIVE AND OBJECTIVE BOX
HPI:  This is a 89 year old female who presented with a cc/o weakness and diarrhea x2 days. She has a significant PMHx of HTN, HLD, and Paroxysmal Atrial Fibrilaltion (not on AC). She was brought in via EMS, called by home aid, after she was found in bed after an episode of diarrhea. She was also noted to be considerably weaker than her baseline. The diarrhea has occurred approximately 3-4 times daily for the last 2 days. Per the aid, the diarrhea was watery and non-bloody.     In the ED, she was found to be hypotensive and tachycardia with leukocytosis and elevated lactate. CT A/P was consistent with colitis from the hepatic flexure to the descending colon. Labs revealed elevated troponin, LARS, and transaminitis likely secondary to the hypotension, which was fluid responsive in the ED. (11 Sep 2019 19:49)      PAST MEDICAL & SURGICAL HISTORY:  Dyslipidemia  Hypertension  Atrial fibrillation  H/O abdominal hysterectomy  History of cholecystectomy      Hospital Course:    TODAY'S SUBJECTIVE & REVIEW OF SYMPTOMS:     Constitutional WNL   Cardio WNL   Resp WNL   GI WNL  Heme WNL  Endo WNL  Skin WNL  MSK Weakness  Neuro WNL  Cognitive WNL  Psych WNL      MEDICATIONS  (STANDING):  chlorhexidine 4% Liquid 1 Application(s) Topical <User Schedule>  ciprofloxacin   IVPB 400 milliGRAM(s) IV Intermittent daily  heparin  Injectable 5000 Unit(s) SubCutaneous every 8 hours  metroNIDAZOLE  IVPB 500 milliGRAM(s) IV Intermittent every 8 hours  sodium chloride 0.9%. 1000 milliLiter(s) (75 mL/Hr) IV Continuous <Continuous>    MEDICATIONS  (PRN):      FAMILY HISTORY:      Allergies    penicillin (Rash)    Intolerances        SOCIAL HISTORY:    [  ] Etoh  [  ] Smoking  [  ] Substance abuse     Home Environment:  [  ] Home Alone  [  ] Lives with Family  [x  ] Home Health Aid    Dwelling:  [  ] Apartment  [ x ] Private House  [  ] Adult Home  [  ] Skilled Nursing Facility      [  ] Short Term  [  ] Long Term  [  ] Stairs       Elevator [  ]    FUNCTIONAL STATUS PTA: (Check all that apply)  Ambulation: [   ]Independent    [  ] Dependent     [  ] Non-Ambulatory  Assistive Device: [  ] SA Cane  [  ]  Q Cane  [ x ] Walker  [  ]  Wheelchair  ADL : [  ] Independent  [x  ]  Dependent       Vital Signs Last 24 Hrs  T(C): 36.4 (12 Sep 2019 07:42), Max: 37.3 (11 Sep 2019 10:15)  T(F): 97.5 (12 Sep 2019 07:42), Max: 99.2 (11 Sep 2019 10:15)  HR: 106 (12 Sep 2019 07:42) (74 - 106)  BP: 114/73 (12 Sep 2019 07:42) (84/60 - 123/56)  BP(mean): --  RR: 20 (12 Sep 2019 07:42) (17 - 20)  SpO2: 98% (12 Sep 2019 07:42) (96% - 99%)      PHYSICAL EXAM: Alert & awake  GENERAL: NAD  HEAD:  Atraumatic, Normocephalic  CHEST/LUNG: Clear   HEART: S1S2+  ABDOMEN: Soft, Nontender  EXTREMITIES:  no calf tenderness    NERVOUS SYSTEM:  Cranial Nerves 2-12 intact [  ] Abnormal  [  ]  ROM: WFL all extremities [x  ]  Abnormal [  ]  Motor Strength: WFL all extremities  [  ]  Abnormal [ x4/5 all ext ]  Sensation: intact to light touch [x  ] Abnormal [  ]  Reflexes: Symmetric [  ]  Abnormal [  ]    FUNCTIONAL STATUS:  Bed Mobility: Independent [  ]  Supervision [  ]  Needs Assistance [x  ]  N/A [  ]  Transfers: Independent [  ]  Supervision [  ]  Needs Assistance [x  ]  N/A [  ]   Ambulation: Independent [  ]  Supervision [  ]  Needs Assistance [  ]  N/A [  ]  ADL: Independent [  ] Requires Assistance [  ] N/A [  ]      LABS:                        11.3   18.05 )-----------( 87       ( 12 Sep 2019 06:13 )             34.3     09-12    142  |  106  |  39<H>  ----------------------------<  74  3.9   |  24  |  1.4    Ca    8.0<L>      12 Sep 2019 06:13  Phos  3.9     09-11  Mg     2.2     09-12    TPro  5.6<L>  /  Alb  2.9<L>  /  TBili  0.5  /  DBili  x   /  AST  70<H>  /  ALT  89<H>  /  AlkPhos  79  09-12    PT/INR - ( 11 Sep 2019 12:40 )   PT: 13.60 sec;   INR: 1.18 ratio         PTT - ( 11 Sep 2019 12:40 )  PTT:35.5 sec  Urinalysis Basic - ( 11 Sep 2019 12:03 )    Color: Yellow / Appearance: Slightly Turbid / S.017 / pH: x  Gluc: x / Ketone: Negative  / Bili: Negative / Urobili: <2 mg/dL   Blood: x / Protein: 100 mg/dL / Nitrite: Negative   Leuk Esterase: Small / RBC: 12 /HPF / WBC 1 /HPF   Sq Epi: x / Non Sq Epi: 0 /HPF / Bacteria: Moderate        RADIOLOGY & ADDITIONAL STUDIES:    Assesment:

## 2019-09-12 NOTE — PHYSICAL THERAPY INITIAL EVALUATION ADULT - GENERAL OBSERVATIONS, REHAB EVAL
Pt rec supine in bed with +tele, +pulsox, +IV's, +primafit, in NAD. Pt's HR increased from ~100 bpm to 125 bpm during amb. Placed back in bed for safety.

## 2019-09-12 NOTE — PROGRESS NOTE ADULT - SUBJECTIVE AND OBJECTIVE BOX
SUBJECTIVE:    Patient is a 89y old Female who presents with a chief complaint of Sepsis (12 Sep 2019 10:01)    Currently admitted to medicine with the primary diagnosis of Colitis     Today is hospital day 1d. This morning she is resting comfortably in bed and reports no new issues or overnight events.     PAST MEDICAL & SURGICAL HISTORY  Dyslipidemia  Hypertension  Atrial fibrillation  H/O abdominal hysterectomy  History of cholecystectomy    SOCIAL HISTORY:  Negative for smoking/alcohol/drug use.     ALLERGIES:  penicillin (Rash)    MEDICATIONS:  STANDING MEDICATIONS  chlorhexidine 4% Liquid 1 Application(s) Topical <User Schedule>  ciprofloxacin   IVPB 400 milliGRAM(s) IV Intermittent daily  heparin  Injectable 5000 Unit(s) SubCutaneous every 8 hours  metroNIDAZOLE  IVPB 500 milliGRAM(s) IV Intermittent every 8 hours  sodium chloride 0.9%. 1000 milliLiter(s) IV Continuous <Continuous>    PRN MEDICATIONS    VITALS:   T(F): 97.4  HR: 102  BP: 127/87  RR: 20  SpO2: 99%    LABS:                        11.3   18.05 )-----------( 87       ( 12 Sep 2019 06:13 )             34.3         142  |  106  |  39<H>  ----------------------------<  74  3.9   |  24  |  1.4    Ca    8.0<L>      12 Sep 2019 06:13  Phos  3.9       Mg     2.2         TPro  5.6<L>  /  Alb  2.9<L>  /  TBili  0.5  /  DBili  x   /  AST  70<H>  /  ALT  89<H>  /  AlkPhos  79  12    PT/INR - ( 11 Sep 2019 12:40 )   PT: 13.60 sec;   INR: 1.18 ratio         PTT - ( 11 Sep 2019 12:40 )  PTT:35.5 sec  Urinalysis Basic - ( 11 Sep 2019 12:03 )    Color: Yellow / Appearance: Slightly Turbid / S.017 / pH: x  Gluc: x / Ketone: Negative  / Bili: Negative / Urobili: <2 mg/dL   Blood: x / Protein: 100 mg/dL / Nitrite: Negative   Leuk Esterase: Small / RBC: 12 /HPF / WBC 1 /HPF   Sq Epi: x / Non Sq Epi: 0 /HPF / Bacteria: Moderate      Troponin T, Serum: 0.03 ng/mL <HH> (19 @ 06:13)  Lactate, Blood: 2.7 mmol/L <H> (19 @ 20:34)  Lactate, Blood: 4.7 mmol/L <HH> (19 @ 12:40)      CARDIAC MARKERS ( 12 Sep 2019 06:13 )  x     / 0.03 ng/mL / x     / x     / x      CARDIAC MARKERS ( 11 Sep 2019 10:06 )  x     / 0.06 ng/mL / x     / x     / x          RADIOLOGY:  < from: Xray Chest 1 View-PORTABLE IMMEDIATE (19 @ 14:47) >  Impression:      No radiographic evidence of acute cardiopulmonary disease.    < end of copied text >    < from: CT Abdomen and Pelvis w/ Oral Cont (19 @ 13:49) >  IMPRESSION:   1.  Circumferential colonic wall thickening extending from the hepatic   flexure to the descending colon, compatible with colitis which may be of   infectious or inflammatory etiology.     2.  Mild left hydronephroureter to the level of the urinary bladder   without a definite obstructing calculi; nonobstructing 0.4 x 0.3 x 0.4 cm   calculus seen in the mildly dilated left renal pelvis.    < end of copied text >    PHYSICAL EXAM:  GEN: No acute distress  LUNGS: Clear to auscultation bilaterally   HEART: S1/S2 present. RRR.   ABD: Soft, non-tender, non-distended. Bowel sounds present  EXT: NC/NC/NE/2+PP/COOMBS  NEURO: AAOX3

## 2019-09-12 NOTE — CONSULT NOTE ADULT - ASSESSMENT

## 2019-09-12 NOTE — ED ADULT NURSE REASSESSMENT NOTE - NS ED NURSE REASSESS COMMENT FT1
pt @ baseline mental status, respirations easy and regular, skin is warm, dry, and normal, cardizem drip infusing at 5mg/hr, NS infusing at 75 ml/hr, pt is comfortable, cardiac monitor reads afib hr 80s, BP WNL
Received pt from Crit RN alert and orientedx3, for admission to telemetry due to high troponin, and lactate, and came symptoms of Diarrhea, stools sent was negetive for cdiff. Pt in bed, connected to monitor, to continue monitoring.

## 2019-09-12 NOTE — PHYSICAL THERAPY INITIAL EVALUATION ADULT - IMPAIRMENTS FOUND, PT EVAL
aerobic capacity/endurance/gross motor/gait, locomotion, and balance/ergonomics and body mechanics/muscle strength

## 2019-09-13 NOTE — PROGRESS NOTE ADULT - SUBJECTIVE AND OBJECTIVE BOX
HPI  Patient is a 89y old Female who presents with a chief complaint of Sepsis (13 Sep 2019 13:22)    Currently admitted to medicine with the primary diagnosis of Colitis     Today is hospital day 2d.     INTERVAL HPI / OVERNIGHT EVENTS:  Patient was examined and seen at bedside. This morning she is resting comfortably in bed and reports no new issues or overnight events.   Pt still with diarrhea    ROS: Otherwise unremarkable     PAST MEDICAL & SURGICAL HISTORY  Dyslipidemia  Hypertension  Atrial fibrillation  H/O abdominal hysterectomy  History of cholecystectomy    ALLERGIES  penicillin (Rash)    MEDICATIONS  STANDING MEDICATIONS  ALBUTerol    90 MICROgram(s) HFA Inhaler 1 Puff(s) Inhalation every 4 hours  ALBUTerol/ipratropium for Nebulization 3 milliLiter(s) Nebulizer every 6 hours  chlorhexidine 4% Liquid 1 Application(s) Topical <User Schedule>  diltiazem    Tablet 30 milliGRAM(s) Oral every 6 hours  meropenem  IVPB 500 milliGRAM(s) IV Intermittent every 12 hours  metoprolol tartrate 50 milliGRAM(s) Oral two times a day  metroNIDAZOLE  IVPB 500 milliGRAM(s) IV Intermittent every 8 hours  rivaroxaban 10 milliGRAM(s) Oral daily  tiotropium 18 MICROgram(s) Capsule 1 Capsule(s) Inhalation daily    PRN MEDICATIONS    VITALS:  T(F): 98.1  HR: 84  BP: 156/82  RR: 18  SpO2: 95%    PHYSICAL EXAM  GEN: NAD, Resting comfortably in bed  PULM: Clear to auscultation bilaterally, No wheezes  CVS: Regular rate and rhythm, S1-S2, no murmurs  ABD: Soft, tender, non-distended, no guarding  EXT: No edema  NEURO: AAOx3, no focal deficits    LABS                        12.5   20.37 )-----------( 103      ( 13 Sep 2019 07:06 )             38.1     09-13    144  |  113<H>  |  34<H>  ----------------------------<  93  3.9   |  19  |  1.0    Ca    8.1<L>      13 Sep 2019 07:06  Mg     2.0     09-13    TPro  5.9<L>  /  Alb  3.0<L>  /  TBili  0.3  /  DBili  x   /  AST  39  /  ALT  59<H>  /  AlkPhos  83  09-13              Culture - Stool (collected 11 Sep 2019 16:30)  Source: .Stool Feces  Preliminary Report (12 Sep 2019 21:43):    No enteric pathogens to date: Final culture pending    Culture - Urine (collected 11 Sep 2019 12:03)  Source: .Urine Clean Catch (Midstream)  Preliminary Report (13 Sep 2019 01:54):    >100,000 CFU/ml Gram Negative Rods    50,000 - 99,000 CFU/mL Gram Negative Rods #2    Culture - Blood (collected 11 Sep 2019 10:50)  Source: .Blood Blood-Peripheral  Gram Stain (12 Sep 2019 16:52):    Growth in aerobic bottle: Gram Negative Rods    Growth in anaerobic bottle: Gram Negative Rods  Preliminary Report (12 Sep 2019 16:52):    Growth in aerobic bottle: Gram Negative Rods    Growth in anaerobic bottle: Gram Negative Rods    Culture - Blood (collected 11 Sep 2019 10:50)  Source: .Blood Blood-Peripheral  Gram Stain (13 Sep 2019 10:41):    Growth in aerobic bottle: Gram Negative Rods    Growth in anaerobic bottle:    Gram Negative Rods  Preliminary Report (13 Sep 2019 10:42):    Growth in aerobic bottle: Gram Negative Rods    Growth in anaerobic bottle: Gram Negative Rods    "Due to technical problems, Proteus sp. will Not be reported as part of    the BCID panel until further notice" ***Blood Panel PCR results on this    specimenare available    approximately 3 hours after the Gram stain result.***    Gram stain, PCR, and/or culture results may not always    correspond due to difference in methodologies.    ************************************************************    This PCR assaywas performed using [employer].    The following targets are tested for: Enterococcus,    vancomycin resistant enterococci, Listeria monocytogenes,    coagulase negative staphylococci, S. aureus,    methicillin resistant S. aureus, Streptococcus agalactiae    (Group B), S. pneumoniae, S. pyogenes (Group A),    Acinetobacter baumannii, Enterobacter cloacae, E. coli,    Klebsiella oxytoca, K. pneumoniae, Proteus sp.,    Serratia marcescens, Haemophilus influenzae,    Neisseria meningitidis, Pseudomonas aeruginosa, Candida    albicans, C. glabrata, C krusei, C parapsilosis,    C. tropicalis and the KPC resistance gene.  Organism: Blood Culture PCR (12 Sep 2019 16:08)  Organism: Blood Culture PCR (12 Sep 2019 16:08)      CARDIAC MARKERS ( 12 Sep 2019 06:13 )  x     / 0.03 ng/mL / x     / x     / x          RADIOLOGY

## 2019-09-13 NOTE — PROGRESS NOTE ADULT - ASSESSMENT
89 year old female who presented with a cc/o weakness and diarrhea x2 days. She has a significant PMHx of HTN, HLD, and Paroxysmal Atrial Fibrillation (not on AC). She was brought in via EMS, called by home aid, after she was found in bed after an episode of diarrhea. She was also noted to be considerably weaker than her baseline. The diarrhea has occurred approximately 3-4 times daily for the last 2 days.     Severe Sepsis POA 2/2 Acute Colitis versus UTI/Pyelo with Mild L hydro  Still with diarrhea, hemodynamically stable, HR better controlled  Blood cultures positive for GNR, repeat another set  Fecal culture negative, Cdiff negative  c/w Meropenem for now, await sensitivities of Urine/Blood cx  monitor WBC/Fever curve  ID on BOard     Transaminitis, Acute kidney injury likely secondary to hypotension  improving, monitor  s/p IVF, encourage PO intake    Thrombocytopenia  improving, c/w Monitoring    Hx of Atrial Fibrillation  c/w cardizem and Xarelto    #Progress Note Handoff  Pending (specify):   Clinical improvement  Family discussion: Plan of care discussed with patient, aware and agreeable   Disposition:  unclear at this time

## 2019-09-13 NOTE — CONSULT NOTE ADULT - SUBJECTIVE AND OBJECTIVE BOX
STEPH DAI  89y, Female  Allergy: penicillin (Rash)      CHIEF COMPLAINT: Sepsis (12 Sep 2019 12:24)      HPI:  This is a 89 year old female who presented with a cc/o weakness and diarrhea x2 days. She has a significant PMHx of HTN, HLD, and Paroxysmal Atrial Fibrilaltion (not on AC). She was brought in via EMS, called by home aid, after she was found in bed after an episode of diarrhea. She was also noted to be considerably weaker than her baseline. The diarrhea has occurred approximately 3-4 times daily for the last 2 days. Per the aid, the diarrhea was watery and non-bloody.     In the ED, she was found to be hypotensive and tachycardia with leukocytosis and elevated lactate. CT A/P was consistent with colitis from the hepatic flexure to the descending colon. Labs revealed elevated troponin, LARS, and transaminitis likely secondary to the hypotension, which was fluid responsive in the ED. (11 Sep 2019 19:49)    FAMILY HISTORY:    PAST MEDICAL & SURGICAL HISTORY:  Dyslipidemia  Hypertension  Atrial fibrillation  H/O abdominal hysterectomy  History of cholecystectomy      Substance Use ( x ) never used  (  ) IVDU (  ) Other:  Tobacco Usage:  (   ) never smoked   ( x  ) former smoker   (   ) current smoker   Alcohol Usage: (   ) social  (   ) daily use (x   ) denies  Sexual History: NA      ROS  General: Denies fevers, chills, nightsweats, weight loss  HEENT: Denies headache, rhinorrhea, sore throat, eye pain  CV: Denies CP, palpitations  PULM: Denies SOB, cough  GI: Denies abdominal pain, diarrhea  : Denies dysuria, hematuria  MSK: Denies arthralgias  SKIN: Denies rash   NEURO: Denies paresthesias, weakness  PSYCH: Denies depression    VITALS:  T(F): 97.4, Max: 97.7 (19 @ 22:43)  HR: 83  BP: 169/97  RR: 18Vital Signs Last 24 Hrs  T(C): 36.3 (13 Sep 2019 05:31), Max: 36.5 (12 Sep 2019 22:43)  T(F): 97.4 (13 Sep 2019 05:31), Max: 97.7 (12 Sep 2019 22:43)  HR: 83 (13 Sep 2019 05:31) (76 - 140)  BP: 169/97 (13 Sep 2019 05:31) (120/80 - 169/97)  BP(mean): --  RR: 18 (13 Sep 2019 05:31) (18 - 20)  SpO2: 95% (12 Sep 2019 18:34) (95% - 99%)    PHYSICAL EXAM:  Gen: NAD, resting in bed  HEENT: Normocephalic, atraumatic  Neck: supple, no lymphadenopathy  CV: Regular rate & regular rhythm  Lungs: decreased BS at bases, no fremitus  Abdomen: Soft, BS present  Ext: Warm, well perfused  Neuro: non focal, awake  Skin: no rash, no erythema    TESTS & MEASUREMENTS:                        12.5   20.37 )-----------( 103      ( 13 Sep 2019 07:06 )             38.1         144  |  113<H>  |  34<H>  ----------------------------<  93  3.9   |  19  |  1.0    Ca    8.1<L>      13 Sep 2019 07:06  Phos  3.9       Mg     2.0         TPro  5.9<L>  /  Alb  3.0<L>  /  TBili  0.3  /  DBili  x   /  AST  39  /  ALT  59<H>  /  AlkPhos  83      eGFR if Non African American: 48 mL/min/1.73M2 (19 @ 07:06)  eGFR if African American: 55 mL/min/1.73M2 (19 @ 07:06)    LIVER FUNCTIONS - ( 13 Sep 2019 07:06 )  Alb: 3.0 g/dL / Pro: 5.9 g/dL / ALK PHOS: 83 U/L / ALT: 59 U/L / AST: 39 U/L / GGT: x           Urinalysis Basic - ( 11 Sep 2019 12:03 )    Color: Yellow / Appearance: Slightly Turbid / S.017 / pH: x  Gluc: x / Ketone: Negative  / Bili: Negative / Urobili: <2 mg/dL   Blood: x / Protein: 100 mg/dL / Nitrite: Negative   Leuk Esterase: Small / RBC: 12 /HPF / WBC 1 /HPF   Sq Epi: x / Non Sq Epi: 0 /HPF / Bacteria: Moderate        Culture - Stool (collected 19 @ 16:30)  Source: .Stool Feces  Preliminary Report (19 @ 21:43):    No enteric pathogens to date: Final culture pending    Culture - Urine (collected 19 @ 12:03)  Source: .Urine Clean Catch (Midstream)  Preliminary Report (19 @ 01:54):    >100,000 CFU/ml Gram Negative Rods    50,000 - 99,000 CFU/mL Gram Negative Rods #2    Culture - Blood (collected 19 @ 10:50)  Source: .Blood Blood-Peripheral  Gram Stain (19 @ 16:52):    Growth in aerobic bottle: Gram Negative Rods    Growth in anaerobic bottle: Gram Negative Rods  Preliminary Report (19 @ 16:52):    Growth in aerobic bottle: Gram Negative Rods    Growth in anaerobic bottle: Gram Negative Rods    Culture - Blood (collected 19 @ 10:50)  Source: .Blood Blood-Peripheral  Gram Stain (19 @ 14:31):    Growth in aerobic bottle: Gram Negative Rods  Preliminary Report (19 @ 14:31):    Growth in aerobic bottle: Gram Negative Rods    "Due to technical problems, Proteus sp. will Not be reported as part of    the BCID panel until further notice" ***Blood Panel PCR results on this    specimen are available    approximately 3 hours after the Gram stain result.***    Gram stain, PCR, and/or culture results may not always    correspond due to difference in methodologies.    ************************************************************    This PCR assay was performed using [employer].    The following targets are tested for: Enterococcus,    vancomycin resistant enterococci, Listeria monocytogenes,    coagulase negative staphylococci, S. aureus,    methicillin resistant S. aureus, Streptococcus agalactiae    (Group B), S. pneumoniae, S. pyogenes (Group A),    Acinetobacter baumannii, Enterobacter cloacae, E. coli,    Klebsiella oxytoca, K. pneumoniae, Proteus sp.,    Serratia marcescens, Haemophilus influenzae,    Neisseria meningitidis, Pseudomonas aeruginosa, Candida    albicans, C. glabrata, C krusei, C parapsilosis,    C. tropicalis and the KPC resistance gene.  Organism: Blood Culture PCR (19 @ 16:08)  Organism: Blood Culture PCR (19 @ 16:08)      -  Acinetobacter baumanii: Nondet      -  Candida albicans: Nondet      -  Candida glabrata: Nondet      -  Candida krusei: Nondet      -  Candida parapsilosis: Nondet      -  Candida tropicalis: Nondet      -  Coagulase negative Staphylococcus: Nondet      -  Enterobacter cloacae complex: Nondet      -  Enterococcus species: Nondet      -  Escherichia coli: Nondet      -  Haemophilus influenzae: Nondet      -  Klebsiella oxytoca: Nondet      -  Klebsiella pneumoniae: Nondet      -  Listeria monocytogenes: Nondet      -  Methicillin resistant Staphylococcus aureus (MRSA): Nondet      -  Multidrug (KPC pos) resistant organism: Nondet      -  Neisseria meningitidis: Nondet      -  Pseudomonas aeruginosa: Nondet      -  Serratia marcescens: Nondet      -  Staphylococcus aureus: Nondet      -  Streptococcus agalactiae (Group B): Nondet      -  Streptococcus pneumoniae: Nondet      -  Streptococcus pyogenes (Group A): Nondet      -  Streptococcus sp. (Not Grp A, B or S pneumoniae): Nondet      -  Vancomycin resistant Enterococcus sp.: Nondet      Method Type: PCR        Lactate, Blood: 2.7 mmol/L (19 @ 20:34)  Lactate, Blood: 4.7 mmol/L (19 @ 12:40)  Blood Gas Venous - Lactate: 4.2 mmoL/L (19 @ 12:06)  Blood Gas Venous - Lactate: 4.8 mmoL/L (19 @ 10:05)      INFECTIOUS DISEASES TESTING      RADIOLOGY & ADDITIONAL TESTS:  I have personally reviewed the last Chest xray  CXR  Xray Chest 1 View- PORTABLE-Urgent:   EXAM:  XR CHEST PORTABLE URGENT 1V            PROCEDURE DATE:  2019            INTERPRETATION:  Clinical History / Reason for exam: Shortness of breath    Comparison : Chest radiograph 2019.    Technique/Positioning: Single frontal view of the chest.    Findings:    Support devices: None.    Cardiac/mediastinum/hilum: Large hiatal hernia.    Lung parenchyma/Pleura: Within normal limits.    Skeleton/soft tissues: Grossly unchanged.    Impression:      No definite focal consolidation.    Large hiatal hernia.                  IBETH KERR M.D., ATTENDING RADIOLOGIST  This document has been electronically signed. Sep 12 2019  4:25PM             (19 @ 15:56)      CT      CARDIOLOGY TESTING  12 Lead ECG:   Ventricular Rate 128 BPM    Atrial Rate 141 BPM    QRS Duration 72 ms    Q-T Interval 332 ms    QTC Calculation(Bezet) 484 ms    R Axis 4 degrees    T Axis 23 degrees    Diagnosis Line Atrial fibrillation with rapid ventricular response  Low voltage QRS  Possible Inferior infarct , age undetermined  Abnormal ECG    Confirmed by Gautam Lopez ([id number]) on 2019 3:31:43 PM (19 @ 14:36)  12 Lead ECG:   Ventricular Rate 88 BPM    Atrial Rate 88 BPM    P-R Interval 190 ms    QRS Duration 74 ms    Q-T Interval 376 ms    QTC Calculation(Bezet) 454 ms    P Axis 40 degrees    R Axis 36 degrees    T Axis 22 degrees    Diagnosis Line Sinus rhythm with Premature atrial complexes  Otherwise normal ECG    Confirmed by Edward Childers ([id number]) on 2019 12:36:27 PM (19 @ 11:13)      MEDICATIONS  chlorhexidine 4% Liquid 1  diltiazem    Tablet 30  meropenem  IVPB 500  metoprolol tartrate 50  metroNIDAZOLE  IVPB 500  rivaroxaban 10  sodium chloride 0.9%. 1000      ANTIBIOTICS:  meropenem  IVPB 500 milliGRAM(s) IV Intermittent every 12 hours  metroNIDAZOLE  IVPB 500 milliGRAM(s) IV Intermittent every 8 hours      All available historical data has been reviewed

## 2019-09-13 NOTE — CONSULT NOTE ADULT - ASSESSMENT
ASSESSMENT  89y F admitted with COLITIS, SEPSIS, DUE TO UNSPECIFIED ORGANISM;LARS;TROPONIN      PROBLEMS  1. Severe Sepsis POA (T<96.8F, Pulse>90, WBC>12, >10% bands), lactic acidosis, metabolic encephalopathy, LARS due to Gram negative bacteremia (growing in both aerobic and anaerobic vial...thus not Pseudomonas)     R/O UTI     U/A Nitrite: Negative /Leuk Esterase: Small / RBC: 12 /HPF / WBC 1 /HPF  / Bacteria: Moderate    9/11 Urine C&S   >100,000 CFU/ml Gram Negative Rods    50,000 - 99,000 CFU/mL Gram Negative Rods #2      New problem with additional W/U  acute illness which poses threat to bodily function      On meropenem  IVPB 500 milliGRAM(s) IV Intermittent every 12 hours  metroNIDAZOLE  IVPB 500 milliGRAM(s) IV Intermittent every 8 hours    2. CT with colitis from the hepatic flexure to the descending colon.   C difficile toxin PCR negative  Stool C&S negative so far    3. Lactic acidosis    4. Resolving LARS    4. Thrombocytopenia        PLAN  Stool GI PCR  - Await blood cultures ID & susceptibilities of GNR (R/O Salmonella or Shigella)  Await urine culture  D/C Flagyl  Continue Meropenem for now to cover ESBL   - Repeat Blood Cultures x2  - Repeat  wbc

## 2019-09-13 NOTE — PROGRESS NOTE ADULT - ASSESSMENT
89 year old female who presented with a cc/o weakness and diarrhea x2 days. She has a significant PMHx of HTN, HLD, and Paroxysmal Atrial Fibrillation (not on AC). She was brought in via EMS, called by home aid, after she was found in bed after an episode of diarrhea. She was also noted to be considerably weaker than her baseline. The diarrhea has occurred approximately 3-4 times daily for the last 2 days.       Severe Sepsis 2/2 Acute Colitis  -  Sepsis on admission- Hypotensive with tachycardia & leukocytosis on presentation; No fever  - HD stable now, Normotensive. HR mild tachycardia.  - Lactate elevated on admission- s/p improved after IV hydration. Give NS bolus 500cc bolus now.   - CT A/P with Circumferential colonic wall thickening extending from the hepatic flexure to the descending colon, compatible with colitis which may be of infectious or inflammatory etiology  - C. difficile negative  - Continue antibiotics, CIpro/Flagyl  - Follow up cultures (Blood, stool), currently GNR  - monitor WBC, fever curve. Leukocytosis improving.   - repeating blood cx daily    # +UA  - asymptomatic  - f/u urine cx, currently gnr  - On cipro.     Transaminitis, Acute kidney injury likely secondary to hypotension  - Transaminitis improving, monitor  - LARS 2/2 hypotension and pre-renal likely 2/2 poor oral intake- improving  - restart diet, monitor serum Cr.     Elevated troponin  - Likely Type II NSTEMI 2/2 demand ischemia, no ACS  - EKG unremarkable and patient asymptomatic  - Troponin 0.06 > 0.03.    Thrombocytopenia  - Monitor    Mild left hydronephroureter; Nephrolithiasis  - Noted on CT A/P without a definite obstructing calculi  - non-obstructing 0.4 x 0.3 x 0.4 cm calculus seen in the mildly dilated left renal pelvis  - monitor serum Cr    Hx of Atrial Fibrillation  Hx of HTN and HLD        GI ppx:                                   [X] Not indicated    DVT ppx:  [X] Heparin      Activity:  [X] Increase as Tolerated    DISPO:  Patient to be discharged when condition(s) optimized.    CODE STATUS  [X] FULL

## 2019-09-13 NOTE — PROGRESS NOTE ADULT - SUBJECTIVE AND OBJECTIVE BOX
STEPH DAI  89y Female    CHIEF COMPLAINT:    Patient is a 89y old  Female who presents with a chief complaint of Sepsis (13 Sep 2019 08:45)      INTERVAL HPI/OVERNIGHT EVENTS:    Patient seen and examined. No acute events overnight. STill complaining of diarrhea    ROS: All other systems are negative.    Vital Signs:    T(F): 97.4 (09-13-19 @ 05:31), Max: 97.7 (09-12-19 @ 22:43)  HR: 82 (09-13-19 @ 10:56) (76 - 140)  BP: 154/85 (09-13-19 @ 10:56) (120/80 - 169/97)  RR: 18 (09-13-19 @ 05:31) (18 - 20)  SpO2: 95% (09-12-19 @ 18:34) (95% - 99%)  12 Sep 2019 07:01  -  13 Sep 2019 07:00  --------------------------------------------------------  IN: 0 mL / OUT: 1901 mL / NET: -1901 mL    PHYSICAL EXAM:    GENERAL:  NAD  SKIN: No rashes or lesions  HEENT: Atraumatic. Normocephalic.   NECK: Supple, No JVD.    PULMONARY: CTA B/L. No wheezing. No rales  CVS: Normal S1, S2. Rate and Rhythm are regular.  ABDOMEN/GI: Soft, Nontender, Nondistended  MSK:  No edema B/L LE. No clubbing or cyanosis  NEUROLOGIC:  No motor or sensory deficit.  PSYCH: Alert & oriented x 3, normal affect    Consultant(s) Notes Reviewed:  [x ] YES  [ ] NO  Care Discussed with Consultants/Other Providers [ x] YES  [ ] NO    LABS:                        12.5   20.37 )-----------( 103      ( 13 Sep 2019 07:06 )             38.1     144  |  113<H>  |  34<H>  ----------------------------<  93  3.9   |  19  |  1.0    Ca    8.1<L>      13 Sep 2019 07:06  Mg     2.0     09-13    TPro  5.9<L>  /  Alb  3.0<L>  /  TBili  0.3  /  DBili  x   /  AST  39  /  ALT  59<H>  /  AlkPhos  83  09-13    Trop 0.03, CKMB --, CK --, 09-12-19 @ 06:13  Trop 0.06, CKMB --, CK --, 09-11-19 @ 10:06    Culture - Stool (collected 11 Sep 2019 16:30)  Source: .Stool Feces  Preliminary Report (12 Sep 2019 21:43):    No enteric pathogens to date: Final culture pending    Culture - Urine (collected 11 Sep 2019 12:03)  Source: .Urine Clean Catch (Midstream)  Preliminary Report (13 Sep 2019 01:54):    >100,000 CFU/ml Gram Negative Rods    50,000 - 99,000 CFU/mL Gram Negative Rods #2    Culture - Blood (collected 11 Sep 2019 10:50)  Source: .Blood Blood-Peripheral  Gram Stain (12 Sep 2019 16:52):    Growth in aerobic bottle: Gram Negative Rods    Growth in anaerobic bottle: Gram Negative Rods  Preliminary Report (12 Sep 2019 16:52):    Growth in aerobic bottle: Gram Negative Rods    Growth in anaerobic bottle: Gram Negative Rods    Culture - Blood (collected 11 Sep 2019 10:50)  Source: .Blood Blood-Peripheral  Gram Stain (13 Sep 2019 10:41):    Growth in aerobic bottle: Gram Negative Rods    Growth in anaerobic bottle:    Gram Negative Rods  Preliminary Report (13 Sep 2019 10:42):    Growth in aerobic bottle: Gram Negative Rods    Growth in anaerobic bottle: Gram Negative Rods    "Due to technical problems, Proteus sp. will Not be reported as part of    the BCID panel until further notice" ***Blood Panel PCR results on this    specimenare available    approximately 3 hours after the Gram stain result.***    Gram stain, PCR, and/or culture results may not always    correspond due to difference in methodologies.    ************************************************************    This PCR assaywas performed using [email or web address].    The following targets are tested for: Enterococcus,    vancomycin resistant enterococci, Listeria monocytogenes,    coagulase negative staphylococci, S. aureus,    methicillin resistant S. aureus, Streptococcus agalactiae    (Group B), S. pneumoniae, S. pyogenes (Group A),    Acinetobacter baumannii, Enterobacter cloacae, E. coli,    Klebsiella oxytoca, K. pneumoniae, Proteus sp.,    Serratia marcescens, Haemophilus influenzae,    Neisseria meningitidis, Pseudomonas aeruginosa, Candida    albicans, C. glabrata, C krusei, C parapsilosis,    C. tropicalis and the KPC resistance gene.  Organism: Blood Culture PCR (12 Sep 2019 16:08)  Organism: Blood Culture PCR (12 Sep 2019 16:08)    RADIOLOGY & ADDITIONAL TESTS:    Imaging or report Personally Reviewed:  [x] YES  [ ] NO  EKG reviewed: [x] YES  [ ] NO    Medications:  Standing  ALBUTerol    90 MICROgram(s) HFA Inhaler 1 Puff(s) Inhalation every 4 hours  ALBUTerol/ipratropium for Nebulization 3 milliLiter(s) Nebulizer every 6 hours  chlorhexidine 4% Liquid 1 Application(s) Topical <User Schedule>  diltiazem    Tablet 30 milliGRAM(s) Oral every 6 hours  meropenem  IVPB 500 milliGRAM(s) IV Intermittent every 12 hours  metoprolol tartrate 50 milliGRAM(s) Oral two times a day  metroNIDAZOLE  IVPB 500 milliGRAM(s) IV Intermittent every 8 hours  rivaroxaban 10 milliGRAM(s) Oral daily  tiotropium 18 MICROgram(s) Capsule 1 Capsule(s) Inhalation daily    PRN Meds      Ninfa Merida MD  s. [id number]

## 2019-09-14 NOTE — PROGRESS NOTE ADULT - ASSESSMENT
ASSESSMENT  89y F admitted with COLITIS, SEPSIS, DUE TO UNSPECIFIED ORGANISM;LARS;TROPONIN    PROBLEMS  #Proteus bacteremia 2/2 pyelonephritis    9/11 BCX proteus    9/11 UCX proetus, <50k kleb    9/11 stool cx NG  #Severe Sepsis POA (T<96.8F, Pulse>90, WBC>12, >10% bands), lactic acidosis, metabolic encephalopathy, LARS  #CT with colitis from the hepatic flexure to the descending colon.     C difficile toxin PCR negative    Stool C&S negative so far  #Lactic acidosis  #Resolving LARS  #Thrombocytopenia      PLAN  - CHANGE abx to Ceftriaxone 1g q24h IV  - This note is not complete, all recommendations to follow. ASSESSMENT  89y F admitted with COLITIS, SEPSIS, DUE TO UNSPECIFIED ORGANISM;LARS;TROPONIN    PROBLEMS  #Proteus bacteremia 2/2 pyelonephritis    9/11 BCX proteus    9/11 UCX proetus, <50k kleb    9/11 stool cx NG  #Severe Sepsis POA (T<96.8F, Pulse>90, WBC>12, >10% bands), lactic acidosis, metabolic encephalopathy, LARS  #CT with colitis from the hepatic flexure to the descending colon.     C difficile toxin PCR negative    Stool C&S negative so far  #Lactic acidosis  #Resolving LARS  #Thrombocytopenia    clinically improving    PLAN  - CHANGE abx to Ceftriaxone 1g q24h IV  - when stable, d/c on keflex 500mg BID crcl 32

## 2019-09-14 NOTE — PROGRESS NOTE ADULT - SUBJECTIVE AND OBJECTIVE BOX
STEPH DAI  89y Female    CHIEF COMPLAINT:    Patient is a 89y old  Female who presents with a chief complaint of Sepsis (14 Sep 2019 13:29)    INTERVAL HPI/OVERNIGHT EVENTS:    Patient seen and examined. No acute events overnight. No further episodes of diarrhea    ROS: All other systems are negative.    Vital Signs:    T(F): 96.8 (09-14-19 @ 13:48), Max: 98 (09-14-19 @ 05:58)  HR: 103 (09-14-19 @ 13:48) (84 - 103)  BP: 136/89 (09-14-19 @ 13:48) (136/89 - 168/87)  RR: 20 (09-14-19 @ 13:48) (20 - 20)  SpO2: 96% (09-14-19 @ 13:48) (95% - 96%)    13 Sep 2019 07:01  -  14 Sep 2019 07:00  --------------------------------------------------------  IN: 730 mL / OUT: 1 mL / NET: 729 mL    PHYSICAL EXAM:    GENERAL:  NAD  SKIN: No rashes or lesions  HEENT: Atraumatic. Normocephalic.   NECK: Supple, No JVD.   PULMONARY: CTA B/L. No wheezing.   CVS: Normal S1, S2. Rate and Rhythm are regular.   ABDOMEN/GI: Soft, Nontender, Nondistended; BS present  MSK:  No edema B/L LE. No clubbing or cyanosis  NEUROLOGIC:  No motor or sensory deficit.  PSYCH: Alert & oriented x 3, normal affect    Consultant(s) Notes Reviewed:  [x ] YES  [ ] NO  Care Discussed with Consultants/Other Providers [ x] YES  [ ] NO    LABS:                        12.3   13.45 )-----------( 115      ( 14 Sep 2019 07:51 )             37.0     09-14    144  |  112<H>  |  25<H>  ----------------------------<  90  3.5   |  21  |  0.8    Ca    8.0<L>      14 Sep 2019 07:51  Mg     2.0     09-13    TPro  5.9<L>  /  Alb  3.0<L>  /  TBili  0.3  /  DBili  x   /  AST  39  /  ALT  59<H>  /  AlkPhos  83  09-13    Trop 0.03, CKMB --, CK --, 09-12-19 @ 06:13      Culture - Blood (collected 12 Sep 2019 21:05)  Source: .Blood Blood-Peripheral  Preliminary Report (14 Sep 2019 02:01):    No growth to date.    Culture - Blood (collected 12 Sep 2019 06:13)  Source: .Blood None  Preliminary Report (13 Sep 2019 17:01):    No growth to date.    Culture - Stool (collected 11 Sep 2019 16:30)  Source: .Stool Feces  Final Report (13 Sep 2019 20:02):    No enteric pathogens isolated.    (Stool culture examined for Salmonella,    Shigella, Campylobacter, Aeromonas, Plesiomonas,    Vibrio, E.coli O157 and Yersinia)    RADIOLOGY & ADDITIONAL TESTS:  Imaging or report Personally Reviewed:  [x] YES  [ ] NO  EKG reviewed: [x] YES  [ ] NO    Medications:  Standing  ALBUTerol    90 MICROgram(s) HFA Inhaler 1 Puff(s) Inhalation every 4 hours  ALBUTerol/ipratropium for Nebulization 3 milliLiter(s) Nebulizer every 6 hours  cefTRIAXone   IVPB 1000 milliGRAM(s) IV Intermittent every 24 hours  chlorhexidine 4% Liquid 1 Application(s) Topical <User Schedule>  diltiazem    Tablet 30 milliGRAM(s) Oral every 6 hours  meropenem  IVPB 500 milliGRAM(s) IV Intermittent every 12 hours  metoprolol tartrate 50 milliGRAM(s) Oral two times a day  rivaroxaban 10 milliGRAM(s) Oral daily  tiotropium 18 MICROgram(s) Capsule 1 Capsule(s) Inhalation daily    PRN Meds      Ninfa Merida MD  s. [id number]

## 2019-09-14 NOTE — PROGRESS NOTE ADULT - SUBJECTIVE AND OBJECTIVE BOX
HPI  Patient is a 89y old Female who presents with a chief complaint of Sepsis (14 Sep 2019 14:53)    Currently admitted to medicine with the primary diagnosis of Colitis     Today is hospital day 3d.     INTERVAL HPI / OVERNIGHT EVENTS:  Patient was examined and seen at bedside. This morning she is resting comfortably in bed and reports no new issues or overnight events.   Pt without diarrhea today    ROS: Otherwise unremarkable     PAST MEDICAL & SURGICAL HISTORY  Dyslipidemia  Hypertension  Atrial fibrillation  H/O abdominal hysterectomy  History of cholecystectomy    ALLERGIES  penicillin (Rash)    MEDICATIONS  STANDING MEDICATIONS  ALBUTerol    90 MICROgram(s) HFA Inhaler 1 Puff(s) Inhalation every 4 hours  ALBUTerol/ipratropium for Nebulization 3 milliLiter(s) Nebulizer every 6 hours  cefTRIAXone   IVPB 1000 milliGRAM(s) IV Intermittent every 24 hours  chlorhexidine 4% Liquid 1 Application(s) Topical <User Schedule>  diltiazem    Tablet 30 milliGRAM(s) Oral every 6 hours  metoprolol tartrate 50 milliGRAM(s) Oral two times a day  rivaroxaban 10 milliGRAM(s) Oral daily  tiotropium 18 MICROgram(s) Capsule 1 Capsule(s) Inhalation daily    PRN MEDICATIONS    VITALS:  T(F): 96.8  HR: 100  BP: 148/90  RR: 20  SpO2: 95%    PHYSICAL EXAM  GEN: NAD, Resting comfortably in bed  PULM: Clear to auscultation bilaterally, No wheezes  CVS: Regular rate and rhythm, S1-S2, no murmurs  ABD: Soft, non-tender, non-distended, no guarding  EXT: No edema  NEURO: AAOx3, no focal deficits    LABS                        12.3   13.45 )-----------( 115      ( 14 Sep 2019 07:51 )             37.0     09-14    144  |  112<H>  |  25<H>  ----------------------------<  90  3.5   |  21  |  0.8    Ca    8.0<L>      14 Sep 2019 07:51  Mg     2.0     09-13    TPro  5.9<L>  /  Alb  3.0<L>  /  TBili  0.3  /  DBili  x   /  AST  39  /  ALT  59<H>  /  AlkPhos  83  09-13              Culture - Blood (collected 12 Sep 2019 21:05)  Source: .Blood Blood-Peripheral  Preliminary Report (14 Sep 2019 02:01):    No growth to date.    Culture - Blood (collected 12 Sep 2019 06:13)  Source: .Blood None  Preliminary Report (13 Sep 2019 17:01):    No growth to date.          RADIOLOGY

## 2019-09-14 NOTE — PROGRESS NOTE ADULT - ASSESSMENT
89 year old female who presented with a cc/o weakness and diarrhea x2 days. She has a significant PMHx of HTN, HLD, and Paroxysmal Atrial Fibrillation (not on AC). She was brought in via EMS, called by home aid, after she was found in bed after an episode of diarrhea. She was also noted to be considerably weaker than her baseline. The diarrhea has occurred approximately 3-4 times daily for the last 2 days.     Severe Sepsis POA 2/2 Acute Colitis versus UTI/Pyelo with Mild L hydro  Diarrhea resolved, hemodynamically stable, HR better controlled  Blood cultures positive for Proteus 9/11, repeat NGTD 9/12  Fecal culture negative, Cdiff negative  ID on board, switch to Rocephin 1g Q24H, when stable, can switch to Keflex  monitor WBC/Fever curve  ID on BOard     Transaminitis, Acute kidney injury likely secondary to hypotension  improving, monitor  s/p IVF, encourage PO intake    Thrombocytopenia  improving, c/w Monitoring    Hx of Atrial Fibrillation  c/w cardizem and Xarelto    #Progress Note Handoff  Pending (specify):   Clinical improvement  Family discussion: Plan of care discussed with patient, aware and agreeable   Disposition:  SNF versus home with VNS

## 2019-09-14 NOTE — PROGRESS NOTE ADULT - ASSESSMENT
89 year old female who presented with a cc/o weakness and diarrhea x2 days. She has a significant PMHx of HTN, HLD, and Paroxysmal Atrial Fibrillation (not on AC). She was brought in via EMS, called by home aid, after she was found in bed after an episode of diarrhea. She was also noted to be considerably weaker than her baseline. The diarrhea has occurred approximately 3-4 times daily for the last 2 days.       Severe Sepsis 2/2 Acute Colitis  -  Sepsis on admission- Hypotensive with tachycardia & leukocytosis on presentation; No fever  - HD stable now, Normotensive. HR mild tachycardia.  - Lactate elevated on admission- s/p improved after IV hydration. Given NS bolus 500cc   - CT A/P with Circumferential colonic wall thickening extending from the hepatic flexure to the descending colon, compatible with colitis which may be of infectious or inflammatory etiology  - C. difficile negative  - Continue antibiotics, CIpro/Flagyl  - blood cx and urine cx - currently proteus and klebsiella  - stool cx - negative  - monitor WBC, fever curve. Leukocytosis improving.   - repeating blood cx daily  - On Meropenem and Ceftriaxone    # +UA  - asymptomatic  - On Meropenem and Ceftriaxone    Transaminitis, Acute kidney injury likely secondary to hypotension  - Transaminitis improving, monitor  - LARS 2/2 hypotension and pre-renal likely 2/2 poor oral intake- improving  - restart diet, monitor serum Cr.     Elevated troponin  - Likely Type II NSTEMI 2/2 demand ischemia, no ACS  - EKG unremarkable and patient asymptomatic  - Troponin 0.06 > 0.03.    Thrombocytopenia  - Monitor    Mild left hydronephroureter; Nephrolithiasis  - Noted on CT A/P without a definite obstructing calculi  - non-obstructing 0.4 x 0.3 x 0.4 cm calculus seen in the mildly dilated left renal pelvis  - monitor serum Cr    Hx of Atrial Fibrillation  Hx of HTN and HLD        GI ppx:                                   [X] Not indicated    DVT ppx:  [X] Heparin      Activity:  [X] Increase as Tolerated    DISPO:  Patient to be discharged when condition(s) optimized.    CODE STATUS  [X] FULL

## 2019-09-14 NOTE — PROGRESS NOTE ADULT - SUBJECTIVE AND OBJECTIVE BOX
NICHOFAWNIDALMISCHASTITYSTEPH  89y, Female  Allergy: penicillin (Rash)      CHIEF COMPLAINT: Sepsis (13 Sep 2019 15:44)      INTERVAL EVENTS/HPI  - No acute events overnight  - T(F): , Max: 98.1 (09-13-19 @ 14:24)  - Denies any worsening symptoms  - Tolerating medication  - WBC Count: 13.45 K/uL (09-14-19 @ 07:51)      ROS  General: Denies rigors, nightsweats  HEENT: Denies headache, rhinorrhea, sore throat, eye pain  CV: Denies CP, palpitations  PULM: Denies SOB, wheezing  GI: Denies hematemesis, hematochezia, melena  : Denies discharge, hematuria  MSK: Denies arthralgias, myalgias  SKIN: Denies rash, lesions  NEURO: Denies paresthesias, weakness  PSYCH: Denies depression, anxiety    VITALS:  T(F): 98, Max: 98.1 (09-13-19 @ 14:24)  HR: 88  BP: 150/90  RR: 20Vital Signs Last 24 Hrs  T(C): 36.7 (14 Sep 2019 05:58), Max: 36.7 (13 Sep 2019 14:24)  T(F): 98 (14 Sep 2019 05:58), Max: 98.1 (13 Sep 2019 14:24)  HR: 88 (14 Sep 2019 11:02) (84 - 88)  BP: 150/90 (14 Sep 2019 11:02) (150/90 - 168/87)  BP(mean): --  RR: 20 (14 Sep 2019 11:02) (18 - 20)  SpO2: 95% (14 Sep 2019 11:02) (95% - 96%)    PHYSICAL EXAM:  Gen: NAD, resting in bed  HEENT: Normocephalic, atraumatic  Neck: supple, no lymphadenopathy  CV: Regular rate & regular rhythm  Lungs: decreased BS at bases, no fremitus  Abdomen: Soft, BS present  Ext: Warm, well perfused  Neuro: non focal, awake  Skin: no rash, no erythema  Lines: no phlebitis    FH: Non-contributory  Social Hx: Non-contributory    TESTS & MEASUREMENTS:                        12.3   13.45 )-----------( 115      ( 14 Sep 2019 07:51 )             37.0     09-14    144  |  112<H>  |  25<H>  ----------------------------<  90  3.5   |  21  |  0.8    Ca    8.0<L>      14 Sep 2019 07:51  Mg     2.0     09-13    TPro  5.9<L>  /  Alb  3.0<L>  /  TBili  0.3  /  DBili  x   /  AST  39  /  ALT  59<H>  /  AlkPhos  83  09-13    eGFR if Non African American: 63 mL/min/1.73M2 (09-14-19 @ 07:51)  eGFR if : 73 mL/min/1.73M2 (09-14-19 @ 07:51)    LIVER FUNCTIONS - ( 13 Sep 2019 07:06 )  Alb: 3.0 g/dL / Pro: 5.9 g/dL / ALK PHOS: 83 U/L / ALT: 59 U/L / AST: 39 U/L / GGT: x               Culture - Blood (collected 09-12-19 @ 21:05)  Source: .Blood Blood-Peripheral  Preliminary Report (09-14-19 @ 02:01):    No growth to date.    Culture - Blood (collected 09-12-19 @ 06:13)  Source: .Blood None  Preliminary Report (09-13-19 @ 17:01):    No growth to date.    Culture - Stool (collected 09-11-19 @ 16:30)  Source: .Stool Feces  Final Report (09-13-19 @ 20:02):    No enteric pathogens isolated.    (Stool culture examined for Salmonella,    Shigella, Campylobacter, Aeromonas, Plesiomonas,    Vibrio, E.coli O157 and Yersinia)    Culture - Urine (collected 09-11-19 @ 12:03)  Source: .Urine Clean Catch (Midstream)  Final Report (09-13-19 @ 22:26):    >100,000 CFU/ml Proteus mirabilis    50,000 - 99,000 CFU/mL Klebsiella pneumoniae  Organism: Gram Negative Rods  Gram Negative Rods (09-13-19 @ 22:26)  Organism: Gram Negative Rods (09-13-19 @ 22:26)      -  Amikacin: S <=16      -  Ampicillin: S <=8 These ampicillin results predict results for amoxicillin      -  Ampicillin/Sulbactam: S <=8/4 Enterobacter, Citrobacter, and Serratia may develop resistance during prolonged therapy (3-4 days)      -  Aztreonam: S <=4      -  Cefazolin: S <=8 (MIC_CL_COM_ENTERIC_CEFAZU) For uncomplicated UTI with K. pneumoniae, E. coli, or P. mirablis: DEBORAH <=16 is sensitive and DEBORAH >=32 is resistant. This also predicts results for oral agents cefaclor, cefdinir, cefpodoxime, cefprozil, cefuroxime axetil, cephalexin and locarbef for uncomplicated UTI. Note that some isolates may be susceptible to these agents while testing resistant to cefazolin.      -  Cefepime: S <=4      -  Cefoxitin: S <=8      -  Ceftriaxone: S <=1 Enterobacter, Citrobacter, and Serratia may develop resistance during prolonged therapy      -  Ciprofloxacin: S <=1      -  Gentamicin: S <=4      -  Levofloxacin: S <=2      -  Meropenem: S <=1      -  Nitrofurantoin: R >64 Should not be used to treat pyelonephritis      -  Piperacillin/Tazobactam: S <=16      -  Tobramycin: S <=4      -  Trimethoprim/Sulfamethoxazole: S <=2/38      Method Type: DEBORAH  Organism: Gram Negative Rods (09-13-19 @ 22:26)      -  Amikacin: S <=16      -  Ampicillin: R >16 These ampicillin results predict results for amoxicillin      -  Ampicillin/Sulbactam: S <=8/4 Enterobacter, Citrobacter, and Serratia may develop resistance during prolonged therapy (3-4 days)      -  Aztreonam: S <=4      -  Cefazolin: S <=8 (MIC_CL_COM_ENTERIC_CEFAZU) For uncomplicated UTI with K. pneumoniae, E. coli, or P. mirablis: DEBORAH <=16 is sensitive and DEBORAH >=32 is resistant. This also predicts results for oral agents cefaclor, cefdinir, cefpodoxime, cefprozil, cefuroxime axetil, cephalexin and locarbef for uncomplicated UTI. Note that some isolates may be susceptible to these agents while testing resistant to cefazolin.      -  Cefepime: S <=4      -  Cefoxitin: S <=8      -  Ceftriaxone: S <=1 Enterobacter, Citrobacter, and Serratia may develop resistance during prolonged therapy      -  Ciprofloxacin: S <=1      -  Gentamicin: S <=4      -  Imipenem: S <=1      -  Levofloxacin: S <=2      -  Meropenem: S <=1      -  Nitrofurantoin: S <=32 Should not be used to treat pyelonephritis      -  Piperacillin/Tazobactam: S <=16      -  Tigecycline: S <=2      -  Tobramycin: S <=4      -  Trimethoprim/Sulfamethoxazole: S <=2/38      Method Type: DEBORAH    Culture - Blood (collected 09-11-19 @ 10:50)  Source: .Blood Blood-Peripheral  Gram Stain (09-12-19 @ 16:52):    Growth in aerobic bottle: Gram Negative Rods    Growth in anaerobic bottle: Gram Negative Rods  Preliminary Report (09-13-19 @ 19:20):    Growth in aerobic and anaerobic bottles: Proteus mirabilis    See previous culture [id number]    Culture - Blood (collected 09-11-19 @ 10:50)  Source: .Blood Blood-Peripheral  Gram Stain (09-13-19 @ 10:41):    Growth in aerobic bottle: Gram Negative Rods    Growth in anaerobic bottle:    Gram Negative Rods  Preliminary Report (09-13-19 @ 18:57):    Growth in aerobic bottle: Proteus mirabilis    Growth in anaerobic bottle: Gram Negative Rods    "Due to technical problems, Proteus sp. will Not be reported as part of    the BCID panel until further notice" ***Blood Panel PCR results on this    specimen are available    approximately 3 hours after the Gram stain result.***    Gram stain, PCR, and/or culture results may not always    correspond due to difference in methodologies.    ************************************************************    This PCR assay was performed using [facility] [employer].    The following targets are tested for: Enterococcus,    vancomycin resistant enterococci, Listeria monocytogenes,    coagulase negative staphylococci, S. aureus,    methicillin resistant S. aureus, Streptococcus agalactiae    (Group B), S. pneumoniae, S. pyogenes (Group A),    Acinetobacter baumannii, Enterobacter cloacae, E. coli,    Klebsiella oxytoca, K. pneumoniae, Proteus sp.,    Serratia marcescens, Haemophilus influenzae,    Neisseria meningitidis, Pseudomonas aeruginosa, Candida    albicans, C. glabrata, C krusei, C parapsilosis,    C. tropicalis and the KPC resistance gene.  Organism: Blood Culture PCR (09-12-19 @ 16:08)  Organism: Blood Culture PCR (09-12-19 @ 16:08)      -  Acinetobacter baumanii: Nondet      -  Candida albicans: Nondet      -  Candida glabrata: Nondet      -  Candida krusei: Nondet      -  Candida parapsilosis: Nondet      -  Candida tropicalis: Nondet      -  Coagulase negative Staphylococcus: Nondet      -  Enterobacter cloacae complex: Nondet      -  Enterococcus species: Nondet      -  Escherichia coli: Nondet      -  Haemophilus influenzae: Nondet      -  Klebsiella oxytoca: Nondet      -  Klebsiella pneumoniae: Nondet      -  Listeria monocytogenes: Nondet      -  Methicillin resistant Staphylococcus aureus (MRSA): Nondet      -  Multidrug (KPC pos) resistant organism: Nondet      -  Neisseria meningitidis: Nondet      -  Pseudomonas aeruginosa: Nondet      -  Serratia marcescens: Nondet      -  Staphylococcus aureus: Nondet      -  Streptococcus agalactiae (Group B): Nondet      -  Streptococcus pneumoniae: Nondet      -  Streptococcus pyogenes (Group A): Nondet      -  Streptococcus sp. (Not Grp A, B or S pneumoniae): Nondet      -  Vancomycin resistant Enterococcus sp.: Nondet      Method Type: PCR        Lactate, Blood: 2.7 mmol/L (09-11-19 @ 20:34)  Lactate, Blood: 4.7 mmol/L (09-11-19 @ 12:40)  Blood Gas Venous - Lactate: 4.2 mmoL/L (09-11-19 @ 12:06)  Blood Gas Venous - Lactate: 4.8 mmoL/L (09-11-19 @ 10:05)      INFECTIOUS DISEASES TESTING      RADIOLOGY & ADDITIONAL TESTS:  I have personally reviewed the last Chest xray  CXR  Xray Chest 1 View- PORTABLE-Urgent:   EXAM:  XR CHEST PORTABLE URGENT 1V            PROCEDURE DATE:  09/12/2019            INTERPRETATION:  Clinical History / Reason for exam: Shortness of breath    Comparison : Chest radiograph 9/11/2019.    Technique/Positioning: Single frontal view of the chest.    Findings:    Support devices: None.    Cardiac/mediastinum/hilum: Large hiatal hernia.    Lung parenchyma/Pleura: Within normal limits.    Skeleton/soft tissues: Grossly unchanged.    Impression:      No definite focal consolidation.    Large hiatal hernia.                  IBETH KERR M.D., ATTENDING RADIOLOGIST  This document has been electronically signed. Sep 12 2019  4:25PM             (09-12-19 @ 15:56)      CT      CARDIOLOGY TESTING  12 Lead ECG:   Ventricular Rate 128 BPM    Atrial Rate 141 BPM    QRS Duration 72 ms    Q-T Interval 332 ms    QTC Calculation(Bezet) 484 ms    R Axis 4 degrees    T Axis 23 degrees    Diagnosis Line Atrial fibrillation with rapid ventricular response  Low voltage QRS  Possible Inferior infarct , age undetermined  Abnormal ECG    Confirmed by Gautam Lopez ([id number]) on 9/12/2019 3:31:43 PM (09-12-19 @ 14:36)  12 Lead ECG:   Ventricular Rate 88 BPM    Atrial Rate 88 BPM    P-R Interval 190 ms    QRS Duration 74 ms    Q-T Interval 376 ms    QTC Calculation(Bezet) 454 ms    P Axis 40 degrees    R Axis 36 degrees    T Axis 22 degrees    Diagnosis Line Sinus rhythm with Premature atrial complexes  Otherwise normal ECG    Confirmed by Edward Childers ([id number]) on 9/11/2019 12:36:27 PM (09-11-19 @ 11:13)      MEDICATIONS  ALBUTerol    90 MICROgram(s) HFA Inhaler 1  ALBUTerol/ipratropium for Nebulization 3  chlorhexidine 4% Liquid 1  diltiazem    Tablet 30  meropenem  IVPB 500  metoprolol tartrate 50  rivaroxaban 10  tiotropium 18 MICROgram(s) Capsule 1      ANTIBIOTICS:  meropenem  IVPB 500 milliGRAM(s) IV Intermittent every 12 hours      All available historical records have been reviewed STEPH DAI  89y, Female  Allergy: penicillin (Rash)      CHIEF COMPLAINT: Sepsis (13 Sep 2019 15:44)      INTERVAL EVENTS/HPI  - No acute events overnight, denies diarrhea  - BCX proteus  - T(F): , Max: 98.1 (09-13-19 @ 14:24)  - Denies any worsening symptoms  - Tolerating medication  - WBC Count: 13.45 K/uL (09-14-19 @ 07:51)      ROS  General: Denies rigors, nightsweats  HEENT: Denies headache, rhinorrhea, sore throat, eye pain  CV: Denies CP, palpitations  PULM: Denies SOB, wheezing  GI: Denies hematemesis, hematochezia, melena  : Denies discharge, hematuria  MSK: Denies arthralgias, myalgias  SKIN: Denies rash, lesions  NEURO: Denies paresthesias, weakness  PSYCH: Denies depression, anxiety    VITALS:  T(F): 98, Max: 98.1 (09-13-19 @ 14:24)  HR: 88  BP: 150/90  RR: 20Vital Signs Last 24 Hrs  T(C): 36.7 (14 Sep 2019 05:58), Max: 36.7 (13 Sep 2019 14:24)  T(F): 98 (14 Sep 2019 05:58), Max: 98.1 (13 Sep 2019 14:24)  HR: 88 (14 Sep 2019 11:02) (84 - 88)  BP: 150/90 (14 Sep 2019 11:02) (150/90 - 168/87)  BP(mean): --  RR: 20 (14 Sep 2019 11:02) (18 - 20)  SpO2: 95% (14 Sep 2019 11:02) (95% - 96%)    PHYSICAL EXAM:  Gen: NAD, resting in bed  HEENT: Normocephalic, atraumatic  Neck: supple, no lymphadenopathy  CV: Regular rate & regular rhythm  Lungs: decreased BS at bases, no fremitus  Abdomen: Soft, BS present no suprapubic tenderness, no CVAT   Ext: Warm, well perfused  Neuro: non focal, awake  Skin: no rash, no erythema  Lines: no phlebitis    FH: Non-contributory  Social Hx: Non-contributory    TESTS & MEASUREMENTS:                        12.3   13.45 )-----------( 115      ( 14 Sep 2019 07:51 )             37.0     09-14    144  |  112<H>  |  25<H>  ----------------------------<  90  3.5   |  21  |  0.8    Ca    8.0<L>      14 Sep 2019 07:51  Mg     2.0     09-13    TPro  5.9<L>  /  Alb  3.0<L>  /  TBili  0.3  /  DBili  x   /  AST  39  /  ALT  59<H>  /  AlkPhos  83  09-13    eGFR if Non African American: 63 mL/min/1.73M2 (09-14-19 @ 07:51)  eGFR if : 73 mL/min/1.73M2 (09-14-19 @ 07:51)    LIVER FUNCTIONS - ( 13 Sep 2019 07:06 )  Alb: 3.0 g/dL / Pro: 5.9 g/dL / ALK PHOS: 83 U/L / ALT: 59 U/L / AST: 39 U/L / GGT: x               Culture - Blood (collected 09-12-19 @ 21:05)  Source: .Blood Blood-Peripheral  Preliminary Report (09-14-19 @ 02:01):    No growth to date.    Culture - Blood (collected 09-12-19 @ 06:13)  Source: .Blood None  Preliminary Report (09-13-19 @ 17:01):    No growth to date.    Culture - Stool (collected 09-11-19 @ 16:30)  Source: .Stool Feces  Final Report (09-13-19 @ 20:02):    No enteric pathogens isolated.    (Stool culture examined for Salmonella,    Shigella, Campylobacter, Aeromonas, Plesiomonas,    Vibrio, E.coli O157 and Yersinia)    Culture - Urine (collected 09-11-19 @ 12:03)  Source: .Urine Clean Catch (Midstream)  Final Report (09-13-19 @ 22:26):    >100,000 CFU/ml Proteus mirabilis    50,000 - 99,000 CFU/mL Klebsiella pneumoniae  Organism: Gram Negative Rods  Gram Negative Rods (09-13-19 @ 22:26)  Organism: Gram Negative Rods (09-13-19 @ 22:26)      -  Amikacin: S <=16      -  Ampicillin: S <=8 These ampicillin results predict results for amoxicillin      -  Ampicillin/Sulbactam: S <=8/4 Enterobacter, Citrobacter, and Serratia may develop resistance during prolonged therapy (3-4 days)      -  Aztreonam: S <=4      -  Cefazolin: S <=8 (MIC_CL_COM_ENTERIC_CEFAZU) For uncomplicated UTI with K. pneumoniae, E. coli, or P. mirablis: DEBORAH <=16 is sensitive and DEBORAH >=32 is resistant. This also predicts results for oral agents cefaclor, cefdinir, cefpodoxime, cefprozil, cefuroxime axetil, cephalexin and locarbef for uncomplicated UTI. Note that some isolates may be susceptible to these agents while testing resistant to cefazolin.      -  Cefepime: S <=4      -  Cefoxitin: S <=8      -  Ceftriaxone: S <=1 Enterobacter, Citrobacter, and Serratia may develop resistance during prolonged therapy      -  Ciprofloxacin: S <=1      -  Gentamicin: S <=4      -  Levofloxacin: S <=2      -  Meropenem: S <=1      -  Nitrofurantoin: R >64 Should not be used to treat pyelonephritis      -  Piperacillin/Tazobactam: S <=16      -  Tobramycin: S <=4      -  Trimethoprim/Sulfamethoxazole: S <=2/38      Method Type: DEBORAH  Organism: Gram Negative Rods (09-13-19 @ 22:26)      -  Amikacin: S <=16      -  Ampicillin: R >16 These ampicillin results predict results for amoxicillin      -  Ampicillin/Sulbactam: S <=8/4 Enterobacter, Citrobacter, and Serratia may develop resistance during prolonged therapy (3-4 days)      -  Aztreonam: S <=4      -  Cefazolin: S <=8 (MIC_CL_COM_ENTERIC_CEFAZU) For uncomplicated UTI with K. pneumoniae, E. coli, or P. mirablis: DEBORAH <=16 is sensitive and DEBORAH >=32 is resistant. This also predicts results for oral agents cefaclor, cefdinir, cefpodoxime, cefprozil, cefuroxime axetil, cephalexin and locarbef for uncomplicated UTI. Note that some isolates may be susceptible to these agents while testing resistant to cefazolin.      -  Cefepime: S <=4      -  Cefoxitin: S <=8      -  Ceftriaxone: S <=1 Enterobacter, Citrobacter, and Serratia may develop resistance during prolonged therapy      -  Ciprofloxacin: S <=1      -  Gentamicin: S <=4      -  Imipenem: S <=1      -  Levofloxacin: S <=2      -  Meropenem: S <=1      -  Nitrofurantoin: S <=32 Should not be used to treat pyelonephritis      -  Piperacillin/Tazobactam: S <=16      -  Tigecycline: S <=2      -  Tobramycin: S <=4      -  Trimethoprim/Sulfamethoxazole: S <=2/38      Method Type: DEBORAH    Culture - Blood (collected 09-11-19 @ 10:50)  Source: .Blood Blood-Peripheral  Gram Stain (09-12-19 @ 16:52):    Growth in aerobic bottle: Gram Negative Rods    Growth in anaerobic bottle: Gram Negative Rods  Preliminary Report (09-13-19 @ 19:20):    Growth in aerobic and anaerobic bottles: Proteus mirabilis    See previous culture [id number]    Culture - Blood (collected 09-11-19 @ 10:50)  Source: .Blood Blood-Peripheral  Gram Stain (09-13-19 @ 10:41):    Growth in aerobic bottle: Gram Negative Rods    Growth in anaerobic bottle:    Gram Negative Rods  Preliminary Report (09-13-19 @ 18:57):    Growth in aerobic bottle: Proteus mirabilis    Growth in anaerobic bottle: Gram Negative Rods    "Due to technical problems, Proteus sp. will Not be reported as part of    the BCID panel until further notice" ***Blood Panel PCR results on this    specimen are available    approximately 3 hours after the Gram stain result.***    Gram stain, PCR, and/or culture results may not always    correspond due to difference in methodologies.    ************************************************************    This PCR assay was performed using [employer].    The following targets are tested for: Enterococcus,    vancomycin resistant enterococci, Listeria monocytogenes,    coagulase negative staphylococci, S. aureus,    methicillin resistant S. aureus, Streptococcus agalactiae    (Group B), S. pneumoniae, S. pyogenes (Group A),    Acinetobacter baumannii, Enterobacter cloacae, E. coli,    Klebsiella oxytoca, K. pneumoniae, Proteus sp.,    Serratia marcescens, Haemophilus influenzae,    Neisseria meningitidis, Pseudomonas aeruginosa, Candida    albicans, C. glabrata, C krusei, C parapsilosis,    C. tropicalis and the KPC resistance gene.  Organism: Blood Culture PCR (09-12-19 @ 16:08)  Organism: Blood Culture PCR (09-12-19 @ 16:08)      -  Acinetobacter baumanii: Nondet      -  Candida albicans: Nondet      -  Candida glabrata: Nondet      -  Candida krusei: Nondet      -  Candida parapsilosis: Nondet      -  Candida tropicalis: Nondet      -  Coagulase negative Staphylococcus: Nondet      -  Enterobacter cloacae complex: Nondet      -  Enterococcus species: Nondet      -  Escherichia coli: Nondet      -  Haemophilus influenzae: Nondet      -  Klebsiella oxytoca: Nondet      -  Klebsiella pneumoniae: Nondet      -  Listeria monocytogenes: Nondet      -  Methicillin resistant Staphylococcus aureus (MRSA): Nondet      -  Multidrug (KPC pos) resistant organism: Nondet      -  Neisseria meningitidis: Nondet      -  Pseudomonas aeruginosa: Nondet      -  Serratia marcescens: Nondet      -  Staphylococcus aureus: Nondet      -  Streptococcus agalactiae (Group B): Nondet      -  Streptococcus pneumoniae: Nondet      -  Streptococcus pyogenes (Group A): Nondet      -  Streptococcus sp. (Not Grp A, B or S pneumoniae): Nondet      -  Vancomycin resistant Enterococcus sp.: Nondet      Method Type: PCR        Lactate, Blood: 2.7 mmol/L (09-11-19 @ 20:34)  Lactate, Blood: 4.7 mmol/L (09-11-19 @ 12:40)  Blood Gas Venous - Lactate: 4.2 mmoL/L (09-11-19 @ 12:06)  Blood Gas Venous - Lactate: 4.8 mmoL/L (09-11-19 @ 10:05)      INFECTIOUS DISEASES TESTING      RADIOLOGY & ADDITIONAL TESTS:  I have personally reviewed the last Chest xray  CXR  Xray Chest 1 View- PORTABLE-Urgent:   EXAM:  XR CHEST PORTABLE URGENT 1V            PROCEDURE DATE:  09/12/2019            INTERPRETATION:  Clinical History / Reason for exam: Shortness of breath    Comparison : Chest radiograph 9/11/2019.    Technique/Positioning: Single frontal view of the chest.    Findings:    Support devices: None.    Cardiac/mediastinum/hilum: Large hiatal hernia.    Lung parenchyma/Pleura: Within normal limits.    Skeleton/soft tissues: Grossly unchanged.    Impression:      No definite focal consolidation.    Large hiatal hernia.                  IBETH KERR M.D., ATTENDING RADIOLOGIST  This document has been electronically signed. Sep 12 2019  4:25PM             (09-12-19 @ 15:56)      CT      CARDIOLOGY TESTING  12 Lead ECG:   Ventricular Rate 128 BPM    Atrial Rate 141 BPM    QRS Duration 72 ms    Q-T Interval 332 ms    QTC Calculation(Bezet) 484 ms    R Axis 4 degrees    T Axis 23 degrees    Diagnosis Line Atrial fibrillation with rapid ventricular response  Low voltage QRS  Possible Inferior infarct , age undetermined  Abnormal ECG    Confirmed by Gautam Lopez ([id number]) on 9/12/2019 3:31:43 PM (09-12-19 @ 14:36)  12 Lead ECG:   Ventricular Rate 88 BPM    Atrial Rate 88 BPM    P-R Interval 190 ms    QRS Duration 74 ms    Q-T Interval 376 ms    QTC Calculation(Bezet) 454 ms    P Axis 40 degrees    R Axis 36 degrees    T Axis 22 degrees    Diagnosis Line Sinus rhythm with Premature atrial complexes  Otherwise normal ECG    Confirmed by Edward Childers ([id number]) on 9/11/2019 12:36:27 PM (09-11-19 @ 11:13)      MEDICATIONS  ALBUTerol    90 MICROgram(s) HFA Inhaler 1  ALBUTerol/ipratropium for Nebulization 3  chlorhexidine 4% Liquid 1  diltiazem    Tablet 30  meropenem  IVPB 500  metoprolol tartrate 50  rivaroxaban 10  tiotropium 18 MICROgram(s) Capsule 1      ANTIBIOTICS:  meropenem  IVPB 500 milliGRAM(s) IV Intermittent every 12 hours      All available historical records have been reviewed

## 2019-09-15 NOTE — PROGRESS NOTE ADULT - SUBJECTIVE AND OBJECTIVE BOX
NICHOFAWNIDALMIS STEPH  89y Female    CHIEF COMPLAINT:    Patient is a 89y old  Female who presents with a chief complaint of Sepsis (14 Sep 2019 19:10)      INTERVAL HPI/OVERNIGHT EVENTS:    Patient seen and examined. No acute events overnight. b/l crackles this AM    ROS: All other systems are negative.    Vital Signs:    T(F): 98.1 (09-15-19 @ 06:41), Max: 98.4 (09-14-19 @ 22:28)  HR: 99 (09-15-19 @ 06:41) (80 - 103)  BP: 160/80 (09-15-19 @ 06:41) (136/89 - 172/94)  RR: 20 (09-15-19 @ 06:41) (20 - 20)  SpO2: 95% (09-14-19 @ 17:53) (95% - 96%)    PHYSICAL EXAM:    GENERAL:  NAD  SKIN: No rashes or lesions  HEENT: Atraumatic. Normocephalic.   NECK: Supple, No JVD. No lymphadenopathy.  PULMONARY: b/l crackles. No rales  CVS: Normal S1, S2. Rate and Rhythm are regular.   ABDOMEN/GI: Soft, Nontender, Nondistended;   MSK:  No edema B/L LE. no clubbing or cyanosis  NEUROLOGIC:  No motor or sensory deficit.  PSYCH: Alert & oriented x 3, normal affect    Consultant(s) Notes Reviewed:  [x ] YES  [ ] NO  Care Discussed with Consultants/Other Providers [ x] YES  [ ] NO    LABS:                        12.4   8.18  )-----------( 148      ( 15 Sep 2019 07:32 )             37.5     09-15    143  |  110  |  22<H>  ----------------------------<  99  3.5   |  22  |  0.8    Ca    8.1<L>      15 Sep 2019 07:32  Culture - Blood (collected 13 Sep 2019 18:05)  Source: .Blood None  Preliminary Report (15 Sep 2019 02:04):    No growth to date.    Culture - Blood (collected 12 Sep 2019 21:05)  Source: .Blood Blood-Peripheral  Preliminary Report (14 Sep 2019 02:01):    No growth to date.    Culture - Urine (09.11.19 @ 12:03)    -  Ceftriaxone: S <=1 Enterobacter, Citrobacter, and Serratia may develop resistance during prolonged therapy    -  Ceftriaxone: S <=1 Enterobacter, Citrobacter, and Serratia may develop resistance during prolonged therapy    -  Levofloxacin: S <=2    -  Levofloxacin: S <=2    -  Meropenem: S <=1    -  Meropenem: S <=1    -  Nitrofurantoin: S <=32 Should not be used to treat pyelonephritis    -  Nitrofurantoin: R >64 Should not be used to treat pyelonephritis    -  Piperacillin/Tazobactam: S <=16    -  Piperacillin/Tazobactam: S <=16    -  Tigecycline: S <=2    -  Tobramycin: S <=4    -  Tobramycin: S <=4    -  Trimethoprim/Sulfamethoxazole: S <=2/38    -  Trimethoprim/Sulfamethoxazole: S <=2/38    -  Amikacin: S <=16    -  Amikacin: S <=16    -  Ampicillin: R >16 These ampicillin results predict results for amoxicillin    -  Ampicillin: S <=8 These ampicillin results predict results for amoxicillin    -  Ampicillin/Sulbactam: S <=8/4 Enterobacter, Citrobacter, and Serratia may develop resistance during prolonged therapy (3-4 days)    -  Ampicillin/Sulbactam: S <=8/4 Enterobacter, Citrobacter, and Serratia may develop resistance during prolonged therapy (3-4 days)    -  Aztreonam: S <=4    -  Aztreonam: S <=4    -  Cefazolin: S <=8 (MIC_CL_COM_ENTERIC_CEFAZU) For uncomplicated UTI with K. pneumoniae, E. coli, or P. mirablis: DEBORAH <=16 is sensitive and DEBORAH >=32 is resistant. This also predicts results for oral agents cefaclor, cefdinir, cefpodoxime, cefprozil, cefuroxime axetil, cephalexin and locarbef for uncomplicated UTI. Note that some isolates may be susceptible to these agents while testing resistant to cefazolin.    -  Cefazolin: S <=8 (MIC_CL_COM_ENTERIC_CEFAZU) For uncomplicated UTI with K. pneumoniae, E. coli, or P. mirablis: DEBORAH <=16 is sensitive and DEBOARH >=32 is resistant. This also predicts results for oral agents cefaclor, cefdinir, cefpodoxime, cefprozil, cefuroxime axetil, cephalexin and locarbef for uncomplicated UTI. Note that some isolates may be susceptible to these agents while testing resistant to cefazolin.    -  Cefepime: S <=4    -  Cefepime: S <=4    -  Cefoxitin: S <=8    -  Cefoxitin: S <=8    -  Ciprofloxacin: S <=1    -  Ciprofloxacin: S <=1    -  Gentamicin: S <=4    -  Gentamicin: S <=4    -  Imipenem: S <=1    Specimen Source: .Urine Clean Catch (Midstream)    Culture Results:   >100,000 CFU/ml Proteus mirabilis  50,000 - 99,000 CFU/mL Klebsiella pneumoniae    Organism Identification: Gram Negative Rods  Gram Negative Rods    Organism: Gram Negative Rods    Organism: Gram Negative Rods    Method Type: DEBORAH    Method Type: DEBORAH    RADIOLOGY & ADDITIONAL TESTS:  Imaging or report Personally Reviewed:  [x] YES  [ ] NO  EKG reviewed: [x] YES  [ ] NO    Medications:  Standing  ALBUTerol    90 MICROgram(s) HFA Inhaler 1 Puff(s) Inhalation every 4 hours  ALBUTerol/ipratropium for Nebulization 3 milliLiter(s) Nebulizer every 6 hours  cefTRIAXone   IVPB 1000 milliGRAM(s) IV Intermittent every 24 hours  chlorhexidine 4% Liquid 1 Application(s) Topical <User Schedule>  diltiazem    Tablet 30 milliGRAM(s) Oral every 6 hours  metoprolol tartrate 50 milliGRAM(s) Oral two times a day  rivaroxaban 10 milliGRAM(s) Oral daily  tiotropium 18 MICROgram(s) Capsule 1 Capsule(s) Inhalation daily    PRN Meds      Ninfa Merida MD  s. [phone]

## 2019-09-15 NOTE — PROGRESS NOTE ADULT - ASSESSMENT
89 year old female who presented with a cc/o weakness and diarrhea x2 days. She has a significant PMHx of HTN, HLD, and Paroxysmal Atrial Fibrillation (not on AC). She was brought in via EMS, called by home aid, after she was found in bed after an episode of diarrhea. She was also noted to be considerably weaker than her baseline. The diarrhea has occurred approximately 3-4 times daily for the last 2 days.     Severe Sepsis POA 2/2 Acute Colitis versus UTI/Pyelo with Mild L hydro  Diarrhea resolved, hemodynamically stable, HR better controlled  Urine cx with Prtoteus and Klebsiella  Blood cultures positive for Proteus 9/11, repeat NGTD 9/12  Fecal culture negative, Cdiff negative  ID on board, switch to Rocephin 1g Q24H  monitor WBC/Fever curve  If continues to improve, switch to keflex tomorrow    B/l Crackles on exam  likely due to fluid overload (received >2L IVF)  give lasix 40 IV x1 and will re-eval    Transaminitis, Acute kidney injury likely secondary to hypotension  improving, monitor  s/p IVF, encourage PO intake    Thrombocytopenia  improving, c/w Monitoring    Hx of Atrial Fibrillation  c/w cardizem and Xarelto    #Progress Note Handoff  Pending (specify):   Clinical improvement  Family discussion: Plan of care discussed with patient, aware and agreeable   Disposition: dc planning to SNF versus home with VNS

## 2019-09-16 NOTE — PROGRESS NOTE ADULT - SUBJECTIVE AND OBJECTIVE BOX
BIRDIDALMISSTEPH  89y, Female      OVERNIGHT EVENTS:    no fevers, NAD  no abdominal pain    VITALS:  T(F): 96.6, Max: 98.1 (09-15-19 @ 14:25)  HR: 94  BP: 136/89  RR: 20Vital Signs Last 24 Hrs  T(C): 35.9 (16 Sep 2019 05:23), Max: 36.7 (15 Sep 2019 14:25)  T(F): 96.6 (16 Sep 2019 05:23), Max: 98.1 (15 Sep 2019 14:25)  HR: 94 (16 Sep 2019 05:23) (76 - 100)  BP: 136/89 (16 Sep 2019 05:23) (136/89 - 151/87)  BP(mean): 113 (15 Sep 2019 14:25) (113 - 113)  RR: 20 (16 Sep 2019 05:23) (20 - 20)  SpO2: --    TESTS & MEASUREMENTS:                        13.0   8.22  )-----------( 171      ( 16 Sep 2019 05:49 )             39.1     09-16    143  |  105  |  19  ----------------------------<  111<H>  3.4<L>   |  25  |  0.9    Ca    8.4<L>      16 Sep 2019 05:49          Culture - Blood (collected 09-14-19 @ 07:51)  Source: .Blood None  Preliminary Report (09-15-19 @ 18:01):    No growth to date.    Culture - Blood (collected 09-13-19 @ 18:05)  Source: .Blood None  Preliminary Report (09-15-19 @ 02:04):    No growth to date.    Culture - Blood (collected 09-12-19 @ 21:05)  Source: .Blood Blood-Peripheral  Preliminary Report (09-14-19 @ 02:01):    No growth to date.    Culture - Blood (collected 09-12-19 @ 06:13)  Source: .Blood None  Preliminary Report (09-13-19 @ 17:01):    No growth to date.    Culture - Stool (collected 09-11-19 @ 16:30)  Source: .Stool Feces  Final Report (09-13-19 @ 20:02):    No enteric pathogens isolated.    (Stool culture examined for Salmonella,    Shigella, Campylobacter, Aeromonas, Plesiomonas,    Vibrio, E.coli O157 and Yersinia)    Culture - Urine (collected 09-11-19 @ 12:03)  Source: .Urine Clean Catch (Midstream)  Final Report (09-13-19 @ 22:26):    >100,000 CFU/ml Proteus mirabilis    50,000 - 99,000 CFU/mL Klebsiella pneumoniae  Organism: Gram Negative Rods  Gram Negative Rods (09-13-19 @ 22:26)  Organism: Gram Negative Rods (09-13-19 @ 22:26)      -  Amikacin: S <=16      -  Ampicillin: S <=8 These ampicillin results predict results for amoxicillin      -  Ampicillin/Sulbactam: S <=8/4 Enterobacter, Citrobacter, and Serratia may develop resistance during prolonged therapy (3-4 days)      -  Aztreonam: S <=4      -  Cefazolin: S <=8 (MIC_CL_COM_ENTERIC_CEFAZU) For uncomplicated UTI with K. pneumoniae, E. coli, or P. mirablis: DEBORAH <=16 is sensitive and DEBORAH >=32 is resistant. This also predicts results for oral agents cefaclor, cefdinir, cefpodoxime, cefprozil, cefuroxime axetil, cephalexin and locarbef for uncomplicated UTI. Note that some isolates may be susceptible to these agents while testing resistant to cefazolin.      -  Cefepime: S <=4      -  Cefoxitin: S <=8      -  Ceftriaxone: S <=1 Enterobacter, Citrobacter, and Serratia may develop resistance during prolonged therapy      -  Ciprofloxacin: S <=1      -  Gentamicin: S <=4      -  Levofloxacin: S <=2      -  Meropenem: S <=1      -  Nitrofurantoin: R >64 Should not be used to treat pyelonephritis      -  Piperacillin/Tazobactam: S <=16      -  Tobramycin: S <=4      -  Trimethoprim/Sulfamethoxazole: S <=2/38      Method Type: DEBORAH  Organism: Gram Negative Rods (09-13-19 @ 22:26)      -  Amikacin: S <=16      -  Ampicillin: R >16 These ampicillin results predict results for amoxicillin      -  Ampicillin/Sulbactam: S <=8/4 Enterobacter, Citrobacter, and Serratia may develop resistance during prolonged therapy (3-4 days)      -  Aztreonam: S <=4      -  Cefazolin: S <=8 (MIC_CL_COM_ENTERIC_CEFAZU) For uncomplicated UTI with K. pneumoniae, E. coli, or P. mirablis: DEBORAH <=16 is sensitive and DEBORAH >=32 is resistant. This also predicts results for oral agents cefaclor, cefdinir, cefpodoxime, cefprozil, cefuroxime axetil, cephalexin and locarbef for uncomplicated UTI. Note that some isolates may be susceptible to these agents while testing resistant to cefazolin.      -  Cefepime: S <=4      -  Cefoxitin: S <=8      -  Ceftriaxone: S <=1 Enterobacter, Citrobacter, and Serratia may develop resistance during prolonged therapy      -  Ciprofloxacin: S <=1      -  Gentamicin: S <=4      -  Imipenem: S <=1      -  Levofloxacin: S <=2      -  Meropenem: S <=1      -  Nitrofurantoin: S <=32 Should not be used to treat pyelonephritis      -  Piperacillin/Tazobactam: S <=16      -  Tigecycline: S <=2      -  Tobramycin: S <=4      -  Trimethoprim/Sulfamethoxazole: S <=2/38      Method Type: DEBORAH    Culture - Blood (collected 09-11-19 @ 10:50)  Source: .Blood Blood-Peripheral  Gram Stain (09-12-19 @ 16:52):    Growth in aerobic bottle: Gram Negative Rods    Growth in anaerobic bottle: Gram Negative Rods  Final Report (09-14-19 @ 14:10):    Growth in aerobic and anaerobic bottles: Proteus mirabilis    See previous culture [id number]    Culture - Blood (collected 09-11-19 @ 10:50)  Source: .Blood Blood-Peripheral  Gram Stain (09-13-19 @ 10:41):    Growth in aerobic bottle: Gram Negative Rods    Growth in anaerobic bottle:    Gram Negative Rods  Final Report (09-14-19 @ 13:48):    Growth in aerobic and anaerobic bottles: Proteus mirabilis    "Due to technical problems, Proteus sp. will Not be reported as part of    the BCID panel until further notice" ***Blood Panel PCR results on this    specimen are available    approximately 3 hours after the Gram stain result.***    Gram stain, PCR, and/or culture results may not always    correspond due to difference in methodologies.    ************************************************************    This PCR assay was performed using [employer].    The following targets are tested for: Enterococcus,    vancomycin resistant enterococci, Listeria monocytogenes,    coagulase negative staphylococci, S. aureus,    methicillin resistant S. aureus, Streptococcus agalactiae    (Group B), S. pneumoniae, S. pyogenes (Group A),    Acinetobacter baumannii, Enterobacter cloacae, E. coli,    Klebsiella oxytoca, K. pneumoniae, Proteus sp.,    Serratia marcescens, Haemophilus influenzae,    Neisseria meningitidis, Pseudomonas aeruginosa, Candida    albicans, C. glabrata, C krusei, C parapsilosis,    C. tropicalis and the KPC resistance gene.  Organism: Blood Culture PCR  Proteus mirabilis (09-14-19 @ 13:48)  Organism: Proteus mirabilis (09-14-19 @ 13:48)      -  Amikacin: S <=16      -  Ampicillin: S <=8 These ampicillin results predict results for amoxicillin      -  Ampicillin/Sulbactam: S <=4/2 Enterobacter, Citrobacter, and Serratia may develop resistance during prolonged therapy (3-4 days)      -  Aztreonam: S <=4      -  Cefazolin: S <=2 Enterobacter, Citrobacter, and Serratia may develop resistance during prolonged therapy (3-4 days)      -  Cefepime: S <=2      -  Cefoxitin: S <=8      -  Ceftriaxone: S <=1 Enterobacter, Citrobacter, and Serratia may develop resistance during prolonged therapy      -  Ciprofloxacin: S <=1      -  Ertapenem: S <=0.5      -  Gentamicin: S <=2      -  Levofloxacin: S <=2      -  Meropenem: S <=1      -  Piperacillin/Tazobactam: S <=8      -  Tobramycin: S <=2      -  Trimethoprim/Sulfamethoxazole: S <=2/38      Method Type: DEBORAH  Organism: Blood Culture PCR (09-14-19 @ 13:48)      -  Acinetobacter baumanii: Nondet      -  Candida albicans: Nondet      -  Candida glabrata: Nondet      -  Candida krusei: Nondet      -  Candida parapsilosis: Nondet      -  Candida tropicalis: Nondet      -  Coagulase negative Staphylococcus: Nondet      -  Enterobacter cloacae complex: Nondet      -  Enterococcus species: Nondet      -  Escherichia coli: Nondet      -  Haemophilus influenzae: Nondet      -  Klebsiella oxytoca: Nondet      -  Klebsiella pneumoniae: Nondet      -  Listeria monocytogenes: Nondet      -  Methicillin resistant Staphylococcus aureus (MRSA): Nondet      -  Multidrug (KPC pos) resistant organism: Nondet      -  Neisseria meningitidis: Nondet      -  Pseudomonas aeruginosa: Nondet      -  Serratia marcescens: Nondet      -  Staphylococcus aureus: Nondet      -  Streptococcus agalactiae (Group B): Nondet      -  Streptococcus pneumoniae: Nondet      -  Streptococcus pyogenes (Group A): Nondet      -  Streptococcus sp. (Not Grp A, B or S pneumoniae): Nondet      -  Vancomycin resistant Enterococcus sp.: Nondet      Method Type: PCR            RADIOLOGY & ADDITIONAL TESTS:    ANTIBIOTICS:  cefTRIAXone   IVPB 1000 milliGRAM(s) IV Intermittent every 24 hours

## 2019-09-16 NOTE — PROGRESS NOTE ADULT - SUBJECTIVE AND OBJECTIVE BOX
STEPH DAI  89y Female    CHIEF COMPLAINT:    Patient is a 89y old  Female who presents with a chief complaint of Sepsis (16 Sep 2019 09:18)      INTERVAL HPI/OVERNIGHT EVENTS:    Patient seen and examined. No acute events overnight. Clinically improved    ROS: All other systems are negative.    Vital Signs:    T(F): 97 (09-16-19 @ 13:36), Max: 98.1 (09-15-19 @ 14:25)  HR: 99 (09-16-19 @ 13:36) (76 - 99)  BP: 137/87 (09-16-19 @ 13:36) (136/89 - 151/87)  RR: 20 (09-16-19 @ 13:36) (20 - 20)    PHYSICAL EXAM:    GENERAL:  NAD  SKIN: No rashes or lesions  HEENT: Atraumatic. Normocephalic.   NECK: Supple, No JVD.  PULMONARY: CTA B/L. No wheezing. No rales  CVS: Normal S1, S2. Rate and Rhythm are regular.   ABDOMEN/GI: Soft, Nontender, Nondistended.  MSK:  No edema B/L LE. No clubbing or cyanosis  NEUROLOGIC:  No motor or sensory deficit.  PSYCH: Alert & oriented x 3, normal affect    Consultant(s) Notes Reviewed:  [x ] YES  [ ] NO  Care Discussed with Consultants/Other Providers [ x] YES  [ ] NO    LABS:                        13.0   8.22  )-----------( 171      ( 16 Sep 2019 05:49 )             39.1     09-16    143  |  105  |  19  ----------------------------<  111<H>  3.4<L>   |  25  |  0.9    Ca    8.4<L>      16 Sep 2019 05:49    Culture - Blood (collected 14 Sep 2019 07:51)  Source: .Blood None  Preliminary Report (15 Sep 2019 18:01):    No growth to date.    Culture - Blood (collected 13 Sep 2019 18:05)  Source: .Blood None  Preliminary Report (15 Sep 2019 02:04):    No growth to date.    RADIOLOGY & ADDITIONAL TESTS:    Medications:  Standing  ALBUTerol    90 MICROgram(s) HFA Inhaler 1 Puff(s) Inhalation every 4 hours  ALBUTerol/ipratropium for Nebulization 3 milliLiter(s) Nebulizer every 6 hours  cephalexin 500 milliGRAM(s) Oral every 12 hours  chlorhexidine 4% Liquid 1 Application(s) Topical <User Schedule>  diltiazem    Tablet 30 milliGRAM(s) Oral every 6 hours  metoprolol tartrate 50 milliGRAM(s) Oral two times a day  rivaroxaban 10 milliGRAM(s) Oral daily  tiotropium 18 MICROgram(s) Capsule 1 Capsule(s) Inhalation daily    PRN Meds      Ninfa Merida MD  s. [id number]

## 2019-09-16 NOTE — PROGRESS NOTE ADULT - ASSESSMENT
89 year old female who presented with a cc/o weakness and diarrhea x2 days. She has a significant PMHx of HTN, HLD, and Paroxysmal Atrial Fibrillation (not on AC). She was brought in via EMS, called by home aid, after she was found in bed after an episode of diarrhea. She was also noted to be considerably weaker than her baseline. The diarrhea has occurred approximately 3-4 times daily for the last 2 days.     Severe Sepsis POA 2/2 Acute Colitis versus UTI/Pyelo with Mild L hydro  Diarrhea resolved, hemodynamically stable, HR better controlled  Urine cx with Prtoteus and Klebsiella  Blood cultures positive for Proteus 9/11, repeat NGTD 9/12  Fecal culture negative, Cdiff negative  ID on board, can switch to Keflex for 14 more days  monitor WBC/Fever curve    B/l Crackles on exam - resolved  likely due to fluid overload (received >2L IVF)  resolved s/p Lasix 40 IVP    Hypokalemia  replete, check AM levels    Transaminitis, Acute kidney injury likely secondary to hypotension  improving, monitor  s/p IVF, encourage PO intake    Thrombocytopenia  resolved    Hx of Atrial Fibrillation  c/w cardizem and Xarelto    #Progress Note Handoff  Pending (specify):   Clinical improvement  Family discussion: Plan of care discussed with patient, aware and agreeable   Disposition: dc planning, anticipate 24-48 hours if continues to improve. Patient wants to go home, refuses SNF

## 2019-09-16 NOTE — PROGRESS NOTE ADULT - SUBJECTIVE AND OBJECTIVE BOX
HPI  Patient is a 89y old Female who presents with a chief complaint of Sepsis (16 Sep 2019 14:03)    Currently admitted to medicine with the primary diagnosis of Colitis     Today is hospital day 5d.     INTERVAL HPI / OVERNIGHT EVENTS:  Patient was examined and seen at bedside. This morning she is resting comfortably in bed and reports no new issues or overnight events.   No diarrhea or wheezing today    ROS: Otherwise unremarkable     PAST MEDICAL & SURGICAL HISTORY  Dyslipidemia  Hypertension  Atrial fibrillation  H/O abdominal hysterectomy  History of cholecystectomy    ALLERGIES  penicillin (Rash)    MEDICATIONS  STANDING MEDICATIONS  ALBUTerol    90 MICROgram(s) HFA Inhaler 1 Puff(s) Inhalation every 4 hours  ALBUTerol/ipratropium for Nebulization 3 milliLiter(s) Nebulizer every 6 hours  cephalexin 500 milliGRAM(s) Oral every 12 hours  chlorhexidine 4% Liquid 1 Application(s) Topical <User Schedule>  diltiazem    Tablet 30 milliGRAM(s) Oral every 6 hours  metoprolol tartrate 50 milliGRAM(s) Oral two times a day  rivaroxaban 10 milliGRAM(s) Oral daily  tiotropium 18 MICROgram(s) Capsule 1 Capsule(s) Inhalation daily    PRN MEDICATIONS    VITALS:  T(F): 97  HR: 99  BP: 137/87  RR: 20  SpO2: --    PHYSICAL EXAM  GEN: NAD, Resting comfortably in bed  PULM: Clear to auscultation bilaterally, No wheezes  CVS: Regular rate and rhythm, S1-S2, no murmurs  ABD: Soft, non-tender, non-distended, no guarding  EXT: No edema  NEURO: AAOx3, no focal deficits    LABS                        13.0   8.22  )-----------( 171      ( 16 Sep 2019 05:49 )             39.1     09-16    143  |  105  |  19  ----------------------------<  111<H>  3.4<L>   |  25  |  0.9    Ca    8.4<L>      16 Sep 2019 05:49                Culture - Blood (collected 14 Sep 2019 07:51)  Source: .Blood None  Preliminary Report (15 Sep 2019 18:01):    No growth to date.    Culture - Blood (collected 13 Sep 2019 18:05)  Source: .Blood None  Preliminary Report (15 Sep 2019 02:04):    No growth to date.          RADIOLOGY

## 2019-09-16 NOTE — PROGRESS NOTE ADULT - ASSESSMENT
ASSESSMENT  89y F admitted with COLITIS, SEPSIS, DUE TO UNSPECIFIED ORGANISM;LARS;TROPONIN    PROBLEMS  #resolved Proteus bacteremia 2/2 pyelonephritis    9/11 BCX proteus    9/12, 13 14 BCx NG    9/11 UCX proetus, <50k kleb    9/11 stool cx NG  #Severe Sepsis POA (T<96.8F, Pulse>90, WBC>12, >10% bands), lactic acidosis, metabolic encephalopathy, LARS  #CT with colitis from the hepatic flexure to the descending colon.     C difficile toxin PCR negative    Stool C&S negative so far  #Lactic acidosis  #Resolving LARS  #Thrombocytopenia    clinically improving/ stable    PLAN  - Ceftriaxone 1g q24h IV  - d/c on keflex 500mg BID  for 14 more days

## 2019-09-17 NOTE — DISCHARGE NOTE PROVIDER - HOSPITAL COURSE
This is a 89 year old female who presented with a cc/o weakness and diarrhea x2 days. She has a significant PMHx of HTN, HLD, and Paroxysmal Atrial Fibrilaltion (not on AC). She was brought in via EMS, called by home aid, after she was found in bed after an episode of diarrhea. She was also noted to be considerably weaker than her baseline. The diarrhea has occurred approximately 3-4 times daily for the last 2 days. Per the aid, the diarrhea was watery and non-bloody. In the ED, she was found to be hypotensive and tachycardia with leukocytosis and elevated lactate. CT A/P was consistent with colitis from the hepatic flexure to the descending colon. Labs revealed elevated troponin, LARS, and transaminitis likely secondary to the hypotension, which was fluid responsive in the ED. Pt was found to have UTI and sepsis with proteus and klebsiella pneumo. The patient was place on IV abx with major resolution of her symptoms. She is now to go home on PO keflex for the next 9 days.

## 2019-09-17 NOTE — DISCHARGE NOTE PROVIDER - PROVIDER TOKENS
FREE:[LAST:[Primary care physician],PHONE:[(   )    -],FAX:[(   )    -]],PROVIDER:[TOKEN:[[id number]]]

## 2019-09-17 NOTE — DISCHARGE NOTE PROVIDER - NSDCCPCAREPLAN_GEN_ALL_CORE_FT
PRINCIPAL DISCHARGE DIAGNOSIS  Diagnosis: Sepsis  Assessment and Plan of Treatment: You came in with a bloodstream infection.  Please continue your antibiotics for the next 9 days.  Please follow up with your primary care doctor.      SECONDARY DISCHARGE DIAGNOSES  Diagnosis: HTN (hypertension)  Assessment and Plan of Treatment: We changed your blood presure medications.  Please follow upw ith your primary care doctor in one week to review your blood pressure and medications.    Diagnosis: Bacterial UTI  Assessment and Plan of Treatment: Please continue your antibiotics for the next 9 days.  Please follow up with your primary care doctor.    Diagnosis: Colitis  Assessment and Plan of Treatment: You came in with Colitis seen on CT scan.   It is now resolved.  Please come back or follow up with your priomary doctor if your symptoms come back.

## 2019-09-17 NOTE — PROGRESS NOTE ADULT - ASSESSMENT
· Assessment		  ASSESSMENT  89y F admitted with COLITIS, SEPSIS, DUE TO UNSPECIFIED ORGANISM;LARS;TROPONIN    PROBLEMS  #resolved Proteus bacteremia 2/2 pyelonephritis    9/11 BCX proteus    9/12, 13 14 15  BCx NG    9/11 UCX proetus, <50k kleb    9/11 stool cx NG  #Severe Sepsis POA (T<96.8F, Pulse>90, WBC>12, >10% bands), lactic acidosis, metabolic encephalopathy, LARS  #CT with colitis from the hepatic flexure to the descending colon.     C difficile toxin PCR negative. No ongoing diarrhea    Stool C&S negative so far  #Resolving LARS  #Thrombocytopenia    clinically improving/ stable    PLAN  - Ceftriaxone 1g q24h IV Give the iv dose today  - frm 9/18 on keflex 500mg BID  for 14 days starting 9/12  -recall prn please

## 2019-09-17 NOTE — DISCHARGE NOTE NURSING/CASE MANAGEMENT/SOCIAL WORK - PATIENT PORTAL LINK FT
You can access the FollowMyHealth Patient Portal offered by [facility] by registering at the following website: [email or web address]. By joining [employer]’s FollowMyHealth portal, you will also be able to view your health information using other applications (apps) compatible with our system.

## 2019-09-17 NOTE — PROGRESS NOTE ADULT - PROVIDER SPECIALTY LIST ADULT
Infectious Disease
Internal Medicine

## 2019-09-17 NOTE — PROGRESS NOTE ADULT - SUBJECTIVE AND OBJECTIVE BOX
NICHOFAWNIDALMIS STEPH  89y, Female      OVERNIGHT EVENTS:    no fevers, comfortable with no complaints  no /GI complaints    VITALS:  T(F): 96.2, Max: 97 (09-16-19 @ 13:36)  HR: 89  BP: 141/76  RR: 18Vital Signs Last 24 Hrs  T(C): 35.7 (17 Sep 2019 05:34), Max: 36.1 (16 Sep 2019 13:36)  T(F): 96.2 (17 Sep 2019 05:34), Max: 97 (16 Sep 2019 13:36)  HR: 89 (17 Sep 2019 09:29) (66 - 99)  BP: 141/76 (17 Sep 2019 09:29) (137/87 - 171/85)  BP(mean): 100 (17 Sep 2019 09:29) (100 - 105)  RR: 18 (17 Sep 2019 05:34) (18 - 20)  SpO2: 96% (17 Sep 2019 05:34) (95% - 96%)    TESTS & MEASUREMENTS:                        12.9   8.78  )-----------( 187      ( 17 Sep 2019 07:36 )             39.1     09-17    142  |  104  |  16  ----------------------------<  107<H>  3.7   |  29  |  0.7    Ca    8.3<L>      17 Sep 2019 07:36          Culture - Blood (collected 09-15-19 @ 07:32)  Source: .Blood None  Preliminary Report (09-16-19 @ 19:00):    No growth to date.    Culture - Blood (collected 09-14-19 @ 07:51)  Source: .Blood None  Preliminary Report (09-15-19 @ 18:01):    No growth to date.    Culture - Blood (collected 09-13-19 @ 18:05)  Source: .Blood None  Preliminary Report (09-15-19 @ 02:04):    No growth to date.    Culture - Blood (collected 09-12-19 @ 21:05)  Source: .Blood Blood-Peripheral  Preliminary Report (09-14-19 @ 02:01):    No growth to date.    Culture - Blood (collected 09-12-19 @ 06:13)  Source: .Blood None  Preliminary Report (09-13-19 @ 17:01):    No growth to date.    Culture - Stool (collected 09-11-19 @ 16:30)  Source: .Stool Feces  Final Report (09-13-19 @ 20:02):    No enteric pathogens isolated.    (Stool culture examined for Salmonella,    Shigella, Campylobacter, Aeromonas, Plesiomonas,    Vibrio, E.coli O157 and Yersinia)    Culture - Urine (collected 09-11-19 @ 12:03)  Source: .Urine Clean Catch (Midstream)  Final Report (09-13-19 @ 22:26):    >100,000 CFU/ml Proteus mirabilis    50,000 - 99,000 CFU/mL Klebsiella pneumoniae  Organism: Gram Negative Rods  Gram Negative Rods (09-13-19 @ 22:26)  Organism: Gram Negative Rods (09-13-19 @ 22:26)      -  Amikacin: S <=16      -  Ampicillin: S <=8 These ampicillin results predict results for amoxicillin      -  Ampicillin/Sulbactam: S <=8/4 Enterobacter, Citrobacter, and Serratia may develop resistance during prolonged therapy (3-4 days)      -  Aztreonam: S <=4      -  Cefazolin: S <=8 (MIC_CL_COM_ENTERIC_CEFAZU) For uncomplicated UTI with K. pneumoniae, E. coli, or P. mirablis: DEBORAH <=16 is sensitive and DEBORAH >=32 is resistant. This also predicts results for oral agents cefaclor, cefdinir, cefpodoxime, cefprozil, cefuroxime axetil, cephalexin and locarbef for uncomplicated UTI. Note that some isolates may be susceptible to these agents while testing resistant to cefazolin.      -  Cefepime: S <=4      -  Cefoxitin: S <=8      -  Ceftriaxone: S <=1 Enterobacter, Citrobacter, and Serratia may develop resistance during prolonged therapy      -  Ciprofloxacin: S <=1      -  Gentamicin: S <=4      -  Levofloxacin: S <=2      -  Meropenem: S <=1      -  Nitrofurantoin: R >64 Should not be used to treat pyelonephritis      -  Piperacillin/Tazobactam: S <=16      -  Tobramycin: S <=4      -  Trimethoprim/Sulfamethoxazole: S <=2/38      Method Type: DEBORAH  Organism: Gram Negative Rods (09-13-19 @ 22:26)      -  Amikacin: S <=16      -  Ampicillin: R >16 These ampicillin results predict results for amoxicillin      -  Ampicillin/Sulbactam: S <=8/4 Enterobacter, Citrobacter, and Serratia may develop resistance during prolonged therapy (3-4 days)      -  Aztreonam: S <=4      -  Cefazolin: S <=8 (MIC_CL_COM_ENTERIC_CEFAZU) For uncomplicated UTI with K. pneumoniae, E. coli, or P. mirablis: DEBORAH <=16 is sensitive and DEBORAH >=32 is resistant. This also predicts results for oral agents cefaclor, cefdinir, cefpodoxime, cefprozil, cefuroxime axetil, cephalexin and locarbef for uncomplicated UTI. Note that some isolates may be susceptible to these agents while testing resistant to cefazolin.      -  Cefepime: S <=4      -  Cefoxitin: S <=8      -  Ceftriaxone: S <=1 Enterobacter, Citrobacter, and Serratia may develop resistance during prolonged therapy      -  Ciprofloxacin: S <=1      -  Gentamicin: S <=4      -  Imipenem: S <=1      -  Levofloxacin: S <=2      -  Meropenem: S <=1      -  Nitrofurantoin: S <=32 Should not be used to treat pyelonephritis      -  Piperacillin/Tazobactam: S <=16      -  Tigecycline: S <=2      -  Tobramycin: S <=4      -  Trimethoprim/Sulfamethoxazole: S <=2/38      Method Type: DEBORAH    Culture - Blood (collected 09-11-19 @ 10:50)  Source: .Blood Blood-Peripheral  Gram Stain (09-12-19 @ 16:52):    Growth in aerobic bottle: Gram Negative Rods    Growth in anaerobic bottle: Gram Negative Rods  Final Report (09-14-19 @ 14:10):    Growth in aerobic and anaerobic bottles: Proteus mirabilis    See previous culture [id number]    Culture - Blood (collected 09-11-19 @ 10:50)  Source: .Blood Blood-Peripheral  Gram Stain (09-13-19 @ 10:41):    Growth in aerobic bottle: Gram Negative Rods    Growth in anaerobic bottle:    Gram Negative Rods  Final Report (09-14-19 @ 13:48):    Growth in aerobic and anaerobic bottles: Proteus mirabilis    "Due to technical problems, Proteus sp. will Not be reported as part of    the BCID panel until further notice" ***Blood Panel PCR results on this    specimen are available    approximately 3 hours after the Gram stain result.***    Gram stain, PCR, and/or culture results may not always    correspond due to difference in methodologies.    ************************************************************    This PCR assay was performed using [employer].    The following targets are tested for: Enterococcus,    vancomycin resistant enterococci, Listeria monocytogenes,    coagulase negative staphylococci, S. aureus,    methicillin resistant S. aureus, Streptococcus agalactiae    (Group B), S. pneumoniae, S. pyogenes (Group A),    Acinetobacter baumannii, Enterobacter cloacae, E. coli,    Klebsiella oxytoca, K. pneumoniae, Proteus sp.,    Serratia marcescens, Haemophilus influenzae,    Neisseria meningitidis, Pseudomonas aeruginosa, Candida    albicans, C. glabrata, C krusei, C parapsilosis,    C. tropicalis and the KPC resistance gene.  Organism: Blood Culture PCR  Proteus mirabilis (09-14-19 @ 13:48)  Organism: Proteus mirabilis (09-14-19 @ 13:48)      -  Amikacin: S <=16      -  Ampicillin: S <=8 These ampicillin results predict results for amoxicillin      -  Ampicillin/Sulbactam: S <=4/2 Enterobacter, Citrobacter, and Serratia may develop resistance during prolonged therapy (3-4 days)      -  Aztreonam: S <=4      -  Cefazolin: S <=2 Enterobacter, Citrobacter, and Serratia may develop resistance during prolonged therapy (3-4 days)      -  Cefepime: S <=2      -  Cefoxitin: S <=8      -  Ceftriaxone: S <=1 Enterobacter, Citrobacter, and Serratia may develop resistance during prolonged therapy      -  Ciprofloxacin: S <=1      -  Ertapenem: S <=0.5      -  Gentamicin: S <=2      -  Levofloxacin: S <=2      -  Meropenem: S <=1      -  Piperacillin/Tazobactam: S <=8      -  Tobramycin: S <=2      -  Trimethoprim/Sulfamethoxazole: S <=2/38      Method Type: DEBORAH  Organism: Blood Culture PCR (09-14-19 @ 13:48)      -  Acinetobacter baumanii: Nondet      -  Candida albicans: Nondet      -  Candida glabrata: Nondet      -  Candida krusei: Nondet      -  Candida parapsilosis: Nondet      -  Candida tropicalis: Nondet      -  Coagulase negative Staphylococcus: Nondet      -  Enterobacter cloacae complex: Nondet      -  Enterococcus species: Nondet      -  Escherichia coli: Nondet      -  Haemophilus influenzae: Nondet      -  Klebsiella oxytoca: Nondet      -  Klebsiella pneumoniae: Nondet      -  Listeria monocytogenes: Nondet      -  Methicillin resistant Staphylococcus aureus (MRSA): Nondet      -  Multidrug (KPC pos) resistant organism: Nondet      -  Neisseria meningitidis: Nondet      -  Pseudomonas aeruginosa: Nondet      -  Serratia marcescens: Nondet      -  Staphylococcus aureus: Nondet      -  Streptococcus agalactiae (Group B): Nondet      -  Streptococcus pneumoniae: Nondet      -  Streptococcus pyogenes (Group A): Nondet      -  Streptococcus sp. (Not Grp A, B or S pneumoniae): Nondet      -  Vancomycin resistant Enterococcus sp.: Nondet      Method Type: PCR            RADIOLOGY & ADDITIONAL TESTS:    ANTIBIOTICS:  cephalexin 500 milliGRAM(s) Oral every 12 hours

## 2019-09-17 NOTE — PROGRESS NOTE ADULT - GASTROINTESTINAL DETAILS
no guarding/nontender/soft/no rebound tenderness/no rigidity
soft/nontender/no guarding/no rigidity/no rebound tenderness

## 2019-09-17 NOTE — DISCHARGE NOTE PROVIDER - CARE PROVIDER_API CALL
Primary care physician,   Phone: (   )    -  Fax: (   )    -  Follow Up Time:     Edward Childers (MD)  Cardiovascular Disease; Internal Medicine; Interventional Cardiology  [address] [place] [address]  Phone: [phone]  Fax: [phone]  Follow Up Time:

## 2019-09-18 PROBLEM — Z00.00 ENCOUNTER FOR PREVENTIVE HEALTH EXAMINATION: Status: ACTIVE | Noted: 2019-01-01

## 2019-10-04 PROBLEM — I48.11 LONGSTANDING PERSISTENT ATRIAL FIBRILLATION: Status: ACTIVE | Noted: 2019-01-01

## 2019-10-04 PROBLEM — I25.10 ASHD (ARTERIOSCLEROTIC HEART DISEASE): Status: RESOLVED | Noted: 2019-01-01 | Resolved: 2019-01-01

## 2019-10-04 NOTE — HISTORY OF PRESENT ILLNESS
[FreeTextEntry1] : pt is feeling better\par  no dyspnoea\par  no chest pains\par  meds reviewed\par

## 2019-10-04 NOTE — PHYSICAL EXAM
[Normal Appearance] : normal appearance [General Appearance - Well Developed] : well developed [Well Groomed] : well groomed [General Appearance - Well Nourished] : well nourished [No Deformities] : no deformities [General Appearance - In No Acute Distress] : no acute distress [Murmurs] : no murmurs present [Heart Sounds] : normal S1 and S2 [Edema] : no peripheral edema present [FreeTextEntry1] : mati correa

## 2019-10-04 NOTE — ASSESSMENT
[FreeTextEntry1] :  mert miller a fib with controlled heartrate 77 mnt\par  no chf\par  meds reviewed

## 2019-11-04 NOTE — ED PROVIDER NOTE - ATTENDING CONTRIBUTION TO CARE
I personally evaluated the patient. I reviewed the Resident’s or Physician Assistant’s note (as assigned above), and agree with the findings and plan except as documented in my note.   89 Y/O F HTN, DLD, PAF (NOT ON AC), ADMITTED 9/2019 WITH COLITIC, UTI, SEPSIS, LARS. PT NOW BROUGHT TO ED FROM HOME WITH LETHARGY AND CONFUSION AND POOR PO INTAKE. AS PER HHA, PT HAD BEEN C/O SORE THROAT. NO N/V. + DIARRHEA TODAY, SEVERAL EPISODES AT HOME AND WHILE IN THE ED TODAY. NO COUGH, SOB. AS PER HHA, PT NORMALLY CONVERSANT AND IS NOW NOT SPEAKING. VITALS NOTED. ALERT NOT ANSWERING QUESTIONS. CHRONICALLY ILL. NCAT PERRL. EOMI. OP NORMAL. NO EXUDATES OR ERYTHEMA.+ DRY MUCOUS MEMBRANES. NECK SUPPLE. LUNGS CLEAR B/L. RRR. S1S2. ABD- SOFT + LOWER ABD TENDERNESS. NO REBOUND OR GUARDING. NEURO EXAM NONFOCAL.,

## 2019-11-04 NOTE — ED PROVIDER NOTE - CARE PLAN
Principal Discharge DX:	LARS (acute kidney injury)  Secondary Diagnosis:	Sepsis  Secondary Diagnosis:	Diarrhea Principal Discharge DX:	Colitis  Secondary Diagnosis:	Sepsis  Secondary Diagnosis:	Diarrhea  Secondary Diagnosis:	LARS (acute kidney injury)

## 2019-11-04 NOTE — ED PROVIDER NOTE - PHYSICAL EXAMINATION
CONSTITUTIONAL: Well-developed; well-nourished; in no acute distress.   SKIN: warm, dry  HEAD: Normocephalic; atraumatic.  EYES: PERRL, EOMI, normal sclera and conjunctiva   ENT: No nasal discharge; airway clear.  NECK: Supple; non tender.  CARD: S1, S2 normal; no murmurs, gallops, or rubs. Regular rate and rhythm.   RESP: No wheezes, rales or rhonchi.  ABD: soft, nondistended. Diffuse ttp to light palpation. No rebound/guarding.   EXT: Normal ROM.  No clubbing, cyanosis or edema.   LYMPH: No acute cervical adenopathy.  NEURO: Alert, awake. Intermittently following commands. Moving all extremities. No facial droop or cranial nerve deficits.   PSYCH: Not conversing or speaking, but intermittently following commands.

## 2019-11-04 NOTE — ED PROVIDER NOTE - PROGRESS NOTE DETAILS
PATIENT SIGNED OUT TO DR. BRADLEY, FOLLOW UP LABS, CXR, REASSESS AND DISPO. Pt evaluated after signout. BP noted - 70s/40. IVF running. Pt is awake, dry, head NC/AT, lungs CTA B/L, CV S1S2 regular, abdomen soft/generalized tenderness/ND/(+)BS, ext (-) edema. Pt was on Abx 2 mo ago. ?c.diff. Will add c.diff, CT, and reevaluate. Spoke to Dr. Milan from ICU who agrees to admit the patient. Multiple attempts made to reach family, all phones on contact list called with no response. Spoke with Dr. Jorge Chairez recommended bicarb drip, will see pt in am. Pt on pressors. Abx and IVF given. Pt is more awake and alert. Follows command. Will admit to ICU.

## 2019-11-04 NOTE — ED ADULT NURSE REASSESSMENT NOTE - NS ED NURSE REASSESS COMMENT FT1
pt alert, awake, smiling and interacting with staff. Hypotensive, MD Castelan aware., IVF runnings, medications given as ordered.

## 2019-11-04 NOTE — ED PROVIDER NOTE - OBJECTIVE STATEMENT
89 y f pmh afib not on ac, htn, hld pw confusion, decreased po intake. Per aide, seen on Saturday able to converse and eating with  normal appetite. Today has had decreased appetite and frequent, watery bowel movements. Patient only complaining of throat pain. Denies fever, chills, sob, back pain, cp, vomiting, trauma.

## 2019-11-05 NOTE — H&P ADULT - NSHPLABSRESULTS_GEN_ALL_CORE
Labs:                        13.7   49.59 )-----------( 219      ( 2019 23:50 )             39.1                 132<L>  |  91<L>  |  98<HH>  ----------------------------<  223<H>  3.5   |  19  |  5.8<HH>    Ca    7.8<L>      2019 23:50  Mg     1.9         TPro  5.4<L>  /  Alb  2.4<L>  /  TBili  0.3  /  DBili  x   /  AST  18  /  ALT  7   /  AlkPhos  90      LIVER FUNCTIONS - ( 2019 23:50 )  Alb: 2.4 g/dL / Pro: 5.4 g/dL / ALK PHOS: 90 U/L / ALT: 7 U/L / AST: 18 U/L / GGT: x                 PT/INR - ( 2019 17:00 )   PT: 14.30 sec;   INR: 1.25 ratio         PTT - ( 2019 17:00 )  PTT:35.7 sec  CARDIAC MARKERS ( 2019 23:50 )  x     / 0.01 ng/mL / 240 U/L / x     / 7.0 ng/mL  CARDIAC MARKERS ( 2019 21:10 )  x     / 0.02 ng/mL / x     / x     / x        Urinalysis Basic - ( 2019 16:30 )    Color: Linda / Appearance: Slightly Turbid / S.023 / pH: x  Gluc: x / Ketone: Negative  / Bili: Small / Urobili: <2 mg/dL   Blood: x / Protein: 30 mg/dL / Nitrite: Negative   Leuk Esterase: Negative / RBC: 6 /HPF / WBC 10 /HPF   Sq Epi: x / Non Sq Epi: 11 /HPF / Bacteria: Negative      Lactate, Blood: 3.1 mmol/L (19 @ 17:00)  Lactate, Blood: 1.3 mmol/L (. @ 23:50)    Imaging:  < from: CT Abdomen and Pelvis No Cont (19 @ 20:49) >    IMPRESSION:       Diffuse circumferential thickening of the colon with mild pericolonic   inflammation, compatible with pancolitis. No intraperitoneal free air or   drainable collection.    < end of copied text >      ECG:< from: 12 Lead ECG (19 @ 20:52) >    QTC Calculation(Bezet) 465 ms    R Axis -10 degrees    T Axis 0 degrees    Diagnosis Line Atrial fibrillation  Inferior infarct , age undetermined  Abnormal ECG    Confirmed by REENA REYNA MD ([id number]) on 2019 10:38:30 PM    < end of copied text >

## 2019-11-05 NOTE — CONSULT NOTE ADULT - ASSESSMENT
IMPRESSION: Septic shock/ pancolitis/ highly C diff/ renal failure on levophed/ bicarb drip/ multiple co morbidities      PLAN:    CNS: avoid CNS depressant    HEENT:  Oral care    PULMONARY:  HOB @ 45 degrees, aspiration precaution, repeat CXR    CARDIOVASCULAR: keep bicarb drip, CE    GI: GI prophylaxis                                          Feeding  clear, GI eval    RENAL:  F/u  lytes.  Correct as needed. accurate I/O, mcgregor catheter, bolus IVF if needed, repeat CMP    INFECTIOUS DISEASE: po vanco/ flagyl, pancx, send stool for c diff    HEMATOLOGICAL:  DVT prophylaxis.    ENDOCRINE:  Follow up FS.  Insulin protocol if needed.    CODE STATUS: DNR DNI    DISPOSITION: Patient will not benefit from ICU care, admit to floor/ palliative care eval

## 2019-11-05 NOTE — CONSULT NOTE ADULT - ATTENDING COMMENTS
I was Physically Present for the key portions of the evaluation   I agree with the above History  , Physical examination Assessment and plan   I have Reviewed , Modified or appended where appropriate.  Please check A and P as above   1- LARS / acidosis / pancolitis  probably prerenal improving with IVF   ok to cont  switch to LR at 100 cc per hour  follow BMP  Keep diuretics on hold  on cipro/ flagyl for now

## 2019-11-05 NOTE — H&P ADULT - NSHPSOCIALHISTORY_GEN_ALL_CORE
per previous records: Ambulates with walker, No EtOH and illicit drug use  Former smoker; quit in 1970s; 30 pack year history

## 2019-11-05 NOTE — CONSULT NOTE ADULT - ASSESSMENT
89 y/o F with LARS in hospital for AMS, decreased appetite, frequent watery bowel movements.  PMH HTN, Afib, DLD, s/p hysterectomy, s/p cholecystectomy.    # LARS : baseline cr. ~ 0.7 in 09/2019.   - Likely prerenal due to decreased appetite plus diarrhea.   - serum creatinine improving since admission   - please document urine output.   - Strict I/O   - 89 y/o F with LARS in hospital for AMS, decreased appetite, frequent watery bowel movements.  PMH HTN, Afib, DLD, s/p hysterectomy, s/p cholecystectomy.    # LARS : baseline cr. ~ 0.7 in 09/2019.   - Likely prerenal due to decreased appetite plus diarrhea.   - serum creatinine improving since admission   - please document urine output.   - Strict I/O   - switch IV hydration to NS   - check ABG for acid base disturbance   - check urine creatinine, Na, Urea, Pr/Cr ratio   - encourage oral intake   - Monitor closely for volume overload   - on levophed, BP noted, keep MAP > 65   - sono noted, no hydro   - corrected Ca, Mg at goal.   - CT abdomen noted for pancolitis   - on Cipro and flagyl   - GI eval   - repeat BMP, trend creatinine   - palliative care eval   - no acute indication for RRT at the moment.   - with advanced age and baseline status, would recommend against RRT if indicated.   - avoid nephrotoxins and hypotension   - will follow

## 2019-11-05 NOTE — H&P ADULT - NSHPPHYSICALEXAM_GEN_ALL_CORE
Vital Signs Last 24 Hrs  T(C): 37.4 (04 Nov 2019 16:30), Max: 37.4 (04 Nov 2019 16:30)  T(F): 99.4 (04 Nov 2019 16:30), Max: 99.4 (04 Nov 2019 16:30)  HR: 95 (05 Nov 2019 03:00) (67 - 107)  BP: 137/78 (05 Nov 2019 03:00) (74/41 - 137/78)  BP(mean): 101 (05 Nov 2019 03:00) (62 - 101)  RR: 16 (05 Nov 2019 03:00) (16 - 18)  SpO2: 99% (05 Nov 2019 03:00) (95% - 100%)    GENERAL: no respiratory distress  HEAD:  Atraumatic, Normocephalic  EYES: EOMI, non-icteric, no injected sclera  NECK: Supple, No JVD  CHEST/LUNG: Clear to auscultation bilaterally; No wheeze; No crackles;    HEART: Regular rate and rhythm; No murmurs;   ABDOMEN: Soft, Nontender, Nondistended; Bowel sounds present;    EXTREMITIES:  no edema  PSYCH: AAOx2 (not oriented to time)  NEUROLOGY: non focal motor deficit

## 2019-11-05 NOTE — H&P ADULT - NSHPPOADEEPVENOUSTHROMB_GEN_A_CORE
REFILL LOVENOX, METOPROLOL, LOSARTAN, POTASSIUM CALLED TO [facility]YAL.  SHE WOULD LIKE A CALL TO DISCUSS QUESTIONS ABOUT IF SHE HAD ANY MEDICATION CHANGES [phone]   no

## 2019-11-05 NOTE — CONSULT NOTE ADULT - SUBJECTIVE AND OBJECTIVE BOX
Patient is a 89y old  Female who presents with a chief complaint of Altered mental status (2019 00:06)      HPI:  89 year old lady DNI/DNR (per previous records), with history of afib, hypertension and DLD was brought in for altered mental status  as per ED note, the aid reported decreased appetite and frequent, watery bowel movements. the patient denies abdominal pain, fever, dyspnea, headache  emergency contact, the daughter in law reported that there is no relationship with the patient and that her daughter and granddaughter usually visit her. she lives in a senior home and has aid that visits her regularly. I tried calling the daughter, no answer, left a voice message.  to note that the patient was recently admitted, sep 2019, for severe sepsis 2/2 colitis.    in the ED she was hypotensive 74/41 and tachycardic, no documented fever  WBC=43K, lac acid=3.1  CT showed pan colitis (2019 00:06), was given IVF, started on levophed, called for MICU, this am looks comfortable, BM overnight      PAST MEDICAL & SURGICAL HISTORY:  Dyslipidemia  Hypertension  Atrial fibrillation  H/O abdominal hysterectomy  History of cholecystectomy      SOCIAL HX:   Smoking -    FAMILY HISTORY:  No pertinent family history in first degree relatives      REVIEW OF SYSTEMS see hpi      Allergies    penicillin (Rash)    Intolerances        chlorhexidine 4% Liquid 1 Application(s) Topical daily  ciprofloxacin     Tablet 250 milliGRAM(s) Oral daily  heparin  Injectable 5000 Unit(s) SubCutaneous every 12 hours  metroNIDAZOLE  IVPB 500 milliGRAM(s) IV Intermittent every 8 hours  norepinephrine Infusion 0.05 MICROgram(s)/kG/Min IV Continuous <Continuous>  sodium bicarbonate  Infusion 0.45 mEq/kG/Hr IV Continuous <Continuous>  : Home Meds:      PHYSICAL EXAM    ICU Vital Signs Last 24 Hrs  T(C): 36.8 (2019 04:00), Max: 37.4 (2019 16:30)  T(F): 98.2 (2019 04:00), Max: 99.4 (2019 16:30)  HR: 90 (2019 05:00) (67 - 107)  BP: 100/76 (2019 05:00) (74/41 - 137/78)  BP(mean): 83 (2019 05:00) (62 - 101)  RR: 18 (2019 05:00) (16 - 18)  SpO2: 95% (2019 05:00) (95% - 100%)      General: dry oral mucosa  HEENT:  ELANA              Lymph Nodes: No cervical LN   Lungs: Bilateral BS, dec bs both bases  Cardiovascular: Regular, NARENDRA 3/6  Abdomen: Soft, Positive BS, diffuse tenderness  Extremities: No clubbing  Skin: Warm  Neurological: Non focal         LABS:                          13.7   49.59 )-----------( 219      ( 2019 23:50 )             39.1                                               11    132<L>  |  91<L>  |  98<HH>  ----------------------------<  223<H>  3.5   |  19  |  5.8<HH>    Ca    7.8<L>      2019 23:50  Mg     1.9         TPro  5.4<L>  /  Alb  2.4<L>  /  TBili  0.3  /  DBili  x   /  AST  18  /  ALT  7   /  AlkPhos  90        PT/INR - ( 2019 17:00 )   PT: 14.30 sec;   INR: 1.25 ratio         PTT - ( 2019 17:00 )  PTT:35.7 sec                                       Urinalysis Basic - ( 2019 16:30 )    Color: Linda / Appearance: Slightly Turbid / S.023 / pH: x  Gluc: x / Ketone: Negative  / Bili: Small / Urobili: <2 mg/dL   Blood: x / Protein: 30 mg/dL / Nitrite: Negative   Leuk Esterase: Negative / RBC: 6 /HPF / WBC 10 /HPF   Sq Epi: x / Non Sq Epi: 11 /HPF / Bacteria: Negative        CARDIAC MARKERS ( 2019 23:50 )  x     / 0.01 ng/mL / 240 U/L / x     / 7.0 ng/mL  CARDIAC MARKERS ( 2019 21:10 )  x     / 0.02 ng/mL / x     / x     / x                                                LIVER FUNCTIONS - ( 2019 23:50 )  Alb: 2.4 g/dL / Pro: 5.4 g/dL / ALK PHOS: 90 U/L / ALT: 7 U/L / AST: 18 U/L / GGT: x                                                                                                                                       X-Rays / CT A/P reviewed    MEDICATIONS  (STANDING):  chlorhexidine 4% Liquid 1 Application(s) Topical daily  ciprofloxacin     Tablet 250 milliGRAM(s) Oral daily  heparin  Injectable 5000 Unit(s) SubCutaneous every 12 hours  metroNIDAZOLE  IVPB 500 milliGRAM(s) IV Intermittent every 8 hours  norepinephrine Infusion 0.05 MICROgram(s)/kG/Min (4.688 mL/Hr) IV Continuous <Continuous>  sodium bicarbonate  Infusion 0.45 mEq/kG/Hr (150 mL/Hr) IV Continuous <Continuous>    MEDICATIONS  (PRN):

## 2019-11-05 NOTE — H&P ADULT - HISTORY OF PRESENT ILLNESS
NOTE INCOMPLETE   89 year old lady pmh afib not on ac, htn, hld pw confusion, decreased po intake. Per aide, seen on Saturday able to converse and eating with  normal appetite. Today has had decreased appetite and frequent, watery bowel movements. Patient only complaining of throat pain. Denies fever, chills, sob, back pain, cp, vomiting, trauma. 89 year old lady DNI/DNR (per previous records), with history of afib, hypertension and DLD was brought in for altered mental status  as per ED note, the aid reported decreased appetite and frequent, watery bowel movements. the patient denies abdominal pain, fever, dyspnea, headache  I called the emergency contact, the daughter in law reported that there is no relationship with the patient and that her daughter and granddaughter usually visit her. she lives in a senior home and has aid that visits her regularly. I tried calling the daughter, no answer, left a voice message.  to note that the patient was recently admitted, sep 2019, for severe sepsis 2/2 colitis.    in the ED she was hypotensive 74/41 and tachycardic, no documented fever  WBC=43K, lac acid=3.1  CT showed pan colitis

## 2019-11-05 NOTE — CONSULT NOTE ADULT - ATTENDING COMMENTS
I have personally seen, examined, and participated in the care of this patient. I have reviewed all pertinent clinical information, including history, physical exam, plan and the NP's note and agree except as noted.    89 year old woman with female admitted for septic shock in the setting of colitis. Multiple hospitalizations for similar diagnosis in the past.  Palliative care was consulted for GOC.  Patient denied any specific on exam and later in the afternoon, the patient only nodded her head to questions I asked her.  Please see NP's note for full details, but in brief, the patient's daughter was called - she did not wish to discuss any GOC or other clinical information over the phone and stated she could not come in for a family meeting given her own personal/health issues.  Palliative care will be available for a GOC meeting.

## 2019-11-05 NOTE — H&P ADULT - ASSESSMENT
Pan colitis, septic shock   -NPO  -IV hydration  -Cipro and flagyl  -GI PCR, C diff, stool cx    *LARS  -most likely prerenal     Mild elevation in troponin  -0.02-->   -EKG  -repeat ekg in am  -echo    *Atrial fibrillation 89 year old lady DNI/DNR (per previous records), with history of afib, hypertension and DLD was brought in for altered mental status    *AMS 2/2 metabolic encephalopathy from septic shock, she is oriented *2    *Pan colitis, septic shock   -CT noted, pan colitis, lactic acid trending down, leukocytosis  -NPO for now  -IV hydration, levophed through 18G right EJ  -Cipro and flagyl, monitor QT  -GI PCR, C diff, stool cx  -follow up blood and urine culture    *LARS  -most likely prerenal   -per last med rec she was on ace inh and HCTZ -> hold for now  -IV hydration   -accurate intake output     *Mild elevation in troponin, type II demand ischemia  -0.02--> 0.01  -EKG afib  -repeat ekg in am  -echo    *Atrial fibrillation   -? anticoagulation, per notes she is off AC but discharge med rec she was discharged on xarelto, please verify med rec in am    *HTN currently hypotensive, hold all anti-hypertensive medications    *DVT ppx: heparin for now  *DNI/DNR

## 2019-11-05 NOTE — CONSULT NOTE ADULT - ASSESSMENT
Consult Summary    This is a 89 y/o female with PMH of HTN, DLD, AFIB who was admitted for septic shock 2/2 pan colitis. Patient was recently discharged for similar problem (severe sepsis 2/2 colitis.)     Patient is awake and oriented x2, denies any pain, no signs or symptoms of distress noted. Patient lives in a senior resident, has a HHA for 8-10/day as per daughter for assistance with ADL's. Patient has a MOLST signed by her on file indicating a DNI/DNR.     Palliative team reached out to daughter Emmy Franco ([phone]) who was reluctant to discuss goals of care with the team. Daughter said she prefers to call the hospital herself to ascertain the identity of whom she is speaking with, this is  because she is not comfortable discussing sensitive issue on the phone. Palliative team suggested that daughter should come in person for a family meeting  but she said it is difficult for her to travel  due to logistic and health issues. Palliative team advised daughter that when the medical team calls to provide update on patient's status, to answer the  call and request a call back number, so daughter can call back to confirm that the call is legit.      Palliative team will be available to be in a family meeting.         Morphine Equivalent Daily Dose (MEDD): None    Recommendations:  Ongoing medical mgt  GOC to be determined when family is available  DNI/DNR  we will f/u           Please Call [phone] PRN Consult Summary    This is a 89 y/o female with PMH of HTN, DLD, AFIB who was admitted for septic shock 2/2 pan colitis. Patient was recently discharged for similar problem (severe sepsis 2/2 colitis.)     Patient is awake and oriented x2, denies any pain, no signs or symptoms of distress noted. Patient lives in a senior resident, has a HHA for 8-10 housr/day as per daughter for assistance with ADL's. Patient has a MOLST signed by her on file indicating a DNI/DNR.     Palliative team reached out to daughter Emmy Franco ([phone]) who was reluctant to discuss goals of care with the team. Daughter said she prefers to call the hospital herself to ascertain the identity of whom she is speaking with, this is  because she is not comfortable discussing sensitive issue on the phone. Palliative team suggested that daughter should come in person for a family meeting  but she said it is difficult for her to travel  due to logistic and health issues. Palliative team advised daughter that when the medical team calls to provide update on patient's status, to answer the  call and request a call back number, so daughter can call back to confirm that the call is legit.      Palliative team will be available to be in a family meeting.         Morphine Equivalent Daily Dose (MEDD): None    Recommendations:  Ongoing medical mgt  GOC to be determined when family is available  DNI/DNR  we will f/u           Please Call [phone] PRN Consult Summary    This is a 89 y/o female with PMH of HTN, DLD, AFIB who was admitted for septic shock 2/2 pan colitis. Patient was recently discharged for similar problem (severe sepsis 2/2 colitis.)     Patient is awake and oriented x2, denies any pain, no signs or symptoms of distress noted. Patient lives in a senior resident, has a HHA for 8-10 housr/day as per daughter for assistance with ADL's. Patient has a MOLST signed by her on file indicating a DNI/DNR.     Palliative team reached out to daughter Emmy Franco ([phone]) who was reluctant to discuss goals of care with the team. Daughter said she prefers to call the hospital herself to ascertain the identity of whom she is speaking with, this is  because she is not comfortable discussing sensitive issue on the phone. Palliative team suggested that daughter should come in person for a family meeting  but she said it is difficult for her to travel  due to logistic and health issues. Palliative team advised daughter that when the medical team calls to provide update on patient's status, to answer the  call and request a call back number, so daughter can call back to confirm that the call is legit.      Palliative team will be available to be in a family meeting.         Morphine Equivalent Daily Dose (MEDD): None    Recommendations:  Ongoing medical mgt  GOC to be determined when family is available  we will f/u           Please Call [phone] PRN

## 2019-11-05 NOTE — ED ADULT NURSE REASSESSMENT NOTE - NS ED NURSE REASSESS COMMENT FT1
pt resting comfortably, denies any discomfort. no distress noted at this time. iv intact, safety maintained, on monitor. VSS. on levo and bicarb drip, tolerating well.

## 2019-11-05 NOTE — CONSULT NOTE ADULT - SUBJECTIVE AND OBJECTIVE BOX
NEPHROLOGY CONSULTATION NOTE    Patient is poor historian, hx taken from chart.  89 year old lady DNI/DNR (per previous records), with history of afib, hypertension and DLD was brought in for altered mental status  as per ED note, the aid reported decreased appetite and frequent, watery bowel movements. the patient denies abdominal pain, fever, dyspnea, headache  I called the emergency contact, the daughter in law reported that there is no relationship with the patient and that her daughter and granddaughter usually visit her. she lives in a senior home and has aid that visits her regularly. I tried calling the daughter, no answer, left a voice message.  to note that the patient was recently admitted, sep 2019, for severe sepsis 2/2 colitis.    in the ED she was hypotensive 74/41 and tachycardic, no documented fever  WBC=43K, lac acid=3.1  CT showed pan colitis (19)     Pt on levophed, looks comfortable. had a dark loose bowel movement.    PAST MEDICAL & SURGICAL HISTORY:  Dyslipidemia  Hypertension  Atrial fibrillation  H/O abdominal hysterectomy  History of cholecystectomy    Allergies:  penicillin (Rash)    Home Medications:  Calcium 500+D oral tablet, chewable: 1 tab(s) orally 3 times a day (26 Aug 2018 02:13)  ferrous sulfate 325 mg (65 mg elemental iron) oral tablet: 1 tab(s) orally 2 times a day (26 Aug 2018 02:14)  hydroCHLOROthiazide 12.5 mg oral tablet: 1 tab(s) orally once a day (26 Aug 2018 02:13)  Xarelto 10 mg oral tablet: 1 tab(s) orally once a day (26 Aug 2018 02:13)    Hospital Medications:   MEDICATIONS  (STANDING):  chlorhexidine 4% Liquid 1 Application(s) Topical daily  ciprofloxacin     Tablet 250 milliGRAM(s) Oral daily  heparin  Injectable 5000 Unit(s) SubCutaneous every 12 hours  metroNIDAZOLE  IVPB 500 milliGRAM(s) IV Intermittent every 8 hours  norepinephrine Infusion 0.05 MICROgram(s)/kG/Min (4.688 mL/Hr) IV Continuous <Continuous>  sodium bicarbonate  Infusion 0.45 mEq/kG/Hr (150 mL/Hr) IV Continuous <Continuous>      SOCIAL HISTORY:  Denies ETOH,Smoking,   FAMILY HISTORY:  No pertinent family history in first degree relatives        REVIEW OF SYSTEMS:    All other review of systems is negative unless indicated above.    VITALS:  T(F): 98.2 (19 @ 04:00), Max: 99.4 (19 @ 16:30)  HR: 95 (19 @ 08:10)  BP: 91/54 (19 @ 08:10)  RR: 19 (19 @ 06:00)  SpO2: 97% (19 @ 08:10)     @ 07:01  -   @ 09:04  --------------------------------------------------------  IN: 0 mL / OUT: 1 mL / NET: -1 mL        Weight (kg): 50 ( @ 19:22)      I&O's Detail    2019 07:01  -  2019 09:04  --------------------------------------------------------  IN:  Total IN: 0 mL    OUT:    Stool: 1 mL  Total OUT: 1 mL    Total NET: -1 mL        Creatine Kinase, Serum: 240 U/L (19 @ 23:50)      PHYSICAL EXAM:  Constitutional: NAD, lying comfortably in bed  Respiratory: CTAB, no wheezes, rales or rhonchi  Cardiovascular: S1, S2, RRR  Gastrointestinal: BS+, soft, NT/ND  Extremities: No peripheral edema  Neurological: A/O x 1  : catheter +     Vascular Access:    LABS:      132<L>  |  91<L>  |  98<HH>  ----------------------------<  223<H>  3.5   |  19  |  5.8<HH>    Ca    7.8<L>      2019 23:50  Mg     1.9         TPro  5.4<L>  /  Alb  2.4<L>  /  TBili  0.3  /  DBili      /  AST  18  /  ALT  7   /  AlkPhos  90      Creatinine Trend: 5.8 <--, 7.7 <--                        13.7   49.59 )-----------( 219      ( 2019 23:50 )             39.1       Urine Studies:  Urinalysis Basic - ( 2019 16:30 )    Color: Linda / Appearance: Slightly Turbid / S.023 / pH:   Gluc:  / Ketone: Negative  / Bili: Small / Urobili: <2 mg/dL   Blood:  / Protein: 30 mg/dL / Nitrite: Negative   Leuk Esterase: Negative / RBC: 6 /HPF / WBC 10 /HPF   Sq Epi:  / Non Sq Epi: 11 /HPF / Bacteria: Negative      RADIOLOGY & ADDITIONAL STUDIES:  < from: US Kidney and Bladder (19 @ 02:42) >  IMPRESSION:  No hydronephrosis.    < end of copied text >    < from: CT Abdomen and Pelvis No Cont (19 @ 20:49) >  IMPRESSION:       Diffuse circumferential thickening of the colon with mild pericolonic   inflammation, compatible with pancolitis. No intraperitoneal free air or   drainable collection.    < end of copied text >    < from: CT Head No Cont (19 @ 20:49) >  IMPRESSION:     1.  No acute intra-cranial pathology.    2.  Chronic microvascular ischemic changes.     < end of copied text >    < from: Xray Chest 1 View AP/PA (19 @ 18:21) >  Impression:    No radiographic evidence of acute pulmonary disease.    < end of copied text > NEPHROLOGY CONSULTATION NOTE    Patient is poor historian, hx taken from chart.  89 year old lady DNI/DNR (per previous records), with history of afib, hypertension and DLD was brought in for altered mental status  as per ED note, the aid reported decreased appetite and frequent, watery bowel movements. the patient denies abdominal pain, fever, dyspnea, headache  I called the emergency contact, the daughter in law reported that there is no relationship with the patient and that her daughter and granddaughter usually visit her. she lives in a senior home and has aid that visits her regularly. I tried calling the daughter, no answer, left a voice message.  to note that the patient was recently admitted, sep 2019, for severe sepsis 2/2 colitis.    in the ED she was hypotensive 74/41 and tachycardic, no documented fever  WBC=43K, lac acid=3.1  CT showed pan colitis (19)     Pt on levophed, looks comfortable. had a dark loose bowel movement.    PAST MEDICAL & SURGICAL HISTORY:  Dyslipidemia  Hypertension  Atrial fibrillation  H/O abdominal hysterectomy  History of cholecystectomy    Allergies:  penicillin (Rash)    Home Medications:  Calcium 500+D oral tablet, chewable: 1 tab(s) orally 3 times a day (26 Aug 2018 02:13)  ferrous sulfate 325 mg (65 mg elemental iron) oral tablet: 1 tab(s) orally 2 times a day (26 Aug 2018 02:14)  hydroCHLOROthiazide 12.5 mg oral tablet: 1 tab(s) orally once a day (26 Aug 2018 02:13)  Xarelto 10 mg oral tablet: 1 tab(s) orally once a day (26 Aug 2018 02:13)    Hospital Medications:   MEDICATIONS  (STANDING):  chlorhexidine 4% Liquid 1 Application(s) Topical daily  ciprofloxacin     Tablet 250 milliGRAM(s) Oral daily  heparin  Injectable 5000 Unit(s) SubCutaneous every 12 hours  metroNIDAZOLE  IVPB 500 milliGRAM(s) IV Intermittent every 8 hours  norepinephrine Infusion 0.05 MICROgram(s)/kG/Min (4.688 mL/Hr) IV Continuous <Continuous>  sodium bicarbonate  Infusion 0.45 mEq/kG/Hr (150 mL/Hr) IV Continuous <Continuous>      SOCIAL HISTORY:  Denies ETOH,Smoking,   FAMILY HISTORY:  No pertinent family history in first degree relatives        REVIEW OF SYSTEMS:    All other review of systems is negative unless indicated above.    VITALS:  T(F): 98.2 (19 @ 04:00), Max: 99.4 (19 @ 16:30)  HR: 95 (19 @ 08:10)  BP: 91/54 (19 @ 08:10)  RR: 19 (19 @ 06:00)  SpO2: 97% (19 @ 08:10)     @ 07:01  -   @ 09:04  --------------------------------------------------------  IN: 0 mL / OUT: 1 mL / NET: -1 mL        Weight (kg): 50 ( @ 19:22)      I&O's Detail    2019 07:01  -  2019 09:04  --------------------------------------------------------  IN:  Total IN: 0 mL    OUT:    Stool: 1 mL  Total OUT: 1 mL    Total NET: -1 mL        Creatine Kinase, Serum: 240 U/L (19 @ 23:50)      PHYSICAL EXAM:  Constitutional: NAD, lying comfortably in bed  Respiratory: CTAB, no wheezes, rales or rhonchi  Cardiovascular: S1, S2, RRR  Gastrointestinal: BS+, soft, NT/ND  Extremities: No peripheral edema  Neurological: A/O x 1  : catheter +     Vascular Access:    LABS:      132<L>  |  91<L>  |  98<HH>  ----------------------------<  223<H>  3.5   |  19  |  5.8<HH>    Ca    7.8<L>      2019 23:50  Mg     1.9         TPro  5.4<L>  /  Alb  2.4<L>  /  TBili  0.3  /  DBili      /  AST  18  /  ALT  7   /  AlkPhos  90      Creatinine Trend: 5.8 <--, 7.7 <--                        13.7   49.59 )-----------( 219      ( 2019 23:50 )             39.1     Blood Gas Profile - Venous (19 @ 22:45)    pH, Venous: 7.34    pCO2, Venous: 50 mmHg    pO2, Venous: 14 mmHg    HCO3, Venous: 27 mmoL/L    Base Excess, Venous: 0.1 mmoL/L    Oxygen Saturation, Venous: 14 %      Urine Studies:  Urinalysis Basic - ( 2019 16:30 )    Color: Linda / Appearance: Slightly Turbid / S.023 / pH:   Gluc:  / Ketone: Negative  / Bili: Small / Urobili: <2 mg/dL   Blood:  / Protein: 30 mg/dL / Nitrite: Negative   Leuk Esterase: Negative / RBC: 6 /HPF / WBC 10 /HPF   Sq Epi:  / Non Sq Epi: 11 /HPF / Bacteria: Negative      RADIOLOGY & ADDITIONAL STUDIES:  < from: US Kidney and Bladder (19 @ 02:42) >  IMPRESSION:  No hydronephrosis.    < end of copied text >    < from: CT Abdomen and Pelvis No Cont (19 @ 20:49) >  IMPRESSION:       Diffuse circumferential thickening of the colon with mild pericolonic   inflammation, compatible with pancolitis. No intraperitoneal free air or   drainable collection.    < end of copied text >    < from: CT Head No Cont (19 @ 20:49) >  IMPRESSION:     1.  No acute intra-cranial pathology.    2.  Chronic microvascular ischemic changes.     < end of copied text >    < from: Xray Chest 1 View AP/PA (19 @ 18:21) >  Impression:    No radiographic evidence of acute pulmonary disease.    < end of copied text >

## 2019-11-05 NOTE — CONSULT NOTE ADULT - SUBJECTIVE AND OBJECTIVE BOX
REQUESTED OF: DR Higginbotham     Chart reviewed, Hospital Day 2    STEPH DIA 89yFemale  HPI:  89 year old lady DNI/DNR (per previous records), with history of afib, hypertension and DLD was brought in for altered mental status  as per ED note, the aid reported decreased appetite and frequent, watery bowel movements. the patient denies abdominal pain, fever, dyspnea, headache  I called the emergency contact, the daughter in law reported that there is no relationship with the patient and that her daughter and granddaughter usually visit her. she lives in a senior home and has aid that visits her regularly. I tried calling the daughter, no answer, left a voice message.  to note that the patient was recently admitted, sep 2019, for severe sepsis 2/2 colitis.    in the ED she was hypotensive 74/41 and tachycardic, no documented fever  WBC=43K, lac acid=3.1  CT showed pan colitis (05 Nov 2019 00:06)        PAST MEDICAL & SURGICAL HISTORY:  Dyslipidemia  Hypertension  Atrial fibrillation  H/O abdominal hysterectomy  History of cholecystectomy      Subjective and Objective:    Patient denies any pain or discomfort . NAD    Discussed with Resident     Focused Palliative Care Evaluation:                   Symptoms:                                      Pain                                     Dyspnea                                     N/V                                     Appetite                                     Anxiety                                     Other _____________________                     Support Devices:              PHYSICAL EXAM:    Constitutional: Elderly frail female, NAD    Eyes: PERRL    Respiratory: No dyspnea noted    Cardiovascular: S1S2 RRR    Gastrointestinal: Soft, ND/NT    Extremities: No edema     Neurological: Alert and oriented x 2           T(C): 36.8, Max: 37.4 (16:30)  HR: 105 (67 - 107)  BP: 102/68 (69/49 - 137/78)  RR: 20 (16 - 20)  SpO2: 97% (95% - 100%)      LABS/STUDIES:  11-05    133<L>  |  89<L>  |  93<HH>  ----------------------------<  150<H>  3.2<L>   |  21  |  5.6<HH>    Ca    7.7<L>      05 Nov 2019 08:09  Mg     1.8     11-05    TPro  5.4<L>  /  Alb  2.4<L>  /  TBili  0.3  /  DBili  x   /  AST  18  /  ALT  7   /  AlkPhos  90  11-04                            14.3   35.31 )-----------( 177      ( 05 Nov 2019 08:09 )             41.1       MEDICATIONS  (STANDING):  chlorhexidine 4% Liquid 1 Application(s) Topical daily  ciprofloxacin     Tablet 250 milliGRAM(s) Oral daily  heparin  Injectable 5000 Unit(s) SubCutaneous every 12 hours  lactated ringers. 1000 milliLiter(s) (100 mL/Hr) IV Continuous <Continuous>  metroNIDAZOLE  IVPB 500 milliGRAM(s) IV Intermittent every 8 hours  norepinephrine Infusion 0.5 MICROgram(s)/kG/Min (46.875 mL/Hr) IV Continuous <Continuous>  sodium bicarbonate 325 milliGRAM(s) Oral every 8 hours  vancomycin    Solution 125 milliGRAM(s) Oral every 6 hours    MEDICATIONS  (PRN):          iStop: [id number]           PPS  Level    50%       Note PPS = Palliative Performance Scale; (c)2001, [facility] Society       Range from 100% meaning Full ambulation/self-care/intake/Level of Consicous                                                                              to        10% meaning Bedbound/Unable to do any activity/extensive disease /Total Care/ No PO intake/ LOC=Full/drowsy/+/-confusion        (0% = death)                     Prior to acute illness, patient's functionality reportedly REQUESTED OF: DR Higginbotham     Chart reviewed, Hospital Day 2    STEPH DAI 89yFemale  HPI:  89 year old lady DNI/DNR (per previous records), with history of afib, hypertension and DLD was brought in for altered mental status  as per ED note, the aid reported decreased appetite and frequent, watery bowel movements. the patient denies abdominal pain, fever, dyspnea, headache  I called the emergency contact, the daughter in law reported that there is no relationship with the patient and that her daughter and granddaughter usually visit her. she lives in a senior home and has aid that visits her regularly. I tried calling the daughter, no answer, left a voice message.  to note that the patient was recently admitted, sep 2019, for severe sepsis 2/2 colitis.    in the ED she was hypotensive 74/41 and tachycardic, no documented fever  WBC=43K, lac acid=3.1  CT showed pan colitis (05 Nov 2019 00:06)        PAST MEDICAL & SURGICAL HISTORY:  Dyslipidemia  Hypertension  Atrial fibrillation  H/O abdominal hysterectomy  History of cholecystectomy      Subjective and Objective:    Patient denies any pain or discomfort . NAD    Discussed with Resident     Focused Palliative Care Evaluation:                   Symptoms:  None                                     Pain                                     Dyspnea                                     N/V                                     Appetite                                     Anxiety                                     Other _____________________                     Support Devices:              PHYSICAL EXAM:    Constitutional: Elderly frail female, NAD    Eyes: PERRL    Respiratory: No dyspnea noted    Cardiovascular: S1S2 RRR    Gastrointestinal: Soft, ND/NT    Extremities: No edema     Neurological: Alert and oriented x 2           T(C): 36.8, Max: 37.4 (16:30)  HR: 105 (67 - 107)  BP: 102/68 (69/49 - 137/78)  RR: 20 (16 - 20)  SpO2: 97% (95% - 100%)      LABS/STUDIES:  11-05    133<L>  |  89<L>  |  93<HH>  ----------------------------<  150<H>  3.2<L>   |  21  |  5.6<HH>    Ca    7.7<L>      05 Nov 2019 08:09  Mg     1.8     11-05    TPro  5.4<L>  /  Alb  2.4<L>  /  TBili  0.3  /  DBili  x   /  AST  18  /  ALT  7   /  AlkPhos  90  11-04                            14.3   35.31 )-----------( 177      ( 05 Nov 2019 08:09 )             41.1       MEDICATIONS  (STANDING):  chlorhexidine 4% Liquid 1 Application(s) Topical daily  ciprofloxacin     Tablet 250 milliGRAM(s) Oral daily  heparin  Injectable 5000 Unit(s) SubCutaneous every 12 hours  lactated ringers. 1000 milliLiter(s) (100 mL/Hr) IV Continuous <Continuous>  metroNIDAZOLE  IVPB 500 milliGRAM(s) IV Intermittent every 8 hours  norepinephrine Infusion 0.5 MICROgram(s)/kG/Min (46.875 mL/Hr) IV Continuous <Continuous>  sodium bicarbonate 325 milliGRAM(s) Oral every 8 hours  vancomycin    Solution 125 milliGRAM(s) Oral every 6 hours    MEDICATIONS  (PRN):          iStop: [id number] - no meds          PPS  Level    40%       Note PPS = Palliative Performance Scale; (c)2001, [facility] Society       Range from 100% meaning Full ambulation/self-care/intake/Level of Consicous                                                                              to        10% meaning Bedbound/Unable to do any activity/extensive disease /Total Care/ No PO intake/ LOC=Full/drowsy/+/-confusion        (0% = death)                     Prior to acute illness, patient's functionality unknown

## 2019-11-05 NOTE — CHART NOTE - NSCHARTNOTEFT_GEN_A_CORE
Palliative team consult, saw patient in the ER.  89 y/o female admitted from home ([facility]) with Sepsis and altered mental status.  Pt has a PMHX of  afib, hypertension and DLD.  Pt is alert but confused, unable to give history.  Palliative team reached out to daughter, Emmy, who reports she has her own issues and unable to get to the hospital, she also did not know her mother's medical history.   Pt does have HHA at home, unclear how many hours.  Daughter resistant to make decisions regarding her mothers care.  Old MOLST in chart indicated DNR/DNI, MOLST unsigned by MD.  Pt appeared comfortable, unable to establish Goals of Care.   Palliative will follow and provide ongoing symptom management and clarification of Goals of Care.  [phone].

## 2019-11-06 NOTE — PROGRESS NOTE ADULT - ASSESSMENT
89 y/o F with LARS in hospital for AMS, decreased appetite, frequent watery bowel movements.  PMH HTN, Afib, DLD, s/p hysterectomy, s/p cholecystectomy.    # LARS : baseline cr. ~ 0.7 in 09/2019.   - Likely prerenal due to decreased appetite plus diarrhea.   - serum creatinine improving since admission   - please document urine output.   - Strict I/O   - continue LR   - bicarb increased on repeat labs. stop oral bicarb.   - UA noted, check urine creatinine, Na, Urea, Pr/Cr ratio   - hypokalemia noted, replenish orally. monitor K level.   - encourage oral intake   - Monitor closely for volume overload   - on levophed, BP noted, keep MAP > 65   - sono noted, no hydro   - corrected Ca, Mg at goal.   - CXR noted for new left lower lobe opacity, ? PNA   - CT abdomen noted for pancolitis   - on Cipro, flagyl and vanco PO   - GI eval   - repeat BMP, trend creatinine   - palliative care eval   - no acute indication for RRT at the moment.   - with advanced age and baseline status, would recommend against RRT if indicated.   - avoid nephrotoxins and hypotension   - will follow

## 2019-11-06 NOTE — PROGRESS NOTE ADULT - SUBJECTIVE AND OBJECTIVE BOX
Nephrology progress note    Patient is seen and examined, events over the last 24 h noted .  no new complaints.    Allergies:  penicillin (Rash)    Hospital Medications:   MEDICATIONS  (STANDING):  chlorhexidine 4% Liquid 1 Application(s) Topical daily  ciprofloxacin     Tablet 250 milliGRAM(s) Oral daily  heparin  Injectable 5000 Unit(s) SubCutaneous every 12 hours  lactated ringers. 1000 milliLiter(s) (100 mL/Hr) IV Continuous <Continuous>  metroNIDAZOLE  IVPB 500 milliGRAM(s) IV Intermittent every 8 hours  norepinephrine Infusion 0.25 MICROgram(s)/kG/Min (23.438 mL/Hr) IV Continuous <Continuous>  sodium bicarbonate 325 milliGRAM(s) Oral every 8 hours  vancomycin    Solution 125 milliGRAM(s) Oral every 6 hours        VITALS:  T(F): 97.5 (19 @ 00:09), Max: 97.5 (19 @ 00:09)  HR: 101 (19 @ 07:45)  BP: 118/86 (19 @ 10:58)  RR: 18 (19 @ 07:45)  SpO2: 97% (19 @ 07:45)  Wt(kg): --     @ 07:01  -   @ 07:00  --------------------------------------------------------  IN: 0 mL / OUT: 1 mL / NET: -1 mL          PHYSICAL EXAM:  Constitutional: NAD  Respiratory: CTAB, no wheezes, rales or rhonchi  Cardiovascular: S1, S2, RRR  Gastrointestinal: BS+, soft, NT/ND  Extremities: No peripheral edema  :  No mcgregor.       LABS:      136  |  91<L>  |  91<HH>  ----------------------------<  136<H>  3.1<L>   |  27  |  4.4<HH>    Creatinine Trend: 4.4<--, 5.6<--, 5.8<--, 7.7<--    Ca    7.7<L>      2019 16:14  Mg     1.8         TPro  5.3<L>  /  Alb  2.3<L>  /  TBili  0.3  /  DBili      /  AST  17  /  ALT  7   /  AlkPhos  88                            14.3   35.31 )-----------( 177      ( 2019 08:09 )             41.1     Culture - Blood (19 @ 18:00)    Specimen Source: .Blood Blood-Peripheral    Culture Results:   No growth to date.      Urine Studies:  Urinalysis Basic - ( 2019 16:30 )    Color: Linda / Appearance: Slightly Turbid / S.023 / pH:   Gluc:  / Ketone: Negative  / Bili: Small / Urobili: <2 mg/dL   Blood:  / Protein: 30 mg/dL / Nitrite: Negative   Leuk Esterase: Negative / RBC: 6 /HPF / WBC 10 /HPF   Sq Epi:  / Non Sq Epi: 11 /HPF / Bacteria: Negative      RADIOLOGY & ADDITIONAL STUDIES:  < from: Xray Chest 1 View- PORTABLE-Routine (19 @ 08:12) >  IMPRESSION:      New left lower lobe opacity/effusion.    < end of copied text >

## 2019-11-06 NOTE — PROGRESS NOTE ADULT - SUBJECTIVE AND OBJECTIVE BOX
STEPH DAI  89y, Female  Allergy: penicillin (Rash)    Hospital Day: 2d    Patient seen and examined earlier today. Does not appear to be in acute distress. AOx1. Alert, but not oriented.    PMH/PSH:  PAST MEDICAL & SURGICAL HISTORY:  Dyslipidemia  Hypertension  Atrial fibrillation  H/O abdominal hysterectomy  History of cholecystectomy    VITALS:  T(F): 96.8 (11-06-19 @ 16:13), Max: 97.5 (11-06-19 @ 00:09)  HR: 106 (11-06-19 @ 16:13)  BP: 153/89 (11-06-19 @ 16:13) (82/53 - 153/89)  RR: 18 (11-06-19 @ 16:13)  SpO2: 96% (11-06-19 @ 16:13)    TESTS & MEASUREMENTS:  Weight (Kg): 50 (11-04-19 @ 19:22)    11-05-19 @ 07:01  -  11-06-19 @ 07:00  --------------------------------------------------------  IN: 0 mL / OUT: 1 mL / NET: -1 mL                        14.5   22.79 )-----------( 157      ( 06 Nov 2019 11:11 )             42.0     11-06    139  |  96<L>  |  81<HH>  ----------------------------<  119<H>  3.7   |  24  |  2.8<H>    Ca    7.7<L>      06 Nov 2019 11:11  Mg     1.8     11-05    TPro  5.3<L>  /  Alb  2.3<L>  /  TBili  0.3  /  DBili  x   /  AST  17  /  ALT  7   /  AlkPhos  88  11-05    LIVER FUNCTIONS - ( 05 Nov 2019 16:14 )  Alb: 2.3 g/dL / Pro: 5.3 g/dL / ALK PHOS: 88 U/L / ALT: 7 U/L / AST: 17 U/L / GGT: x           CARDIAC MARKERS ( 04 Nov 2019 23:50 )  x     / 0.01 ng/mL / 240 U/L / x     / 7.0 ng/mL  CARDIAC MARKERS ( 04 Nov 2019 21:10 )  x     / 0.02 ng/mL / x     / x     / x        Culture - Blood (collected 11-04-19 @ 18:00)  Source: .Blood Blood-Peripheral  Preliminary Report (11-06-19 @ 02:01):    No growth to date.    Culture - Blood (collected 11-04-19 @ 18:00)  Source: .Blood Blood-Peripheral  Preliminary Report (11-06-19 @ 02:01):    No growth to date.    Culture - Urine (collected 11-04-19 @ 16:30)  Source: .Urine Clean Catch (Midstream)  Final Report (11-06-19 @ 00:19):    No growth    MEDICATIONS:  MEDICATIONS  (STANDING):  chlorhexidine 4% Liquid 1 Application(s) Topical daily  ciprofloxacin   IVPB      ciprofloxacin   IVPB 200 milliGRAM(s) IV Intermittent once  ciprofloxacin   IVPB 200 milliGRAM(s) IV Intermittent once  heparin  Injectable 5000 Unit(s) SubCutaneous every 12 hours  lactated ringers. 1000 milliLiter(s) (100 mL/Hr) IV Continuous <Continuous>  metroNIDAZOLE  IVPB 500 milliGRAM(s) IV Intermittent every 8 hours  norepinephrine Infusion 0.25 MICROgram(s)/kG/Min (23.438 mL/Hr) IV Continuous <Continuous>  sodium bicarbonate 325 milliGRAM(s) Oral every 8 hours  vancomycin    Solution 125 milliGRAM(s) Oral every 6 hours    MEDICATIONS  (PRN):    HOME MEDICATIONS:  Calcium 500+D oral tablet, chewable (08-26)  ferrous sulfate 325 mg (65 mg elemental iron) oral tablet (08-26)  hydroCHLOROthiazide 12.5 mg oral tablet (08-26)  Xarelto 10 mg oral tablet (08-26)    REVIEW OF SYSTEMS:  All other review of systems is negative unless indicated above.     PHYSICAL EXAM:  GENERAL: NAD, AOx1  HEENT: No Swelling  CHEST/LUNG: Good air entry, No wheezing  HEART: RRR, No murmurs  ABDOMEN: Soft, Bowel sounds present, Mildly tender to palpation  EXTREMITIES:  No clubbing

## 2019-11-06 NOTE — PROGRESS NOTE ADULT - ASSESSMENT
89 F DNI/DNR  with history of afib, hypertension and DLD was brought in for altered mental status    # AMS secondary to Pan colitis, septic shock   - CT noted, pan colitis, lactic acid trending down, leukocytosis  - IV hydration, levophed through 18G right EJ  - Cipro and flagyl, monitor QT  - GI PCR, C diff  POSITIVE   - follow up blood and urine culture   - On Levophed 0.5 mg/kg/hr, and bolus, BP labile, attempt to wean off levophed   - CXR showing new LLL opacity/effusion    # LARS  -most likely prerenal, c/w IVF   -per last med rec she was on ace inh and HCTZ -> hold for now  -accurate intake output     # Atrial fibrillation   - Not on AC at the moment     # HTN currently hypotensive, hold all anti-hypertensive medications    *DVT ppx: heparin for now  *DNI/DNR

## 2019-11-06 NOTE — PROGRESS NOTE ADULT - ASSESSMENT
89 year old lady DNI/DNR (per previous records), with history of afib, hypertension and DLD was brought in for altered mental status. Initially admitted to ICU for severe sepsis 2/2 colitis.     1. Severe Sepsis 2/2 C. Diff colitis: Will try to wean off low-dose levo, was currently at 0.5. Cont vancomycin PO, cipro/flagyl, IVF. Palliative care following to clarify DNR/DNI status and GOC.  2. Afib: Currently off BB d/t sepsis  3. HTN: Holding BP meds d/t sepsis    GI/DVT PPX    #Progress Note Handoff  Pending (specify): Resolution of colitis and sepsis  Family discussion: Unable to reach daughter. Palliative team reached out to daughter, Emmy, who reports she has her own issues and unable to get to the hospital, she also did not know her mother's medical history. GOC could not be established  Disposition: TBD 89 year old lady DNI/DNR (per previous records), with history of afib, hypertension and DLD was brought in for altered mental status. Initially admitted to ICU for severe sepsis 2/2 colitis.     1. Severe Sepsis 2/2 C. Diff colitis: Will try to wean off low-dose levo, was currently at 0.5. Cont vancomycin PO, cipro/flagyl, IVF. Palliative care following to clarify DNR/DNI status and GOC.  2. Afib: Currently off BB d/t sepsis  3. HTN: Holding BP meds d/t sepsis    GI/DVT PPX    #Progress Note Handoff  Pending (specify): Resolution of colitis and sepsis  Family discussion: Palliative team reached out to daughter, Emmy, who reports she has her own issues and unable to get to the hospital, she also did not know her mother's medical history. GOC could not be established. However, discussed with granddaughter at bedside who was able to communicate better with her grandmother. Grandmother states in the case her heart were to stop, she would not like to be resuscitated. In case her breathing was to get labored, she does not want to be intubated. When asked why, she states that because it will only prolong her suffering. Unclear of patient's mental capacity, but granddaughter insists she can make decisions for herself. Will maintain DNR/DNI status for now. Benoit states to call anytime for further questions ([phone]).   Disposition: TBD

## 2019-11-06 NOTE — PROGRESS NOTE ADULT - SUBJECTIVE AND OBJECTIVE BOX
The pt was admitted for, LARS ACUTE KIDNEY INJURY SEPSIS DIARRHEA  LARS ACUTE KIDNEY INJURY SEPSIS DIARRHEA    This is hospital day 3d  OVERNIGHT EVENTS:   None  HISTORY OF PRESENTING ILLNESS:   Reason for Admission: Altered mental status	  History of Present Illness: 	  89 year old lady DNI/DNR (per previous records), with history of afib, hypertension and DLD was brought in for altered mental status  as per ED note, the aid reported decreased appetite and frequent, watery bowel movements. the patient denies abdominal pain, fever, dyspnea, headache  I called the emergency contact, the daughter in law reported that there is no relationship with the patient and that her daughter and granddaughter usually visit her. she lives in a senior home and has aid that visits her regularly. I tried calling the daughter, no answer, left a voice message.  to note that the patient was recently admitted, sep 2019, for severe sepsis 2/2 colitis.    in the ED she was hypotensive 74/41 and tachycardic, no documented fever  WBC=43K, lac acid=3.1  CT showed pan colitis  Past Medical, Past Surgical, and Family History:  PAST MEDICAL HISTORY:  Atrial fibrillation     Dyslipidemia     Hypertension.     PAST SURGICAL HISTORY:  H/O abdominal hysterectomy     History of cholecystectomy.     FAMILY HISTORY:  No pertinent family history in first degree relatives.     No Pertinent Family History in first degree relatives of: unable to obtain.    Social History:  Social History (marital status, living situation, occupation, tobacco use, alcohol and drug use, and sexual history): per previous records: Ambulates with walker, No EtOH and illicit drug use Former smoker; quit in 1970s; 30 pack year history	    MEDICATIONS:  STANDING MEDICATIONS  chlorhexidine 4% Liquid 1 Application(s) Topical daily  heparin  Injectable 5000 Unit(s) SubCutaneous every 12 hours  lactated ringers. 1000 milliLiter(s) IV Continuous <Continuous>  metroNIDAZOLE  IVPB 500 milliGRAM(s) IV Intermittent every 8 hours  vancomycin    Solution 125 milliGRAM(s) Oral every 6 hours    PRN MEDICATIONS    VITALS:   T(F): 96.2  HR: 117  BP: 95/53  RR: 17  SpO2: 98%    LABS:                        14.5   22.79 )-----------( 157      ( 06 Nov 2019 11:11 )             42.0     11-06    139  |  96<L>  |  81<HH>  ----------------------------<  119<H>  3.7   |  24  |  2.8<H>    Ca    7.7<L>      06 Nov 2019 11:11  Mg     1.8     11-05    TPro  5.3<L>  /  Alb  2.3<L>  /  TBili  0.3  /  DBili  x   /  AST  17  /  ALT  7   /  AlkPhos  88  11-05              GI PCR Panel, Stool (collected 06 Nov 2019 15:00)  Source: .Stool Feces  Final Report (07 Nov 2019 03:16):    GI PCR Results: NOT detected    *******Please Note:*******    GI panel PCR evaluates for:    Campylobacter, Plesiomonas shigelloides, Salmonella,    Vibrio, Yersinia enterocolitica, Enteroaggregative    Escherichia coli (EAEC), Enteropathogenic E.coli (EPEC),    Enterotoxigenic E. coli (ETEC) lt/st, Shiga-like    toxin-producing E. coli (STEC) stx1/stx2,    Shigella/ Enteroinvasive E. coli (EIEC), Cryptosporidium,    Cyclospora cayetanensis, Entamoeba histolytica,    Giardia lamblia, Adenovirus F 40/41, Astrovirus,    Norovirus GI/GII, Rotavirus A, Sapovirus    Culture - Blood (collected 04 Nov 2019 18:00)  Source: .Blood Blood-Peripheral  Preliminary Report (06 Nov 2019 02:01):    No growth to date.    Culture - Blood (collected 04 Nov 2019 18:00)  Source: .Blood Blood-Peripheral  Preliminary Report (06 Nov 2019 02:01):    No growth to date.    Culture - Urine (collected 04 Nov 2019 16:30)  Source: .Urine Clean Catch (Midstream)  Final Report (06 Nov 2019 00:19):    No growth      RADIOLOGY:  < from: Xray Chest 1 View- PORTABLE-Routine (11.06.19 @ 08:12) >  IMPRESSION:      New left lower lobe opacity/effusion.    < from: CT Abdomen and Pelvis No Cont (11.04.19 @ 20:49) >  IMPRESSION:       Diffuse circumferential thickening of the colon with mild pericolonic   inflammation, compatible with pancolitis. No intraperitoneal free air or   drainable collection.      PHYSICAL EXAM:  GENERAL: No acute distress, well-developed, frail  HEAD:  Atraumatic, Normocephalic  EYES: EOMI, PERRLA, conjunctiva and sclera clear  NECK: Supple, no lymphadenopathy, no JVD  CHEST/LUNG: CTAB; No wheezes, rales, or rhonchi  HEART: Regular rate and rhythm; No murmurs, rubs, or gallops  ABDOMEN: Soft, non-tender, non-distended; normal bowel sounds, no organomegaly  EXTREMITIES:  2+ peripheral pulses b/l, No clubbing, cyanosis, or edema  NEUROLOGY: A&O x 2, no focal deficits  SKIN: No rashes or lesions

## 2019-11-07 NOTE — CONSULT NOTE ADULT - SUBJECTIVE AND OBJECTIVE BOX
Patient is a 89y old  Female who presents with a chief complaint of Altered mental status (07 Nov 2019 15:44)    HPI:  89 year old lady DNI/DNR (per previous records), with history of afib, hypertension and DLD was brought in for altered mental status  as per ED note, the aid reported decreased appetite and frequent, watery bowel movements. the patient denies abdominal pain, fever, dyspnea, headache  I called the emergency contact, the daughter in law reported that there is no relationship with the patient and that her daughter and granddaughter usually visit her. she lives in a senior home and has aid that visits her regularly. I tried calling the daughter, no answer, left a voice message.  to note that the patient was recently admitted, sep 2019, for severe sepsis 2/2 colitis.    in the ED she was hypotensive 74/41 and tachycardic, no documented fever  WBC=43K, lac acid=3.1  CT showed pan colitis (05 Nov 2019 00:06)      PAST MEDICAL & SURGICAL HISTORY:  Dyslipidemia  Hypertension  Atrial fibrillation  H/O abdominal hysterectomy  History of cholecystectomy      Hospital Course:  Severe Sepsis present on admission 2/2 C. Diff colitis: Pancolitis seen on CT abdomen/Pelvis. Cont vancomycin PO, cipro/flagyl, IVF. Palliative care following to clarify DNR/DNI status and GOC.  2. Paroxysmal Afib: Currently off BB d/t sepsis. Will resume home xarelto when diarrhea resolves  3. HTN: Holding BP meds d/t sepsis  4. LARS, pre-renal in setting of septic shock and diarrhea: Creatinine improving on IVF. Continue to monitor  5. Stage 2 bilateral decubiti buttocks ulcers, present on admission: Cont wound care  6. Metabolic Encephalopathy present on admission d/t sepsis, now back to baseline: Will continue to monitor      TODAY'S SUBJECTIVE & REVIEW OF SYMPTOMS:     Constitutional Weakness   Cardio WNL   Resp WNL   GI WNL  Heme WNL  Endo WNL  Skin WNL  MSK WNL  Neuro WNL  Cognitive WNL  Psych WNL      MEDICATIONS  (STANDING):  chlorhexidine 4% Liquid 1 Application(s) Topical daily  diltiazem    Tablet 30 milliGRAM(s) Oral every 6 hours  heparin  Injectable 5000 Unit(s) SubCutaneous every 12 hours  lactated ringers. 1000 milliLiter(s) (75 mL/Hr) IV Continuous <Continuous>  metroNIDAZOLE  IVPB 500 milliGRAM(s) IV Intermittent every 8 hours  vancomycin    Solution 125 milliGRAM(s) Oral every 6 hours    MEDICATIONS  (PRN):      FAMILY HISTORY:  No pertinent family history in first degree relatives      Allergies    penicillin (Rash)    Intolerances        SOCIAL HISTORY:    [    ] Etoh  [    ] Smoking  [    ] Substance abuse     Home Environment:  [  x  ] Home Alone  [    ] Lives with Family  [  x  ] Home Health Aid 10x7    Dwelling:  [ x   ] Apartment  [    ] Private House  [    ] Adult Home  [    ] Skilled Nursing Facility      [    ] Short Term  [    ] Long Term  [    ] Stairs                           [ x   ] Elevator     FUNCTIONAL STATUS PTA: (Check all that apply)  Ambulation: [     ]Independent    [ x   ] Dependent     [    ] Non-Ambulatory  Assistive Device: [    ] SA Cane  [    ]  Q Cane  [ x   ] Walker  [    ]  Wheelchair  ADL : [    ] Independent  [   x ]  Dependent       Vital Signs Last 24 Hrs  T(C): 36 (07 Nov 2019 13:56), Max: 36 (07 Nov 2019 13:56)  T(F): 96.8 (07 Nov 2019 13:56), Max: 96.8 (07 Nov 2019 13:56)  HR: 132 (07 Nov 2019 13:56) (102 - 132)  BP: 111/68 (07 Nov 2019 13:56) (77/52 - 111/68)  BP(mean): --  RR: 18 (07 Nov 2019 13:56) (17 - 18)  SpO2: 98% (06 Nov 2019 20:45) (98% - 98%)      PHYSICAL EXAM: Alert & Oriented X2 follows commands  GENERAL: NAD, well-groomed, well-developed  HEAD:  Atraumatic, Normocephalic  EYES: EOMI, PERRLA, conjunctiva and sclera clear  NECK: Supple  CHEST/LUNG: Clear bilaterally  HEART:   ABDOMEN: Soft, Nontender, Nondistended; Bowel sounds present  EXTREMITIES:  no calf tenderness,no edema BLES    NERVOUS SYSTEM:  Cranial Nerves 2-12 intact [ x   ] Abnormal  [    ][place]  ROM: WFL all extremities [   x ]  Abnormal [     ]  Motor Strength: WFL all extremities  [ x   ]  Abnormal [    ]  Sensation: intact to light touch [   x ] Abnormal [    ]      FUNCTIONAL STATUS:  Bed Mobility: [   ]  Independent [    ]  Supervision [ x   ]  Needs Assistance [  ]  N/A  Transfers: [    ]  Independent [    ]  Supervision [  x  ]  Needs Assistance [    ]  N/A    Ambulation:  [    ]  Independent [    ]  Supervision [    ]  Needs Assistance [   x ]  N/A   ADL:  [    ]   Independent [  x  ] Requires Assistance [    ] N/A       LABS:                        13.2   10.53 )-----------( 112      ( 07 Nov 2019 07:08 )             39.3     11-07    144  |  102  |  66<HH>  ----------------------------<  105<H>  3.3<L>   |  28  |  1.8<H>    Ca    7.7<L>      07 Nov 2019 07:08  Mg     1.6     11-07            RADIOLOGY & ADDITIONAL STUDIES:

## 2019-11-07 NOTE — PROGRESS NOTE ADULT - ASSESSMENT
89 yo F from senior resident w/ PMH of afib, HTN and DLD was brought in to hospital for altered mental status. The aid reported decreased appetite and frequent, watery bowel movements.     # AMS secondary to metabolic encephalopathy from septic shock  # Septic shock secondary to Pan colitis vs C. diff, improved    - CT noted, pan colitis, lactic acid trending down, leukocytosis  - advance to pureed diet today  - Decreased LR to 75  - off levophed  - Vanco PO and flagyl IV  - GI PCR negative, C diff positive, stool cx negative   - f/u blood and urine culture    # LARS likely prerenal secondary to septic shock, improved   - baseline Cr 0.7, admission Cr 7.7  - creatinine is now 1.8   - c/w LR at 75 and PO intake   - accurate intake/output     # Mild elevation in troponin likely type II demand ischemia, stable   - asymptomatic   - EKG shows afib  - f/u echo    # Atrial fibrillation, not on AC  - bedbound  - f/u with need of AC    # HTN remain hypotensive  - hold all anti-hypertensive medications    Diet: Advance to pureed diet today   GI ppx: N/A  DVT ppx: Heparin 5000 q12h    Activity: Increase as tolerated  Code status: Full Code (  ) / DNR ( X ) / DNI ( X )  Disposition: from home, requires IV abx

## 2019-11-07 NOTE — DIETITIAN INITIAL EVALUATION ADULT. - OTHER INFO
Pertinent Medical Information: p/w AMS noted 2/2 metabolic encephalopathy from septic shock. LARS likely prerenal secondary to septic shock, improved per progress notes. Pertinent Medical Information: p/w AMS noted 2/2 metabolic encephalopathy from septic shock. LARS likely prerenal secondary to septic shock, improved per progress notes.    Pertinent Subjective Information: Pt unable to provide nutrition history at this time. No response from emergency contact. NKFA per chart. No ht data available at this time, unable to assess BMI/IBW. Unable to obtain consent for nutrition focused physical assessment at this time.

## 2019-11-07 NOTE — PROGRESS NOTE ADULT - ASSESSMENT
89 y/o F with LARS in hospital for AMS, decreased appetite, frequent watery bowel movements.  PMH HTN, Afib, DLD, s/p hysterectomy, s/p cholecystectomy.    # LARS : baseline cr. ~ 0.7 in 09/2019.   - Likely prerenal due to decreased appetite plus diarrhea.   - serum creatinine improving since admission   - please document urine output.   - Strict I/O   - continue LR   - bicarb increased on repeat labs. stop oral bicarb.   - UA noted, check urine creatinine, Na, Urea, Pr/Cr ratio   - hypokalemia noted, replenish orally. monitor K level.   - encourage oral intake   - Monitor closely for volume overload   - on levophed, BP noted, keep MAP > 65   - sono noted, no hydro   - corrected Ca, Mg at goal.   - CXR noted for new left lower lobe opacity, ? PNA   - CT abdomen noted for pancolitis   - on Cipro, flagyl and vanco PO   - GI eval   - repeat BMP, trend creatinine   - palliative care eval   - no acute indication for RRT at the moment.   - with advanced age and baseline status, would recommend against RRT if indicated.   - avoid nephrotoxins and hypotension   - will follow 89 y/o F with LARS in hospital for AMS, decreased appetite, frequent watery bowel movements.  PMH HTN, Afib, DLD, s/p hysterectomy, s/p cholecystectomy.    # LARS : baseline cr. ~ 0.7 in 09/2019.   - Likely prerenal due to decreased appetite plus diarrhea.   - serum creatinine improving since admission   - Strict I/O   - continue LR if poor po intake    - hypokalemia noted, replenish orally. monitor K level.   - encourage oral intake   - Monitor closely for volume overload   - on levophed, BP noted, keep MAP > 65   - sono noted, no hydro   - corrected Ca, Mg at goal.   - CXR noted for new left lower lobe opacity, ? PNA   - CT abdomen noted for pancolitis   - on Cipro, flagyl and vanco PO   - GI eval   - repeat BMP, trend creatinine   - palliative care eval   - no acute indication for RRT at the moment.   - with advanced age and baseline status, would recommend against RRT if indicated.   - avoid nephrotoxins and hypotension   - will sign off recall PRN

## 2019-11-07 NOTE — DIETITIAN INITIAL EVALUATION ADULT. - ADD RECOMMEND
Recommendation: Order Ensure Pudding q24hrs + Prosource Gelatein Plus q24hrs. Monitor tolerance to diet advance - if swallowing difficulty observed/suspected, would recommend consulting SLP services. Diet texture/consistency per SLP. Order MVI (without minerals) q24hrs.

## 2019-11-07 NOTE — PROGRESS NOTE ADULT - SUBJECTIVE AND OBJECTIVE BOX
89yFemale with diagnosis: LARS ACUTE KIDNEY INJURY SEPSIS DIARRHEA      Patient seen sleeping comfortably, NAD     PAST MEDICAL & SURGICAL HISTORY:  Dyslipidemia  Hypertension  Atrial fibrillation  H/O abdominal hysterectomy  History of cholecystectomy          PHYSICAL EXAM    General:  Elderly frail female, NAD    Respiratory: No dyspnea noted    Cardiovascular: S1S2 RRR    Gastrointestinal: Soft, ND/NT    Extremities: No edema             T(C): , Max: 36 (16:13)  T(F): 96.8  HR: 132 (102 - 132)  BP: 111/68 (77/52 - 153/89)  RR: 18 (17 - 18)  SpO2: 98% (96% - 98%)                                    13.2   10.53 )-----------( 112      ( 07 Nov 2019 07:08 )             39.3                                                                                      11-07    144  |  102  |  66<HH>  ----------------------------<  105<H>  3.3<L>   |  28  |  1.8<H>    Ca    7.7<L>      07 Nov 2019 07:08  Mg     1.6     11-07    TPro  5.3<L>  /  Alb  2.3<L>  /  TBili  0.3  /  DBili  x   /  AST  17  /  ALT  7   /  AlkPhos  88  11-05                                                      MEDICATIONS  (STANDING):  chlorhexidine 4% Liquid 1 Application(s) Topical daily  diltiazem    Tablet 30 milliGRAM(s) Oral every 6 hours  heparin  Injectable 5000 Unit(s) SubCutaneous every 12 hours  lactated ringers. 1000 milliLiter(s) (75 mL/Hr) IV Continuous <Continuous>  metroNIDAZOLE  IVPB 500 milliGRAM(s) IV Intermittent every 8 hours  vancomycin    Solution 125 milliGRAM(s) Oral every 6 hours    MEDICATIONS  (PRN):

## 2019-11-07 NOTE — DIETITIAN INITIAL EVALUATION ADULT. - RD TO REMAIN AVAILABLE
Nutrition Intervention: Meals & Snacks, Medical Food Supplements, Coordination of Care, Vitamin & Mineral Supplements. Monitor: Energy intake, diet order, glucose profile, renal profile, nutrition focused physical findings./yes

## 2019-11-07 NOTE — PROGRESS NOTE ADULT - SUBJECTIVE AND OBJECTIVE BOX
Nephrology progress note    THIS IS AN INCOMPLETE NOTE . FULL NOTE TO FOLLOW SHORTLY    Patient is seen and examined, events over the last 24 h noted .    Allergies:  penicillin (Rash)    Hospital Medications:   MEDICATIONS  (STANDING):  chlorhexidine 4% Liquid 1 Application(s) Topical daily  heparin  Injectable 5000 Unit(s) SubCutaneous every 12 hours  lactated ringers. 1000 milliLiter(s) (75 mL/Hr) IV Continuous <Continuous>  magnesium sulfate  IVPB 2 Gram(s) IV Intermittent once  metroNIDAZOLE  IVPB 500 milliGRAM(s) IV Intermittent every 8 hours  potassium chloride   Powder 40 milliEquivalent(s) Oral once  potassium chloride  20 mEq/100 mL IVPB 20 milliEquivalent(s) IV Intermittent once  vancomycin    Solution 125 milliGRAM(s) Oral every 6 hours        VITALS:  T(F): 96.2 (19 @ 06:13), Max: 96.8 (19 @ 16:13)  HR: 117 (19 @ 06:13)  BP: 95/53 (19 @ 06:13)  RR: 17 (19 @ 06:13)  SpO2: 98% (19 @ 20:45)  Wt(kg): --     @ 07:01  -   @ 07:00  --------------------------------------------------------  IN: 0 mL / OUT: 1 mL / NET: -1 mL          PHYSICAL EXAM:  Constitutional: NAD  HEENT: anicteric sclera, oropharynx clear, MMM  Neck: No JVD  Respiratory: CTAB, no wheezes, rales or rhonchi  Cardiovascular: S1, S2, RRR  Gastrointestinal: BS+, soft, NT/ND  Extremities: No cyanosis or clubbing. No peripheral edema  :  No mcgregor.   Skin: No rashes    LABS:      144  |  102  |  66<HH>  ----------------------------<  105<H>  3.3<L>   |  28  |  1.8<H>  Creatinine Trend: 1.8<--, 2.8<--, 4.4<--, 5.6<--, 5.8<--, 7.7<--  Ca    7.7<L>      2019 07:08  Mg     1.6         TPro  5.3<L>  /  Alb  2.3<L>  /  TBili  0.3  /  DBili      /  AST  17  /  ALT  7   /  AlkPhos  88  11                          13.2   10.53 )-----------( 112      ( 2019 07:08 )             39.3       Urine Studies:  Urinalysis Basic - ( 2019 16:30 )    Color: Linda / Appearance: Slightly Turbid / S.023 / pH:   Gluc:  / Ketone: Negative  / Bili: Small / Urobili: <2 mg/dL   Blood:  / Protein: 30 mg/dL / Nitrite: Negative   Leuk Esterase: Negative / RBC: 6 /HPF / WBC 10 /HPF   Sq Epi:  / Non Sq Epi: 11 /HPF / Bacteria: Negative        RADIOLOGY & ADDITIONAL STUDIES: Nephrology progress note  Patient is seen and examined, events over the last 24 h noted .  lying in bed comfortable   Allergies:  penicillin (Rash)    Hospital Medications:   MEDICATIONS  (STANDING):    heparin  Injectable 5000 Unit(s) SubCutaneous every 12 hours  lactated ringers. 1000 milliLiter(s) (75 mL/Hr) IV Continuous <Continuous>  magnesium sulfate  IVPB 2 Gram(s) IV Intermittent once  metroNIDAZOLE  IVPB 500 milliGRAM(s) IV Intermittent every 8 hours  potassium chloride   Powder 40 milliEquivalent(s) Oral once  potassium chloride  20 mEq/100 mL IVPB 20 milliEquivalent(s) IV Intermittent once  vancomycin    Solution 125 milliGRAM(s) Oral every 6 hours        VITALS:  T(F): 96.2 (19 @ 06:13), Max: 96.8 (19 @ 16:13)  HR: 117 (19 @ 06:13)  BP: 95/53 (19 @ 06:13)  RR: 17 (19 @ 06:13)  SpO2: 98% (19 @ 20:45)  Wt(kg): --     @ 07:01  -   @ 07:00  --------------------------------------------------------  IN: 0 mL / OUT: 1 mL / NET: -1 mL          PHYSICAL EXAM:  Constitutional: NAD  HEENT: anicteric sclera, oropharynx clear, MMM  Neck: No JVD  Respiratory: CTAB, no wheezes, rales or rhonchi  Cardiovascular: S1, S2, RRR  Gastrointestinal: BS+, soft, NT/ND  Extremities: No cyanosis or clubbing. No peripheral edema  :  No mcgregor.   Skin: No rashes    LABS:      144  |  102  |  66<HH>  ----------------------------<  105<H>  3.3<L>   |  28  |  1.8<H>    Creatinine Trend: 1.8<--, 2.8<--, 4.4<--, 5.6<--, 5.8<--, 7.7<--    Ca    7.7<L>      2019 07:08  Mg     1.6         TPro  5.3<L>  /  Alb  2.3<L>  /  TBili  0.3  /  DBili      /  AST  17  /  ALT  7   /  AlkPhos  88                            13.2   10.53 )-----------( 112      ( 2019 07:08 )             39.3       Urine Studies:  Urinalysis Basic - ( 2019 16:30 )    Color: Linda / Appearance: Slightly Turbid / S.023 / pH:   Gluc:  / Ketone: Negative  / Bili: Small / Urobili: <2 mg/dL   Blood:  / Protein: 30 mg/dL / Nitrite: Negative   Leuk Esterase: Negative / RBC: 6 /HPF / WBC 10 /HPF   Sq Epi:  / Non Sq Epi: 11 /HPF / Bacteria: Negative        RADIOLOGY & ADDITIONAL STUDIES:

## 2019-11-07 NOTE — DIETITIAN INITIAL EVALUATION ADULT. - PHYSICAL APPEARANCE
Confused, disoriented. Last BM 11/5 (diarrhea noted). No chewing/swallowing difficulty noted at this time, however no intake observed with solid diet at this time. Skin: Stage II pressure ulcer to B/L buttocks.

## 2019-11-07 NOTE — PROGRESS NOTE ADULT - ASSESSMENT
89 year old lady DNI/DNR (per previous records), with history of afib, hypertension and DLD was brought in for altered mental status. Initially admitted to ICU for severe sepsis 2/2 colitis. Downgraded after clinical improvement    1. Severe Sepsis 2/2 C. Diff colitis: Cont vancomycin PO, cipro/flagyl, IVF. Palliative care following to clarify DNR/DNI status and GOC.  2. Afib: Currently off BB d/t sepsis  3. HTN: Holding BP meds d/t sepsis  4. LARS, pre-renal in setting of septic shock and diarrhea: Creatinine improving on IVF. Continue to monitor    GI/DVT PPX    #Progress Note Handoff  Pending (specify): Resolution of colitis and sepsis  Family discussion: Not able to be reached today  Disposition: TBD 89 year old lady DNI/DNR (per previous records), with history of afib, hypertension and DLD was brought in for altered mental status. Initially admitted to ICU for severe sepsis 2/2 colitis. Downgraded after clinical improvement    1. Severe Sepsis present on admission 2/2 C. Diff colitis: Pancolitis seen on CT abdomen/Pelvis. Cont vancomycin PO, cipro/flagyl, IVF. Palliative care following to clarify DNR/DNI status and GOC.  2. Afib: Currently off BB d/t sepsis  3. HTN: Holding BP meds d/t sepsis  4. LARS, pre-renal in setting of septic shock and diarrhea: Creatinine improving on IVF. Continue to monitor  5. Stage 2 bilateral decubiti ulcers, present on admission: Cont wound care    GI/DVT PPX    #Progress Note Handoff  Pending (specify): Resolution of diarrhea  Family discussion: Not able to be reached today  Disposition: TBD 89 year old lady DNI/DNR (per previous records), with history of paroxysmal afib, hypertension and DLD was brought in for altered mental status. Initially admitted to ICU for severe sepsis 2/2 colitis. Downgraded after clinical improvement    1. Severe Sepsis present on admission 2/2 C. Diff colitis: Pancolitis seen on CT abdomen/Pelvis. Cont vancomycin PO, cipro/flagyl, IVF. Palliative care following to clarify DNR/DNI status and GOC.  2. Paroxysmal Afib: Currently off BB d/t sepsis. Will resume home xarelto when diarrhea resolves  3. HTN: Holding BP meds d/t sepsis  4. LARS, pre-renal in setting of septic shock and diarrhea: Creatinine improving on IVF. Continue to monitor  5. Stage 2 bilateral decubiti buttocks ulcers, present on admission: Cont wound care  6. Metabolic Encephalopathy present on admission d/t sepsis, now back to baseline: Will continue to monitor    GI/DVT PPX    #Progress Note Handoff  Pending (specify): Resolution of diarrhea  Family discussion: Not able to be reached today  Disposition: TBD

## 2019-11-07 NOTE — PROGRESS NOTE ADULT - SUBJECTIVE AND OBJECTIVE BOX
STEPH DAI  89y, Female  Allergy: penicillin (Rash)    Hospital Day: 3d    Patient seen and examined earlier today. No acute events overnight.    PMH/PSH:  PAST MEDICAL & SURGICAL HISTORY:  Dyslipidemia  Hypertension  Atrial fibrillation  H/O abdominal hysterectomy  History of cholecystectomy    VITALS:  T(F): 96.2 (11-07-19 @ 06:13), Max: 96.8 (11-06-19 @ 16:13)  HR: 117 (11-07-19 @ 06:13)  BP: 95/53 (11-07-19 @ 06:13) (77/52 - 153/89)  RR: 17 (11-07-19 @ 06:13)  SpO2: 98% (11-06-19 @ 20:45)    TESTS & MEASUREMENTS:  Weight (Kg):     11-05-19 @ 07:01  -  11-06-19 @ 07:00  --------------------------------------------------------  IN: 0 mL / OUT: 1 mL / NET: -1 mL                        13.2   10.53 )-----------( 112      ( 07 Nov 2019 07:08 )             39.3     11-07    144  |  102  |  66<HH>  ----------------------------<  105<H>  3.3<L>   |  28  |  1.8<H>    Ca    7.7<L>      07 Nov 2019 07:08  Mg     1.6     11-07    TPro  5.3<L>  /  Alb  2.3<L>  /  TBili  0.3  /  DBili  x   /  AST  17  /  ALT  7   /  AlkPhos  88  11-05    LIVER FUNCTIONS - ( 05 Nov 2019 16:14 )  Alb: 2.3 g/dL / Pro: 5.3 g/dL / ALK PHOS: 88 U/L / ALT: 7 U/L / AST: 17 U/L / GGT: x           GI PCR Panel, Stool (collected 11-06-19 @ 15:00)  Source: .Stool Feces  Final Report (11-07-19 @ 03:16):    GI PCR Results: NOT detected    *******Please Note:*******    GI panel PCR evaluates for:    Campylobacter, Plesiomonas shigelloides, Salmonella,    Vibrio, Yersinia enterocolitica, Enteroaggregative    Escherichia coli (EAEC), Enteropathogenic E.coli (EPEC),    Enterotoxigenic E. coli (ETEC) lt/st, Shiga-like    toxin-producing E. coli (STEC) stx1/stx2,    Shigella/ Enteroinvasive E. coli (EIEC), Cryptosporidium,    Cyclospora cayetanensis, Entamoeba histolytica,    Giardia lamblia, Adenovirus F 40/41, Astrovirus,    Norovirus GI/GII, Rotavirus A, Sapovirus    Culture - Blood (collected 11-04-19 @ 18:00)  Source: .Blood Blood-Peripheral  Preliminary Report (11-06-19 @ 02:01):    No growth to date.    Culture - Blood (collected 11-04-19 @ 18:00)  Source: .Blood Blood-Peripheral  Preliminary Report (11-06-19 @ 02:01):    No growth to date.    Culture - Urine (collected 11-04-19 @ 16:30)  Source: .Urine Clean Catch (Midstream)  Final Report (11-06-19 @ 00:19):    No growth    RECENT DIAGNOSTIC ORDERS:  Diet, Dysphagia 1 Pureed-Thin Liquids (11-07-19 @ 08:51)    MEDICATIONS:  MEDICATIONS  (STANDING):  chlorhexidine 4% Liquid 1 Application(s) Topical daily  heparin  Injectable 5000 Unit(s) SubCutaneous every 12 hours  lactated ringers. 1000 milliLiter(s) (75 mL/Hr) IV Continuous <Continuous>  metroNIDAZOLE  IVPB 500 milliGRAM(s) IV Intermittent every 8 hours  vancomycin    Solution 125 milliGRAM(s) Oral every 6 hours    MEDICATIONS  (PRN):    HOME MEDICATIONS:  Calcium 500+D oral tablet, chewable (08-26)  ferrous sulfate 325 mg (65 mg elemental iron) oral tablet (08-26)  hydroCHLOROthiazide 12.5 mg oral tablet (08-26)  Xarelto 10 mg oral tablet (08-26)    REVIEW OF SYSTEMS:  All other review of systems is negative unless indicated above.     PHYSICAL EXAM:  GENERAL: NAD  HEENT: No Swelling  CHEST/LUNG: Good air entry, No wheezing  HEART: RRR, No murmurs  ABDOMEN: Soft, Bowel sounds present  EXTREMITIES:  No clubbing

## 2019-11-07 NOTE — PROGRESS NOTE ADULT - SUBJECTIVE AND OBJECTIVE BOX
STEPH DAI 89y Female  MRN#: [id number]     SUBJECTIVE  Patient is a 89y old Female who presents with a chief complaint of Altered mental status (06 Nov 2019 17:24)    Today is hospital day 3d, and this morning she is lying in bed without distress.   No acute overnight events.     OBJECTIVE  PAST MEDICAL & SURGICAL HISTORY  Dyslipidemia  Hypertension  Atrial fibrillation  H/O abdominal hysterectomy  History of cholecystectomy    ALLERGIES:  penicillin (Rash)    MEDICATIONS:  STANDING MEDICATIONS  chlorhexidine 4% Liquid 1 Application(s) Topical daily  heparin  Injectable 5000 Unit(s) SubCutaneous every 12 hours  lactated ringers. 1000 milliLiter(s) IV Continuous <Continuous>  magnesium sulfate  IVPB 2 Gram(s) IV Intermittent once  metroNIDAZOLE  IVPB 500 milliGRAM(s) IV Intermittent every 8 hours  potassium chloride   Powder 40 milliEquivalent(s) Oral once  potassium chloride  20 mEq/100 mL IVPB 20 milliEquivalent(s) IV Intermittent once  vancomycin    Solution 125 milliGRAM(s) Oral every 6 hours    PRN MEDICATIONS    HOME MEDICATIONS  Home Medications:  Calcium 500+D oral tablet, chewable: 1 tab(s) orally 3 times a day (26 Aug 2018 02:13)  ferrous sulfate 325 mg (65 mg elemental iron) oral tablet: 1 tab(s) orally 2 times a day (26 Aug 2018 02:14)  hydroCHLOROthiazide 12.5 mg oral tablet: 1 tab(s) orally once a day (26 Aug 2018 02:13)  Xarelto 10 mg oral tablet: 1 tab(s) orally once a day (26 Aug 2018 02:13)      VITAL SIGNS: Last 24 Hours  T(C): 35.7 (07 Nov 2019 06:13), Max: 36 (06 Nov 2019 16:13)  T(F): 96.2 (07 Nov 2019 06:13), Max: 96.8 (06 Nov 2019 16:13)  HR: 117 (07 Nov 2019 06:13) (102 - 117)  BP: 95/53 (07 Nov 2019 06:13) (77/52 - 153/89)  BP(mean): --  RR: 17 (07 Nov 2019 06:13) (17 - 18)  SpO2: 98% (06 Nov 2019 20:45) (96% - 98%)      LABS:                        13.2   10.53 )-----------( 112      ( 07 Nov 2019 07:08 )             39.3     11-07    144  |  102  |  66<HH>  ----------------------------<  105<H>  3.3<L>   |  28  |  1.8<H>    Ca    7.7<L>      07 Nov 2019 07:08  Mg     1.6     11-07    TPro  5.3<L>  /  Alb  2.3<L>  /  TBili  0.3  /  DBili  x   /  AST  17  /  ALT  7   /  AlkPhos  88  11-05    LIVER FUNCTIONS - ( 05 Nov 2019 16:14 )  Alb: 2.3 g/dL / Pro: 5.3 g/dL / ALK PHOS: 88 U/L / ALT: 7 U/L / AST: 17 U/L / GGT: x                     GI PCR Panel, Stool (collected 06 Nov 2019 15:00)  Source: .Stool Feces  Final Report (07 Nov 2019 03:16):    GI PCR Results: NOT detected    *******Please Note:*******    GI panel PCR evaluates for:    Campylobacter, Plesiomonas shigelloides, Salmonella,    Vibrio, Yersinia enterocolitica, Enteroaggregative    Escherichia coli (EAEC), Enteropathogenic E.coli (EPEC),    Enterotoxigenic E. coli (ETEC) lt/st, Shiga-like    toxin-producing E. coli (STEC) stx1/stx2,    Shigella/ Enteroinvasive E. coli (EIEC), Cryptosporidium,    Cyclospora cayetanensis, Entamoeba histolytica,    Giardia lamblia, Adenovirus F 40/41, Astrovirus,    Norovirus GI/GII, Rotavirus A, Sapovirus    Culture - Blood (collected 04 Nov 2019 18:00)  Source: .Blood Blood-Peripheral  Preliminary Report (06 Nov 2019 02:01):    No growth to date.    Culture - Blood (collected 04 Nov 2019 18:00)  Source: .Blood Blood-Peripheral  Preliminary Report (06 Nov 2019 02:01):    No growth to date.    Culture - Urine (collected 04 Nov 2019 16:30)  Source: .Urine Clean Catch (Midstream)  Final Report (06 Nov 2019 00:19):    No growth    CAPILLARY BLOOD GLUCOSE          RADIOLOGY:    PHYSICAL EXAM:  PHYSICAL EXAM:  GENERAL: NAD, AAO x 4, 89y F  HEAD:  Atraumatic, Normocephalic  EYES: EOMI, conjunctiva clear and sclera white  NECK: Supple, No JVD  CHEST/LUNG: Clear to auscultation bilaterally; No wheeze; No crackles; No accessory muscles used  HEART: Regular rate and rhythm; No murmurs;   ABDOMEN: Soft, Nontender, Nondistended; Bowel sounds present; No guarding  EXTREMITIES:  2+ Peripheral Pulses, No cyanosis or edema  NEUROLOGY: non-focal    ADMISSION SUMMARY  Patient is a 89y old Female who presents with a chief complaint of Altered mental status (06 Nov 2019 17:24) STEPH DAI 89y Female  MRN#: [id number]     SUBJECTIVE  Patient is a 89y old Female who presents with a chief complaint of Altered mental status (06 Nov 2019 17:24)    Today is hospital day 3d, and this morning she is lying in bed without distress.   No acute overnight events.     OBJECTIVE  PAST MEDICAL & SURGICAL HISTORY  Dyslipidemia  Hypertension  Atrial fibrillation  H/O abdominal hysterectomy  History of cholecystectomy    ALLERGIES:  penicillin (Rash)    MEDICATIONS:  STANDING MEDICATIONS  chlorhexidine 4% Liquid 1 Application(s) Topical daily  heparin  Injectable 5000 Unit(s) SubCutaneous every 12 hours  lactated ringers. 1000 milliLiter(s) IV Continuous <Continuous>  magnesium sulfate  IVPB 2 Gram(s) IV Intermittent once  metroNIDAZOLE  IVPB 500 milliGRAM(s) IV Intermittent every 8 hours  potassium chloride   Powder 40 milliEquivalent(s) Oral once  potassium chloride  20 mEq/100 mL IVPB 20 milliEquivalent(s) IV Intermittent once  vancomycin    Solution 125 milliGRAM(s) Oral every 6 hours    PRN MEDICATIONS    HOME MEDICATIONS  Home Medications:  Calcium 500+D oral tablet, chewable: 1 tab(s) orally 3 times a day (26 Aug 2018 02:13)  ferrous sulfate 325 mg (65 mg elemental iron) oral tablet: 1 tab(s) orally 2 times a day (26 Aug 2018 02:14)  hydroCHLOROthiazide 12.5 mg oral tablet: 1 tab(s) orally once a day (26 Aug 2018 02:13)  Xarelto 10 mg oral tablet: 1 tab(s) orally once a day (26 Aug 2018 02:13)      VITAL SIGNS: Last 24 Hours  T(C): 35.7 (07 Nov 2019 06:13), Max: 36 (06 Nov 2019 16:13)  T(F): 96.2 (07 Nov 2019 06:13), Max: 96.8 (06 Nov 2019 16:13)  HR: 117 (07 Nov 2019 06:13) (102 - 117)  BP: 95/53 (07 Nov 2019 06:13) (77/52 - 153/89)  BP(mean): --  RR: 17 (07 Nov 2019 06:13) (17 - 18)  SpO2: 98% (06 Nov 2019 20:45) (96% - 98%)      LABS:                        13.2   10.53 )-----------( 112      ( 07 Nov 2019 07:08 )             39.3     11-07    144  |  102  |  66<HH>  ----------------------------<  105<H>  3.3<L>   |  28  |  1.8<H>    Ca    7.7<L>      07 Nov 2019 07:08  Mg     1.6     11-07    TPro  5.3<L>  /  Alb  2.3<L>  /  TBili  0.3  /  DBili  x   /  AST  17  /  ALT  7   /  AlkPhos  88  11-05    LIVER FUNCTIONS - ( 05 Nov 2019 16:14 )  Alb: 2.3 g/dL / Pro: 5.3 g/dL / ALK PHOS: 88 U/L / ALT: 7 U/L / AST: 17 U/L / GGT: x           GI PCR Panel, Stool (collected 06 Nov 2019 15:00)  Source: .Stool Feces  Final Report (07 Nov 2019 03:16):    GI PCR Results: NOT detected    *******Please Note:*******    GI panel PCR evaluates for:    Campylobacter, Plesiomonas shigelloides, Salmonella,    Vibrio, Yersinia enterocolitica, Enteroaggregative    Escherichia coli (EAEC), Enteropathogenic E.coli (EPEC),    Enterotoxigenic E. coli (ETEC) lt/st, Shiga-like    toxin-producing E. coli (STEC) stx1/stx2,    Shigella/ Enteroinvasive E. coli (EIEC), Cryptosporidium,    Cyclospora cayetanensis, Entamoeba histolytica,    Giardia lamblia, Adenovirus F 40/41, Astrovirus,    Norovirus GI/GII, Rotavirus A, Sapovirus    Culture - Blood (collected 04 Nov 2019 18:00)  Source: .Blood Blood-Peripheral  Preliminary Report (06 Nov 2019 02:01):    No growth to date.    Culture - Blood (collected 04 Nov 2019 18:00)  Source: .Blood Blood-Peripheral  Preliminary Report (06 Nov 2019 02:01):    No growth to date.    Culture - Urine (collected 04 Nov 2019 16:30)  Source: .Urine Clean Catch (Midstream)  Final Report (06 Nov 2019 00:19):    No growth    CAPILLARY BLOOD GLUCOSE      RADIOLOGY:  < from: Xray Chest 1 View- PORTABLE-Routine (11.06.19 @ 08:12) >  IMPRESSION:    New left lower lobe opacity/effusion.  < end of copied text >    PHYSICAL EXAM:  GENERAL: NAD, AAO x 1 (aware of location), 89y F, elderly, bedbounded   HEAD:  Atraumatic, Normocephalic  EYES: EOMI, conjunctiva clear and sclera white  NECK: Supple, No JVD  CHEST/LUNG: Clear to auscultation bilaterally; No wheeze; No crackles; No accessory muscles used  HEART: Regular rate and rhythm; No murmurs;   ABDOMEN: Soft, Nontender, Nondistended; Bowel sounds present; No guarding  EXTREMITIES:  2+ Peripheral Pulses, No cyanosis or edema  NEUROLOGY: non-focal    ADMISSION SUMMARY  Patient is a 89y old Female who presents with a chief complaint of Altered mental status (06 Nov 2019 17:24)

## 2019-11-07 NOTE — CONSULT NOTE ADULT - ASSESSMENT
IMPRESSION: Rehab of Debilitation     PRECAUTIONS: [  x  ] Cardiac  [    ] Respiratory  [    ] Seizures [    ] Contact Isolation  [    ] Droplet Isolation  [    ] Other    Weight Bearing Status:     RECOMMENDATION:    Out of Bed to Chair     DVT/Decubiti Prophylaxis    REHAB PLAN:     [ x    ] Bedside P/T 3-5 times a week   [     ] Bedside O/T  2-3 times a week   [     ] No Rehab Therapy Indicated   [     ]  Speech Therapy   Conditioning/ROM                                 ADL  Bed Mobility                                            Conditioning/ROM  Transfers                                                  Bed Mobility  Sitting /Standing Balance                      Transfers                                        Gait Training                                            Sitting/Standing Balance  Stair Training [   ]Applicable                 Home equipment Eval                                                                     Splinting  [   ] Only      GOALS:   ADL   [    ]   Independent         Transfers  [    ] Independent            Ambulation  [     ] Independent     [  x   ] With device                            [    ]  CG                                               [    ]  CG                                                    [     ] CG                            [ x   ] Min A                                          [  x  ] Min A                                                [   x  ] Min  A          DISCHARGE PLAN:   [     ]  Good candidate for Intensive Rehabilitation/Hospital based                                             Will tolerate 3hrs Intensive Rehab Daily                                       [   x   ]  Short Term Rehab in Skilled Nursing Facility                                VS                                     [    x  ]  Home with Outpatient or VN services HAS 10 HR HHA                                         [      ]  Possible Candidate for Intensive Hospital based Rehab

## 2019-11-07 NOTE — PROGRESS NOTE ADULT - ASSESSMENT
This is a 89 y/o female with PMH of HTN, DLD, AFIB who was admitted for septic shock 2/2 pan colitis. Patient was recently discharged for similar problem (severe sepsis 2/2 colitis.)     Patient was sleeping comfortably in bed, no s/s of pain or distress noted. Palliative NP d/w resident, and resident will get into with patient's daughter to clarify code status and provide medical update. Palliative will be available to be part of the family meeting when scheduled by primary team                     Recommendations:  Ongoing medical mgt  we will f/u           Please Call [phone] PRN

## 2019-11-07 NOTE — DIETITIAN INITIAL EVALUATION ADULT. - DIET TYPE
Dysphagia 1 Pureed with thin liquids. Diet advanced today from clear liquids - no meal trays provided at this time. Will monitor.

## 2019-11-07 NOTE — DIETITIAN INITIAL EVALUATION ADULT. - ENERGY NEEDS
Energy: 7156-7045 kcal/day (30-35 kcal/kg ABW)    Protein: 63-75 g/day (1.25-1.5 g/kg ABW)    Fluids: 1 mL/kcal

## 2019-11-08 NOTE — PROGRESS NOTE ADULT - ASSESSMENT
89 year old lady DNI/DNR (per previous records), with history of paroxysmal afib, hypertension and DLD was brought in for altered mental status. Initially admitted to ICU for severe sepsis 2/2 colitis. Downgraded after clinical improvement    1. Severe Sepsis present on admission 2/2 C. Diff colitis: Pancolitis seen on CT abdomen/Pelvis. Cont vancomycin PO, cipro/flagyl, IVF. DC planning  2. Paroxysmal Afib: Plan to resume xarelto and cardizem  3. HTN: Cont cardizem, will switch to long-acting on discharge.  4. LARS, pre-renal in setting of septic shock and diarrhea: Creatinine improving on IVF. Continue to monitor  5. Stage 2 bilateral decubiti buttocks ulcers, present on admission: Cont wound care  6. Metabolic Encephalopathy present on admission d/t sepsis, now back to baseline: Will continue to monitor    GI/DVT PPX    #Progress Note Handoff  Pending (specify): DC planning  Family discussion: Not able to be reached today  Disposition: TBD

## 2019-11-08 NOTE — PHYSICAL THERAPY INITIAL EVALUATION ADULT - GAIT DEVIATIONS NOTED, PT EVAL
flexed posture/decreased helder/decreased velocity of limb motion/decreased step length/decreased stride length

## 2019-11-08 NOTE — PHYSICAL THERAPY INITIAL EVALUATION ADULT - GENERAL OBSERVATIONS, REHAB EVAL
[id number] am Chart reviewed. Pt. seen semirecline in bed , in No apparent distress , + IV lock, contact isolation, appears confused however was following single step instructions.

## 2019-11-08 NOTE — PROGRESS NOTE ADULT - ASSESSMENT
89 yo F from senior resident w/ PMH of afib, HTN and DLD was brought in to hospital for altered mental status. The aid reported decreased appetite and frequent, watery bowel movements.     # AMS secondary to metabolic encephalopathy from septic shock  # Septic shock secondary to Pan colitis vs C. diff, resolved  - CT noted, pan colitis, lactic acid trending down, leukocytosis  - c/w w/ pureed diet   - Vanco PO and flagyl IV  - GI PCR negative, C diff positive, stool cx negative     # LARS likely prerenal secondary to septic shock, improved   # Hypernatremia likely due to poor PO intake   - baseline Cr 0.7, admission Cr 7.7  - creatinine is 1.3 today  - c/w LR at 75 and PO intake   - accurate intake/output     # Mild elevation in troponin likely type II demand ischemia, stable   - asymptomatic   - EKG shows afib  - echo unremarkable    # Atrial fibrillation  - resume cardizem at 30mg q6h with parameter   - resume AC     # HTN, resolved  - hold all anti-hypertensive medications    Diet: Advance to pureed diet today   GI ppx: N/A  DVT ppx: Heparin 5000 q12h    Activity: Increase as tolerated  Code status: Full Code (  ) / DNR ( X ) / DNI ( X )  Disposition: from home, requires IV abx 89 yo F from senior resident w/ PMH of afib, HTN and DLD was brought in to hospital for altered mental status. The aid reported decreased appetite and frequent, watery bowel movements.     # AMS secondary to metabolic encephalopathy from septic shock  # Septic shock secondary to Pan colitis vs C. diff, resolved  - CT noted, pan colitis, lactic acid trending down, leukocytosis  - c/w w/ pureed diet   - Vanco PO and flagyl IV  - GI PCR negative, C diff positive, stool cx negative     # LARS likely prerenal secondary to septic shock, improved   # Hypernatremia likely due to poor PO intake   - baseline Cr 0.7, admission Cr 7.7  - creatinine is 1.3 today  - c/w LR at 75 and PO intake   - accurate intake/output     # Mild elevation in troponin likely type II demand ischemia, stable   - asymptomatic   - EKG shows afib  - echo unremarkable    # Atrial fibrillation  - resume cardizem at 30mg q6h with parameter   - resume AC     # HTN, resolved  - hold all anti-hypertensive medications    Diet: pureed diet   GI ppx: N/A  DVT ppx: Xarelto  Activity: Increase as tolerated, pending PT/Rehab eval  Code status: Full Code (  ) / DNR ( X ) / DNI ( X )  Disposition: from home, discharge planning

## 2019-11-08 NOTE — PROGRESS NOTE ADULT - SUBJECTIVE AND OBJECTIVE BOX
STEPH DAI  89y, Female  Allergy: penicillin (Rash)    Hospital Day: 4d    Patient seen and examined earlier today. No acute events overnight.    PMH/PSH:  PAST MEDICAL & SURGICAL HISTORY:  Dyslipidemia  Hypertension  Atrial fibrillation  H/O abdominal hysterectomy  History of cholecystectomy    VITALS:  T(F): 97.5 (11-08-19 @ 05:50), Max: 97.5 (11-08-19 @ 05:50)  HR: 106 (11-08-19 @ 05:50)  BP: 104/61 (11-08-19 @ 05:50) (97/56 - 115/72)  RR: 17 (11-08-19 @ 05:50)  SpO2: --    TESTS & MEASUREMENTS:  Weight (Kg):       11-08-19 @ 07:01  -  11-08-19 @ 12:12  --------------------------------------------------------  IN: 0 mL / OUT: 2 mL / NET: -2 mL                        13.1   8.33  )-----------( 117      ( 08 Nov 2019 04:30 )             40.4     11-08    149<H>  |  109  |  51<H>  ----------------------------<  119<H>  3.9   |  27  |  1.3    Ca    7.9<L>      08 Nov 2019 04:30  Mg     2.2     11-08    TPro  5.3<L>  /  Alb  2.4<L>  /  TBili  0.3  /  DBili  x   /  AST  24  /  ALT  10  /  AlkPhos  74  11-08    LIVER FUNCTIONS - ( 08 Nov 2019 04:30 )  Alb: 2.4 g/dL / Pro: 5.3 g/dL / ALK PHOS: 74 U/L / ALT: 10 U/L / AST: 24 U/L / GGT: x           GI PCR Panel, Stool (collected 11-06-19 @ 15:00)  Source: .Stool Feces  Final Report (11-07-19 @ 03:16):    GI PCR Results: NOT detected    *******Please Note:*******    GI panel PCR evaluates for:    Campylobacter, Plesiomonas shigelloides, Salmonella,    Vibrio, Yersinia enterocolitica, Enteroaggregative    Escherichia coli (EAEC), Enteropathogenic E.coli (EPEC),    Enterotoxigenic E. coli (ETEC) lt/st, Shiga-like    toxin-producing E. coli (STEC) stx1/stx2,    Shigella/ Enteroinvasive E. coli (EIEC), Cryptosporidium,    Cyclospora cayetanensis, Entamoeba histolytica,    Giardia lamblia, Adenovirus F 40/41, Astrovirus,    Norovirus GI/GII, Rotavirus A, Sapovirus    Culture - Stool (collected 11-06-19 @ 15:00)  Source: .Stool Feces  Preliminary Report (11-07-19 @ 18:32):    No enteric pathogens to date: Final culture pending    No enteric gram negative rods isolated    Culture - Blood (collected 11-04-19 @ 18:00)  Source: .Blood Blood-Peripheral  Preliminary Report (11-06-19 @ 02:01):    No growth to date.    Culture - Blood (collected 11-04-19 @ 18:00)  Source: .Blood Blood-Peripheral  Preliminary Report (11-06-19 @ 02:01):    No growth to date.    Culture - Urine (collected 11-04-19 @ 16:30)  Source: .Urine Clean Catch (Midstream)  Final Report (11-06-19 @ 00:19):    No growth    RECENT DIAGNOSTIC ORDERS:  12 Lead ECG (11-07-19 @ 13:56)  [facility] Duplex Upper Ext Vein Scan, Left: Routine   Indication: swelling  Transport: Stretcher-Crib (11-07-19 @ 20:16)  Complete Blood Count + Automated Diff: AM Sched. Collection: 09-Nov-2019 04:30 (11-08-19 @ 10:51)  Comprehensive Metabolic Panel: AM Sched. Collection: 09-Nov-2019 04:30 (11-08-19 @ 10:51)  Magnesium, Serum: AM Sched. Collection: 09-Nov-2019 04:30 (11-08-19 @ 10:51)  Complete Blood Count + Automated Diff: AM Sched. Collection: 10-Nov-2019 04:30 (11-08-19 @ 10:51)  Complete Blood Count + Automated Diff: AM Sched. Collection: 11-Nov-2019 04:30 (11-08-19 @ 10:51)  Comprehensive Metabolic Panel: AM Sched. Collection: 10-Nov-2019 04:30 (11-08-19 @ 10:51)  Comprehensive Metabolic Panel: AM Sched. Collection: 11-Nov-2019 04:30 (11-08-19 @ 10:51)  Magnesium, Serum: AM Sched. Collection: 10-Nov-2019 04:30 (11-08-19 @ 10:51)  Magnesium, Serum: AM Sched. Collection: 11-Nov-2019 04:30 (11-08-19 @ 10:51)    MEDICATIONS:  MEDICATIONS  (STANDING):  chlorhexidine 4% Liquid 1 Application(s) Topical daily  diltiazem    Tablet 30 milliGRAM(s) Oral every 6 hours  lactated ringers. 1000 milliLiter(s) (75 mL/Hr) IV Continuous <Continuous>  metroNIDAZOLE  IVPB 500 milliGRAM(s) IV Intermittent every 8 hours  rivaroxaban 10 milliGRAM(s) Oral with dinner  vancomycin    Solution 125 milliGRAM(s) Oral every 6 hours    MEDICATIONS  (PRN):    HOME MEDICATIONS:  Calcium 500+D oral tablet, chewable (08-26)  ferrous sulfate 325 mg (65 mg elemental iron) oral tablet (08-26)  hydroCHLOROthiazide 12.5 mg oral tablet (08-26)  Xarelto 10 mg oral tablet (08-26)      REVIEW OF SYSTEMS:  All other review of systems is negative unless indicated above.     PHYSICAL EXAM:  GENERAL: NAD  HEENT: No Swelling  CHEST/LUNG: Good air entry, No wheezing  HEART: RRR, No murmurs  ABDOMEN: Soft, Bowel sounds present  EXTREMITIES:  No clubbing

## 2019-11-08 NOTE — PROGRESS NOTE ADULT - SUBJECTIVE AND OBJECTIVE BOX
STEPH DAI 89y Female  MRN#: [id number]     SUBJECTIVE  Patient is a 89y old Female who presents with a chief complaint of Altered mental status (07 Nov 2019 16:22)    Today is hospital day 4d, and this morning she is lying in bed without distress. Unable to reach daughter yesterday. No acute overnight events.     OBJECTIVE  PAST MEDICAL & SURGICAL HISTORY  Dyslipidemia  Hypertension  Atrial fibrillation  H/O abdominal hysterectomy  History of cholecystectomy    ALLERGIES:  penicillin (Rash)    MEDICATIONS:  STANDING MEDICATIONS  chlorhexidine 4% Liquid 1 Application(s) Topical daily  diltiazem    Tablet 30 milliGRAM(s) Oral every 6 hours  lactated ringers. 1000 milliLiter(s) IV Continuous <Continuous>  metroNIDAZOLE  IVPB 500 milliGRAM(s) IV Intermittent every 8 hours  rivaroxaban 10 milliGRAM(s) Oral with dinner  vancomycin    Solution 125 milliGRAM(s) Oral every 6 hours    PRN MEDICATIONS    HOME MEDICATIONS  Home Medications:  Calcium 500+D oral tablet, chewable: 1 tab(s) orally 3 times a day (26 Aug 2018 02:13)  ferrous sulfate 325 mg (65 mg elemental iron) oral tablet: 1 tab(s) orally 2 times a day (26 Aug 2018 02:14)  hydroCHLOROthiazide 12.5 mg oral tablet: 1 tab(s) orally once a day (26 Aug 2018 02:13)  Xarelto 10 mg oral tablet: 1 tab(s) orally once a day (26 Aug 2018 02:13)      VITAL SIGNS: Last 24 Hours  T(C): 36.4 (08 Nov 2019 05:50), Max: 36.4 (08 Nov 2019 05:50)  T(F): 97.5 (08 Nov 2019 05:50), Max: 97.5 (08 Nov 2019 05:50)  HR: 106 (08 Nov 2019 05:50) (71 - 132)  BP: 104/61 (08 Nov 2019 05:50) (97/56 - 115/72)  BP(mean): --  RR: 17 (08 Nov 2019 05:50) (17 - 18)  SpO2: --    11-08-19 @ 07:01  -  11-08-19 @ 10:05  --------------------------------------------------------  IN: 0 mL / OUT: 1 mL / NET: -1 mL        LABS:                        13.1   8.33  )-----------( 117      ( 08 Nov 2019 04:30 )             40.4     11-08    149<H>  |  109  |  51<H>  ----------------------------<  119<H>  3.9   |  27  |  1.3    Ca    7.9<L>      08 Nov 2019 04:30  Mg     2.2     11-08    TPro  5.3<L>  /  Alb  2.4<L>  /  TBili  0.3  /  DBili  x   /  AST  24  /  ALT  10  /  AlkPhos  74  11-08    LIVER FUNCTIONS - ( 08 Nov 2019 04:30 )  Alb: 2.4 g/dL / Pro: 5.3 g/dL / ALK PHOS: 74 U/L / ALT: 10 U/L / AST: 24 U/L / GGT: x           GI PCR Panel, Stool (collected 06 Nov 2019 15:00)  Source: .Stool Feces  Final Report (07 Nov 2019 03:16):    GI PCR Results: NOT detected    *******Please Note:*******    GI panel PCR evaluates for:    Campylobacter, Plesiomonas shigelloides, Salmonella,    Vibrio, Yersinia enterocolitica, Enteroaggregative    Escherichia coli (EAEC), Enteropathogenic E.coli (EPEC),    Enterotoxigenic E. coli (ETEC) lt/st, Shiga-like    toxin-producing E. coli (STEC) stx1/stx2,    Shigella/ Enteroinvasive E. coli (EIEC), Cryptosporidium,    Cyclospora cayetanensis, Entamoeba histolytica,    Giardia lamblia, Adenovirus F 40/41, Astrovirus,    Norovirus GI/GII, Rotavirus A, Sapovirus    Culture - Stool (collected 06 Nov 2019 15:00)  Source: .Stool Feces  Preliminary Report (07 Nov 2019 18:32):    No enteric pathogens to date: Final culture pending    No enteric gram negative rods isolated    CAPILLARY BLOOD GLUCOSE      RADIOLOGY:    < from: [facility] Duplex Lower Ext Vein Scan, Bilat (11.06.19 @ 18:48) >  Impression:  No evidence of deep venous thrombosis or superficial thrombophlebitis in   bilateral lower extremities.  < end of copied text >    PHYSICAL EXAM:  GENERAL: NAD, AAO x 2, 89y F  HEAD:  Atraumatic, Normocephalic  EYES: EOMI, conjunctiva clear and sclera white  NECK: Supple, No JVD  CHEST/LUNG: Clear to auscultation bilaterally; No wheeze; No crackles; No accessory muscles used  HEART: Regular rate and rhythm; No murmurs;   ABDOMEN: Soft, Nontender, Nondistended; Bowel sounds present; No guarding  EXTREMITIES:  2+ Peripheral Pulses, No cyanosis or edema  NEUROLOGY: non-focal    ADMISSION SUMMARY  Patient is a 89y old Female who presents with a chief complaint of Altered mental status (07 Nov 2019 16:22)

## 2019-11-09 NOTE — PROGRESS NOTE ADULT - SUBJECTIVE AND OBJECTIVE BOX
STEPH DAI  89y, Female  Allergy: penicillin (Rash)    Hospital Day: 5d    Patient seen and examined earlier today. No acute events overnight.    PMH/PSH:  PAST MEDICAL & SURGICAL HISTORY:  Dyslipidemia  Hypertension  Atrial fibrillation  H/O abdominal hysterectomy  History of cholecystectomy    VITALS:  T(F): 97.1 (11-09-19 @ 06:17), Max: 97.9 (11-08-19 @ 14:14)  HR: 110 (11-09-19 @ 06:17)  BP: 107/68 (11-09-19 @ 06:17) (99/62 - 107/68)  RR: 17 (11-09-19 @ 06:17)  SpO2: --    TESTS & MEASUREMENTS:  Weight (Kg):       11-08-19 @ 07:01  -  11-09-19 @ 07:00  --------------------------------------------------------  IN: 0 mL / OUT: 4 mL / NET: -4 mL                            12.3   6.59  )-----------( 113      ( 09 Nov 2019 07:45 )             38.3       11-09    148<H>  |  110  |  35<H>  ----------------------------<  118<H>  3.7   |  29  |  1.0    Ca    7.6<L>      09 Nov 2019 07:45  Mg     1.7     11-09    TPro  4.7<L>  /  Alb  2.2<L>  /  TBili  0.3  /  DBili  x   /  AST  16  /  ALT  7   /  AlkPhos  54  11-09    LIVER FUNCTIONS - ( 09 Nov 2019 07:45 )  Alb: 2.2 g/dL / Pro: 4.7 g/dL / ALK PHOS: 54 U/L / ALT: 7 U/L / AST: 16 U/L / GGT: x           GI PCR Panel, Stool (collected 11-06-19 @ 15:00)  Source: .Stool Feces  Final Report (11-07-19 @ 03:16):    GI PCR Results: NOT detected    *******Please Note:*******    GI panel PCR evaluates for:    Campylobacter, Plesiomonas shigelloides, Salmonella,    Vibrio, Yersinia enterocolitica, Enteroaggregative    Escherichia coli (EAEC), Enteropathogenic E.coli (EPEC),    Enterotoxigenic E. coli (ETEC) lt/st, Shiga-like    toxin-producing E. coli (STEC) stx1/stx2,    Shigella/ Enteroinvasive E. coli (EIEC), Cryptosporidium,    Cyclospora cayetanensis, Entamoeba histolytica,    Giardia lamblia, Adenovirus F 40/41, Astrovirus,    Norovirus GI/GII, Rotavirus A, Sapovirus    Culture - Stool (collected 11-06-19 @ 15:00)  Source: .Stool Feces  Final Report (11-08-19 @ 16:40):    No enteric pathogens isolated.    (Stool culture examined for Salmonella,    Shigella, Campylobacter, Aeromonas, Plesiomonas,    Vibrio, E.coli O157 and Yersinia)    Culture - Blood (collected 11-04-19 @ 18:00)  Source: .Blood Blood-Peripheral  Preliminary Report (11-06-19 @ 02:01):    No growth to date.    Culture - Blood (collected 11-04-19 @ 18:00)  Source: .Blood Blood-Peripheral  Preliminary Report (11-06-19 @ 02:01):    No growth to date.    Culture - Urine (collected 11-04-19 @ 16:30)  Source: .Urine Clean Catch (Midstream)  Final Report (11-06-19 @ 00:19):    No growth    MEDICATIONS:  MEDICATIONS  (STANDING):  chlorhexidine 4% Liquid 1 Application(s) Topical daily  diltiazem    Tablet 30 milliGRAM(s) Oral every 6 hours  lactated ringers. 1000 milliLiter(s) (75 mL/Hr) IV Continuous <Continuous>  metroNIDAZOLE  IVPB 500 milliGRAM(s) IV Intermittent every 8 hours  rivaroxaban 10 milliGRAM(s) Oral with dinner  vancomycin    Solution 125 milliGRAM(s) Oral every 6 hours    MEDICATIONS  (PRN):    HOME MEDICATIONS:  Calcium 500+D oral tablet, chewable (08-26)  ferrous sulfate 325 mg (65 mg elemental iron) oral tablet (08-26)  hydroCHLOROthiazide 12.5 mg oral tablet (08-26)  Xarelto 10 mg oral tablet (08-26)    REVIEW OF SYSTEMS:  All other review of systems is negative unless indicated above.     PHYSICAL EXAM:  GENERAL: NAD  HEENT: No Swelling  CHEST/LUNG: Good air entry, No wheezing  HEART: RRR, No murmurs  ABDOMEN: Soft, Bowel sounds present  EXTREMITIES:  No clubbing

## 2019-11-09 NOTE — PROGRESS NOTE ADULT - ASSESSMENT
89 year old lady DNI/DNR (per previous records), with history of paroxysmal afib, hypertension and DLD was brought in for altered mental status. Initially admitted to ICU for severe sepsis 2/2 colitis. Downgraded after clinical improvement    1. Severe Sepsis present on admission 2/2 C. Diff colitis: Pancolitis seen on CT abdomen/Pelvis. Cont vancomycin PO. DC planning  2. Paroxysmal Afib: On xarelto and cardizem  3. HTN: Cont cardizem  4. LARS, pre-renal in setting of septic shock and diarrhea: Creatinine improving on IVF. Continue to monitor  5. Stage 2 bilateral decubiti buttocks ulcers, present on admission: Cont wound care  6. Metabolic Encephalopathy present on admission d/t sepsis, now back to baseline: Will continue to monitor    GI/DVT PPX    #Progress Note Handoff  Pending (specify): DC planning  Family discussion: Pending  Disposition: TBD

## 2019-11-10 NOTE — PROGRESS NOTE ADULT - SUBJECTIVE AND OBJECTIVE BOX
STEPH DAI  89y, Female  Allergy: penicillin (Rash)    Hospital Day: 6d    Patient seen and examined earlier today. No acute events overnight.    PMH/PSH:  PAST MEDICAL & SURGICAL HISTORY:  Dyslipidemia  Hypertension  Atrial fibrillation  H/O abdominal hysterectomy  History of cholecystectomy    VITALS:  T(F): 97.1 (11-10-19 @ 05:42), Max: 97.1 (11-10-19 @ 05:42)  HR: 115 (11-10-19 @ 05:42)  BP: 106/77 (11-10-19 @ 05:42) (105/56 - 113/63)  RR: 17 (11-10-19 @ 05:42)  SpO2: --    TESTS & MEASUREMENTS:  Weight (Kg):     11-08-19 @ 07:01  -  11-09-19 @ 07:00  --------------------------------------------------------  IN: 0 mL / OUT: 4 mL / NET: -4 mL    11-09-19 @ 07:01  -  11-10-19 @ 07:00  --------------------------------------------------------  IN: 0 mL / OUT: 8 mL / NET: -8 mL                        12.0   7.77  )-----------( 120      ( 10 Nov 2019 06:12 )             37.7     11-10    145  |  108  |  25<H>  ----------------------------<  116<H>  3.9   |  26  |  0.9    Ca    7.5<L>      10 Nov 2019 06:12  Mg     1.6     11-10    TPro  4.4<L>  /  Alb  2.1<L>  /  TBili  0.3  /  DBili  x   /  AST  14  /  ALT  6   /  AlkPhos  49  11-10    LIVER FUNCTIONS - ( 10 Nov 2019 06:12 )  Alb: 2.1 g/dL / Pro: 4.4 g/dL / ALK PHOS: 49 U/L / ALT: 6 U/L / AST: 14 U/L / GGT: x           GI PCR Panel, Stool (collected 11-06-19 @ 15:00)  Source: .Stool Feces  Final Report (11-07-19 @ 03:16):    GI PCR Results: NOT detected    *******Please Note:*******    GI panel PCR evaluates for:    Campylobacter, Plesiomonas shigelloides, Salmonella,    Vibrio, Yersinia enterocolitica, Enteroaggregative    Escherichia coli (EAEC), Enteropathogenic E.coli (EPEC),    Enterotoxigenic E. coli (ETEC) lt/st, Shiga-like    toxin-producing E. coli (STEC) stx1/stx2,    Shigella/ Enteroinvasive E. coli (EIEC), Cryptosporidium,    Cyclospora cayetanensis, Entamoeba histolytica,    Giardia lamblia, Adenovirus F 40/41, Astrovirus,    Norovirus GI/GII, Rotavirus A, Sapovirus    Culture - Stool (collected 11-06-19 @ 15:00)  Source: .Stool Feces  Final Report (11-08-19 @ 16:40):    No enteric pathogens isolated.    (Stool culture examined for Salmonella,    Shigella, Campylobacter, Aeromonas, Plesiomonas,    Vibrio, E.coli O157 and Yersinia)    Culture - Blood (collected 11-04-19 @ 18:00)  Source: .Blood Blood-Peripheral  Final Report (11-10-19 @ 02:00):    No growth at 5 days.    Culture - Blood (collected 11-04-19 @ 18:00)  Source: .Blood Blood-Peripheral  Final Report (11-10-19 @ 02:00):    No growth at 5 days.    Culture - Urine (collected 11-04-19 @ 16:30)  Source: .Urine Clean Catch (Midstream)  Final Report (11-06-19 @ 00:19):    No growth    MEDICATIONS:  MEDICATIONS  (STANDING):  chlorhexidine 4% Liquid 1 Application(s) Topical daily  diltiazem    milliGRAM(s) Oral every 24 hours  lactated ringers. 1000 milliLiter(s) (75 mL/Hr) IV Continuous <Continuous>  rivaroxaban 10 milliGRAM(s) Oral with dinner  vancomycin    Solution 125 milliGRAM(s) Oral every 6 hours    MEDICATIONS  (PRN):    HOME MEDICATIONS:  Calcium 500+D oral tablet, chewable (08-26)  ferrous sulfate 325 mg (65 mg elemental iron) oral tablet (08-26)  hydroCHLOROthiazide 12.5 mg oral tablet (08-26)  Xarelto 10 mg oral tablet (08-26)      REVIEW OF SYSTEMS:  All other review of systems is negative unless indicated above.     PHYSICAL EXAM:  GENERAL: NAD  HEENT: No Swelling  CHEST/LUNG: Good air entry, No wheezing  HEART: RRR, No murmurs  ABDOMEN: Soft, Bowel sounds present  EXTREMITIES:  No clubbing

## 2019-11-11 NOTE — PROGRESS NOTE ADULT - SUBJECTIVE AND OBJECTIVE BOX
89yFemale with diagnosis: LARS ACUTE KIDNEY INJURY SEPSIS DIARRHEA      Palliative team spoke with the medical resident about clinical update. Patient' daughter was not reachable by the medical team and when palliative team spoke with daughter she was not receptive to the team. Palliative team will sign off at this time. Please reconsult as needed.         T(C): , Max: 36.8 (21:09)  T(F): 97.6  HR: 116 (113 - 119)  BP: 132/82 (105/64 - 134/72)  RR: 18 (18 - 18)  SpO2: --                                    12.6   9.34  )-----------( 147      ( 11 Nov 2019 07:06 )             40.3                                                                                      11-11    140  |  107  |  20  ----------------------------<  109<H>  4.8   |  21  |  0.9    Ca    7.7<L>      11 Nov 2019 07:06  Mg     1.9     11-11    TPro  4.7<L>  /  Alb  2.0<L>  /  TBili  0.3  /  DBili  x   /  AST  18  /  ALT  7   /  AlkPhos  51  11-11                                                      MEDICATIONS  (STANDING):  chlorhexidine 4% Liquid 1 Application(s) Topical daily  diltiazem    milliGRAM(s) Oral every 24 hours  lactated ringers. 1000 milliLiter(s) (75 mL/Hr) IV Continuous <Continuous>  rivaroxaban 10 milliGRAM(s) Oral with dinner  vancomycin    Solution 125 milliGRAM(s) Oral every 6 hours    MEDICATIONS  (PRN):

## 2019-11-11 NOTE — PROGRESS NOTE ADULT - SUBJECTIVE AND OBJECTIVE BOX
STEPH DAI  89y, Female  Allergy: penicillin (Rash)    Hospital Day: 7d    Patient seen and examined earlier today. Had two episodes of watery diarrhea today morning. Denies abdominal pain.    PMH/PSH:  PAST MEDICAL & SURGICAL HISTORY:  Dyslipidemia  Hypertension  Atrial fibrillation  H/O abdominal hysterectomy  History of cholecystectomy    VITALS:  T(F): 97.6 (11-11-19 @ 12:30), Max: 98.3 (11-10-19 @ 21:09)  HR: 116 (11-11-19 @ 12:30)  BP: 132/82 (11-11-19 @ 12:30) (105/64 - 134/72)  RR: 18 (11-11-19 @ 12:30)  SpO2: --    TESTS & MEASUREMENTS:  Weight (Kg):     11-09-19 @ 07:01  -  11-10-19 @ 07:00  --------------------------------------------------------  IN: 0 mL / OUT: 8 mL / NET: -8 mL    11-10-19 @ 07:01  -  11-11-19 @ 07:00  --------------------------------------------------------  IN: 675 mL / OUT: 8 mL / NET: 667 mL    11-11-19 @ 07:01  -  11-11-19 @ 13:36  --------------------------------------------------------  IN: 540 mL / OUT: 0 mL / NET: 540 mL                        12.6   9.34  )-----------( 147      ( 11 Nov 2019 07:06 )             40.3     11-11    140  |  107  |  20  ----------------------------<  109<H>  4.8   |  21  |  0.9    Ca    7.7<L>      11 Nov 2019 07:06  Mg     1.9     11-11    TPro  4.7<L>  /  Alb  2.0<L>  /  TBili  0.3  /  DBili  x   /  AST  18  /  ALT  7   /  AlkPhos  51  11-11    LIVER FUNCTIONS - ( 11 Nov 2019 07:06 )  Alb: 2.0 g/dL / Pro: 4.7 g/dL / ALK PHOS: 51 U/L / ALT: 7 U/L / AST: 18 U/L / GGT: x           GI PCR Panel, Stool (collected 11-06-19 @ 15:00)  Source: .Stool Feces  Final Report (11-07-19 @ 03:16):    GI PCR Results: NOT detected    *******Please Note:*******    GI panel PCR evaluates for:    Campylobacter, Plesiomonas shigelloides, Salmonella,    Vibrio, Yersinia enterocolitica, Enteroaggregative    Escherichia coli (EAEC), Enteropathogenic E.coli (EPEC),    Enterotoxigenic E. coli (ETEC) lt/st, Shiga-like    toxin-producing E. coli (STEC) stx1/stx2,    Shigella/ Enteroinvasive E. coli (EIEC), Cryptosporidium,    Cyclospora cayetanensis, Entamoeba histolytica,    Giardia lamblia, Adenovirus F 40/41, Astrovirus,    Norovirus GI/GII, Rotavirus A, Sapovirus    Culture - Stool (collected 11-06-19 @ 15:00)  Source: .Stool Feces  Final Report (11-08-19 @ 16:40):    No enteric pathogens isolated.    (Stool culture examined for Salmonella,    Shigella, Campylobacter, Aeromonas, Plesiomonas,    Vibrio, E.coli O157 and Yersinia)    Culture - Blood (collected 11-04-19 @ 18:00)  Source: .Blood Blood-Peripheral  Final Report (11-10-19 @ 02:00):    No growth at 5 days.    Culture - Blood (collected 11-04-19 @ 18:00)  Source: .Blood Blood-Peripheral  Final Report (11-10-19 @ 02:00):    No growth at 5 days.    Culture - Urine (collected 11-04-19 @ 16:30)  Source: .Urine Clean Catch (Midstream)  Final Report (11-06-19 @ 00:19):    No growth    MEDICATIONS:  MEDICATIONS  (STANDING):  chlorhexidine 4% Liquid 1 Application(s) Topical daily  diltiazem    milliGRAM(s) Oral every 24 hours  lactated ringers. 1000 milliLiter(s) (75 mL/Hr) IV Continuous <Continuous>  rivaroxaban 10 milliGRAM(s) Oral with dinner  vancomycin    Solution 125 milliGRAM(s) Oral every 6 hours    MEDICATIONS  (PRN):    HOME MEDICATIONS:  Calcium 500+D oral tablet, chewable (08-26)  ferrous sulfate 325 mg (65 mg elemental iron) oral tablet (08-26)  hydroCHLOROthiazide 12.5 mg oral tablet (08-26)  Xarelto 10 mg oral tablet (08-26)      REVIEW OF SYSTEMS:  All other review of systems is negative unless indicated above.     PHYSICAL EXAM:  GENERAL: NAD  HEENT: No Swelling  CHEST/LUNG: Good air entry, No wheezing  HEART: RRR, No murmurs  ABDOMEN: Soft, Bowel sounds present  EXTREMITIES:  No clubbing

## 2019-11-11 NOTE — CHART NOTE - NSCHARTNOTEFT_GEN_A_CORE
Registered Dietitian Follow-Up     Patient Profile Reviewed                           Yes [x]   No []     Nutrition History Previously Obtained        Yes []  No [x] no family at b/s. unable to reach emergency contact. Weight hx: pt weighed 49.9 kg on (9/11/19) on previous adm. Weight stable to PTA.        Pertinent Subjective Information: Pt is pleasantly confused. Not able to answer my questions. Spoke to PCA - pt ate 100% of meal tray this AM, PCA was going to start feeding her but pt was already eating independently.      Pertinent Medical Interventions: Severe Sepsis present on admission 2/2 C. Diff colitis. Paroxysmal Afib/HTN. LARS, pre-renal in setting of septic shock and diarrhea. Metabolic Encephalopathy present on admission d/t sepsis, now back to baseline.      Diet order: Dysphagia 1 puree, thin liquids      Anthropometrics:  - Ht. no height in EMR, observed pt is ~60in   - Wt. 50kg (11/4)   - %wt change  - BMI   - IBW      Pertinent Lab Data: (11/10) RBC 4.08  (11/11) glucose 109  GFR 54       Pertinent Meds: xarelto, vanco, lactated ringers @75ml/hr     Physical Findings:  - Appearance: pleasant, confused   - GI function: LBM 11/11 loose, yellow   - Tubes:  - Oral/Mouth cavity: No signs of aspiration per PCA  - Skin: stage 2 b/l buttocks     Nutrition Requirements  Weight Used: 50kg, needs cont from RD assessment 11/7     Energy: 5917-9976 kcal/day (30-35 kcal/kg ABW)    Protein: 63-75 g/day (1.25-1.5 g/kg ABW)    Fluids: 1 mL/kcal     Nutrient Intake: likely meeting needs         [] Previous Nutrition Diagnosis:  Increased Nutrient Needs            [x] Ongoing, but po intake improved           [] Resolved    [] No active nutrition diagnosis identified at this time        Nutrition Intervention meal/snack, vitamin/minerals     Goal/Expected Outcome: Pt to consume >75% of meal tray in 7 days      Indicator/Monitoring: RD to monitor energy intake, diet order, body comp, NFPF.     Recommendation: Continue current diet order. Consider probiotics and MVI w/ minerals daily. Consult SLP for eval if signs of aspiration/choking Registered Dietitian Follow-Up     Patient Profile Reviewed                           Yes [x]   No []     Nutrition History Previously Obtained        Yes []  No [x] no family at b/s. unable to reach emergency contact. Weight hx: pt weighed 49.9 kg on (9/11/19) on previous adm. Weight stable to PTA.        Pertinent Subjective Information: Pt is pleasantly confused. Not able to answer my questions. Spoke to PCA - pt ate 100% of meal tray this AM, PCA was going to start feeding her but pt was already eating independently.      Pertinent Medical Interventions: Severe Sepsis present on admission 2/2 C. Diff colitis. Paroxysmal Afib/HTN. LARS, pre-renal in setting of septic shock and diarrhea. Metabolic Encephalopathy present on admission d/t sepsis, now back to baseline.      Diet order: Dysphagia 1 puree, thin liquids      Anthropometrics:  - Ht. no height in EMR, observed pt is ~60in   - Wt. 50kg (11/4)   - %wt change  - BMI   - IBW      Pertinent Lab Data: (11/10) RBC 4.08  (11/11) glucose 109  GFR 54       Pertinent Meds: xarelto, vanco, lactated ringers @75ml/hr     Physical Findings:  - Appearance: pleasant, confused   - GI function: LBM 11/11 loose, yellow   - Tubes:  - Oral/Mouth cavity: No signs of aspiration per PCA. ?pt edentulous. Pt would not open her mouth wide enough to see  - Skin: stage 2 b/l buttocks     Nutrition Requirements  Weight Used: 50kg, needs cont from RD assessment 11/7     Energy: 2451-6649 kcal/day (30-35 kcal/kg ABW)    Protein: 63-75 g/day (1.25-1.5 g/kg ABW)    Fluids: 1 mL/kcal     Nutrient Intake: likely meeting needs         [] Previous Nutrition Diagnosis:  Increased Nutrient Needs            [x] Ongoing, but po intake improved           [] Resolved    [] No active nutrition diagnosis identified at this time        Nutrition Intervention meal/snack, vitamin/minerals     Goal/Expected Outcome: Pt to consume >75% of meal tray in 7 days      Indicator/Monitoring: RD to monitor energy intake, diet order, body comp, NFPF.     Recommendation: Continue current diet order. Consider probiotics and MVI w/ minerals daily. Consult SLP for eval if signs of aspiration/choking

## 2019-11-11 NOTE — PROGRESS NOTE ADULT - SUBJECTIVE AND OBJECTIVE BOX
SUBJECTIVE:    Patient is a 89y old  Female who presents with a chief complaint of Altered mental status (10 Nov 2019 11:03)    Currently admitted to medicine with the primary diagnosis of Colitis     Today is hospital day 7d.     Patient was seen at bedside this AM.  She had a BM movement that was watery and had two previous watery BM at 8 and 9AM today.  She is non-toxic in appearance and complains of no abdominal pain, fevers, chills. Resting comfortably in bed today.    PAST MEDICAL & SURGICAL HISTORY  PAST MEDICAL & SURGICAL HISTORY:  Dyslipidemia  Hypertension  Atrial fibrillation  H/O abdominal hysterectomy  History of cholecystectomy      ALLERGIES:  penicillin (Rash)    MEDICATIONS:  STANDING MEDICATIONS  chlorhexidine 4% Liquid 1 Application(s) Topical daily  diltiazem    milliGRAM(s) Oral every 24 hours  lactated ringers. 1000 milliLiter(s) IV Continuous <Continuous>  rivaroxaban 10 milliGRAM(s) Oral with dinner  vancomycin    Solution 125 milliGRAM(s) Oral every 6 hours    PRN MEDICATIONS    VITALS:   T(F): 97.6  HR: 116  BP: 132/82  RR: 18  SpO2: --    LABS:                        12.6   9.34  )-----------( 147      ( 11 Nov 2019 07:06 )             40.3     11-11    140  |  107  |  20  ----------------------------<  109<H>  4.8   |  21  |  0.9    Ca    7.7<L>      11 Nov 2019 07:06  Mg     1.9     11-11    TPro  4.7<L>  /  Alb  2.0<L>  /  TBili  0.3  /  DBili  x   /  AST  18  /  ALT  7   /  AlkPhos  51  11-11                  RADIOLOGY  < from: CT Abdomen and Pelvis No Cont (11.04.19 @ 20:49) >  IMPRESSION:       Diffuse circumferential thickening of the colon with mild pericolonic   inflammation, compatible with pancolitis. No intraperitoneal free air or   drainable collection.    < end of copied text >      PHYSICAL EXAM:  GEN: No acute distress.  Non-toxic and HD stable.   HEENT: NCAT  LUNGS: Clear to auscultation bilaterally   HEART: RRR. no murmurs  ABD: Soft, non-tender, non-distended.  No pain upon palpation on all four quadrants. +BS on all four quadrants.  No rebound tenderness, guarding on palpation on all four quadrants.   EXT: no edema  NEURO: AAOX2.  Alert and oriented to self and place but not time.     Assessment and Plan  89 yo F from senior resident w/ PMH of afib, HTN and DLD was brought in to hospital for altered mental status. The aid reported decreased appetite and frequent, watery bowel movements.     # AMS secondary to metabolic encephalopathy from septic shock  # Septic shock secondary to C. Diff colitis-->Patient is hemodynamically stable while recieving IVF.  Lactate slightly uptrending to 1.9 today but is getting LR  - Patient is stable having loose BM as of this AM. ID consult placed to see if there is any adjustments to be made to her ABx regimen.   WBC downtrending to 9 today.   - CT noted, pan colitis, lactic acid trending down, leukocytosis  - c/w w/ pureed diet   - Currently on vanc PO 125mg PO [id number]-->b/c of c.diff colitis complicated by septic shock-->Increase dose of vanc to 500mg Po q6 and add IV flagyl??? Waiting for ID recommendations.   - GI PCR negative, C diff positive, stool cx negative     # LARS likely prerenal secondary to septic shock, improved   # Sodium stable at 140 today.   - baseline Cr 0.7, admission Cr 7.7  - Creatinine at .9 today.   - c/w LR at 75 and PO intake   - accurate intake/output     # Mild elevation in troponin likely type II demand ischemia, stable   - asymptomatic   - EKG shows afib  - echo unremarkable    # Atrial fibrillation  Goal to have HR<110.  Patient persistently tachy-->May have to increase diltiazem dose.    - resume cardizem at 30mg q6h with parameter   - resume AC     # HTN, resolved  - hold all anti-hypertensive medications    Diet: pureed diet   GI ppx: N/A  DVT ppx: Xarelto  Activity: Increase as tolerated, pending PT/Rehab eval  Code status: Full Code (  ) / DNR ( X ) / DNI ( X )  Disposition: from home, discharge planning

## 2019-11-11 NOTE — PROGRESS NOTE ADULT - ASSESSMENT
89 year old lady DNI/DNR (per previous records), with history of paroxysmal afib, hypertension and DLD was brought in for altered mental status. Initially admitted to ICU for severe sepsis 2/2 colitis. Downgraded after clinical improvement    1. Severe Sepsis present on admission 2/2 C. Diff colitis: Pancolitis seen on CT abdomen/Pelvis. Cont vancomycin PO. Still having diarrhea. Will have ID follow-up.  2. Paroxysmal Afib: On xarelto and cardizem  3. HTN: Cont cardizem  4. LARS, pre-renal in setting of septic shock and diarrhea: Creatinine improving on IVF. Continue to monitor  5. Stage 2 bilateral decubiti buttocks ulcers, present on admission: Cont wound care  6. Metabolic Encephalopathy present on admission d/t sepsis, now back to baseline: Will continue to monitor    GI/DVT PPX    #Progress Note Handoff  Pending (specify): DC planning  Family discussion: Pending, daughter unable to be reaches  Disposition: TBD 89 year old lady DNI/DNR (per previous records), with history of paroxysmal afib, hypertension and DLD was brought in for altered mental status. Initially admitted to ICU for severe sepsis 2/2 colitis. Downgraded after clinical improvement    1. Severe Sepsis present on admission 2/2 C. Diff colitis: Pancolitis seen on CT abdomen/Pelvis. Cont vancomycin PO. f/u ID consult  2. Persistent Afib: On xarelto and cardizem. May uptitrate cardizem ER to 180 starting tomorrow  3. HTN: Cont cardizem  4. LARS, pre-renal in setting of septic shock and diarrhea: Creatinine improving on IVF. Continue to monitor  5. Stage 2 bilateral decubiti buttocks ulcers, present on admission: Cont wound care  6. Metabolic Encephalopathy present on admission d/t sepsis, now back to baseline: Will continue to monitor    GI/DVT PPX    #Progress Note Handoff  Pending (specify): DC planning  Family discussion: Pending, daughter unable to be reached  Disposition: TBD

## 2019-11-12 NOTE — CONSULT NOTE ADULT - SUBJECTIVE AND OBJECTIVE BOX
Infectious Disease Consult:    Assessment and Recommendations    89 year old lady DNI/DNR (per previous records), with history of Afib, hypertension and DLD was brought in for altered mental status as per ED note. The aid reported decreased appetite and frequent, watery bowel movements. The patient denies abdominal pain, fever, dyspnea, headache. The aid relates that she called the emergency contact, the daughter in law reported that there is no relationship with the patient and that her daughter and granddaughter usually visit her. The pt lives in a senior home and has an aid that visits her regularly. The patient was recently admitted, sep 2019, for severe sepsis 2/2 colitis. She was sepsis upon admission with a WBC of 43.79 but has since trended down with a WBC of 9.34 as of yesterday. She was also hypotensive upon admission with a BP of 74/41 and lactic acid of 3.1. Pt. denies any recent n/f/v/c/sob.        Past Surgical Histroy:  H/O abdominal hysterectomy   History of cholecystectomy    Family History:  No pertinent family history in first degree relatives.     Social History:  Denies alcohol  and illicit drug use  Former smoker; quit in 1970s; 30 pack year history          REVIEW OF SYSTEMS  GENERAL: no respiratory distress  	HEAD:  Atraumatic, Normocephalic  	EYES: EOMI, non-icteric, no injected sclera  	NECK: Supple, No JVD  	CHEST/LUNG: Clear to auscultation bilaterally; No wheeze; No crackles;    	HEART: Regular rate and rhythm; No murmurs;   	ABDOMEN: Soft, Nontender, Nondistended; Bowel sounds present;    	EXTREMITIES:  no edema  	PSYCH: AAOx2 (not oriented to time)  NEUROLOGY: non focal motor deficit    Recent Ill Contacts:	[x] No	[] Yes:  Recent Travel History:	[x] No	[] Yes:  Recent Animal/Insect Exposure/Tick Bites:	[x] No	[] Yes:    Allergies  penicillin (Rash)    Intolerances      Antimicrobials:  vancomycin    Solution 125 milliGRAM(s) Oral every 6 hours      Other Medications:  chlorhexidine 4% Liquid 1 Application(s) Topical daily  diltiazem    milliGRAM(s) Oral every 24 hours  lactated ringers. 1000 milliLiter(s) IV Continuous <Continuous>  rivaroxaban 10 milliGRAM(s) Oral with dinner      IMMUNIZATIONS  [] Up to Date		[] Not Up to Date:  Recent Immunizations:	[] No	[] Yes:    Daily Height in cm: 154.94 (12 Nov 2019 07:36)    Daily   Head Circumference:  Vital Signs Last 24 Hrs  T(C): 35.7 (12 Nov 2019 12:25), Max: 36.3 (11 Nov 2019 22:27)  T(F): 96.3 (12 Nov 2019 12:25), Max: 97.4 (11 Nov 2019 22:27)  HR: 95 (12 Nov 2019 12:25) (95 - 107)  BP: 104/65 (12 Nov 2019 12:25) (92/63 - 104/73)  BP(mean): --  RR: 17 (12 Nov 2019 12:25) (17 - 18)  SpO2: --        Respiratory Support:		[] No	[] Yes:  Vasoactive medication infusion:	[] No	[] Yes:  Venous catheters:		[] No	[] Yes:  Bladder catheter:		[] No	[] Yes:  Other catheters or tubes:	[] No	[] Yes:    Lab Results:                        12.5   7.32  )-----------( 159      ( 12 Nov 2019 07:34 )             39.1     11-12    141  |  105  |  17  ----------------------------<  92  4.3   |  27  |  0.8    Ca    7.5<L>      12 Nov 2019 07:34  Mg     1.6     11-12    TPro  4.7<L>  /  Alb  2.1<L>  /  TBili  0.3  /  DBili  x   /  AST  15  /  ALT  6   /  AlkPhos  50  11-12    LIVER FUNCTIONS - ( 12 Nov 2019 07:34 )  Alb: 2.1 g/dL / Pro: 4.7 g/dL / ALK PHOS: 50 U/L / ALT: 6 U/L / AST: 15 U/L / GGT: x                 MICROBIOLOGY    [] Pathology slides reviewed and/or discussed with pathologist  [] Microbiology findings discussed with microbiologist or slides reviewed  [] Images reviewed with radiologist  [] Case discussed with an attending physician in addition to the patient's primary physician  [] [place] [facility] [place]    [] Patient requires continued monitoring for:  [] Total critical care time spent by attending physician: __ minutes, excluding procedure time.    IMPRESSION:  #Sepsis on admission w/ WBC of 43.79 and Lactic acid of 3.1  #CT Abdomen: Diffuse circumferential thickening of the colon with mild pericolonic   inflammation, compatible with pancolitis. No intraperitoneal free air or   drainable collection.  #Stool feces- Negative  C-diff by PCR- Positive    Recommendation: Taper Vancomycin: Cont. w/   Another 2 weeks of PO 125mg of Vanc q6h  6th week PO Vanc 125mg q12h. Infectious Disease Consult:    Assessment and Recommendations    89 year old lady DNI/DNR (per previous records), with history of Afib, hypertension and DLD was brought in for altered mental status as per ED note. The aid reported decreased appetite and frequent, watery bowel movements. The patient denies abdominal pain, fever, dyspnea, headache. The aid relates that she called the emergency contact, the daughter in law reported that there is no relationship with the patient and that her daughter and granddaughter usually visit her. The pt lives in a senior home and has an aid that visits her regularly. The patient was recently admitted, sep 2019, for severe sepsis 2/2 colitis. She was sepsis upon admission with a WBC of 43.79 but has since trended down with a WBC of 9.34 as of yesterday. She was also hypotensive upon admission with a BP of 74/41 and lactic acid of 3.1. Pt. denies any recent n/f/v/c/sob.        Past Surgical Histroy:  H/O abdominal hysterectomy   History of cholecystectomy    Family History:  No pertinent family history in first degree relatives.     Social History:  Denies alcohol  and illicit drug use  Former smoker; quit in 1970s; 30 pack year history          REVIEW OF SYSTEMS  GENERAL: no respiratory distress  	HEAD:  Atraumatic, Normocephalic  	EYES: EOMI, non-icteric, no injected sclera  	NECK: Supple, No JVD  	CHEST/LUNG: Clear to auscultation bilaterally; No wheeze; No crackles;    	HEART: Regular rate and rhythm; No murmurs;   	ABDOMEN: Soft, Nontender, Nondistended; Bowel sounds present;    	EXTREMITIES:  no edema  	PSYCH: AAOx2 (not oriented to time)  NEUROLOGY: non focal motor deficit    Recent Ill Contacts:	[x] No	[] Yes:  Recent Travel History:	[x] No	[] Yes:  Recent Animal/Insect Exposure/Tick Bites:	[x] No	[] Yes:    Allergies  penicillin (Rash)    Intolerances      Antimicrobials:  vancomycin    Solution 125 milliGRAM(s) Oral every 6 hours      Other Medications:  chlorhexidine 4% Liquid 1 Application(s) Topical daily  diltiazem    milliGRAM(s) Oral every 24 hours  lactated ringers. 1000 milliLiter(s) IV Continuous <Continuous>  rivaroxaban 10 milliGRAM(s) Oral with dinner      IMMUNIZATIONS  [] Up to Date		[] Not Up to Date:  Recent Immunizations:	[] No	[] Yes:    Daily Height in cm: 154.94 (12 Nov 2019 07:36)    Daily   Head Circumference:  Vital Signs Last 24 Hrs  T(C): 35.7 (12 Nov 2019 12:25), Max: 36.3 (11 Nov 2019 22:27)  T(F): 96.3 (12 Nov 2019 12:25), Max: 97.4 (11 Nov 2019 22:27)  HR: 95 (12 Nov 2019 12:25) (95 - 107)  BP: 104/65 (12 Nov 2019 12:25) (92/63 - 104/73)  BP(mean): --  RR: 17 (12 Nov 2019 12:25) (17 - 18)  SpO2: --        Respiratory Support:		[] No	[] Yes:  Vasoactive medication infusion:	[] No	[] Yes:  Venous catheters:		[] No	[] Yes:  Bladder catheter:		[] No	[] Yes:  Other catheters or tubes:	[] No	[] Yes:    Lab Results:                        12.5   7.32  )-----------( 159      ( 12 Nov 2019 07:34 )             39.1     11-12    141  |  105  |  17  ----------------------------<  92  4.3   |  27  |  0.8    Ca    7.5<L>      12 Nov 2019 07:34  Mg     1.6     11-12    TPro  4.7<L>  /  Alb  2.1<L>  /  TBili  0.3  /  DBili  x   /  AST  15  /  ALT  6   /  AlkPhos  50  11-12    LIVER FUNCTIONS - ( 12 Nov 2019 07:34 )  Alb: 2.1 g/dL / Pro: 4.7 g/dL / ALK PHOS: 50 U/L / ALT: 6 U/L / AST: 15 U/L / GGT: x                 MICROBIOLOGY    [] Pathology slides reviewed and/or discussed with pathologist  [] Microbiology findings discussed with microbiologist or slides reviewed  [] Images reviewed with radiologist  [] Case discussed with an attending physician in addition to the patient's primary physician  [] [place] [facility] [place]    [] Patient requires continued monitoring for:  [] Total critical care time spent by attending physician: __ minutes, excluding procedure time.    IMPRESSION:  #Sepsis on admission w/ WBC of 43.79 and Lactic acid of 3.1  #Sepsis secondary to colitis  #CT Abdomen: Diffuse circumferential thickening of the colon with mild pericolonic   inflammation, compatible with pancolitis. No intraperitoneal free air or   drainable collection.  #Stool feces- Negative  C-diff by PCR- Positive    Recommendation: Taper Vancomycin: Cont. w/   Another 2 weeks of PO 125mg of Vanc q6h  6th week PO Vanc 125mg q12h. Infectious Disease Consult:    Assessment and Recommendations    89 year old lady DNI/DNR (per previous records), with history of Afib, hypertension and DLD was brought in for altered mental status as per ED note. The aid reported decreased appetite and frequent, watery bowel movements. The patient denies abdominal pain, fever, dyspnea, headache. The aid relates that she called the emergency contact, the daughter in law reported that there is no relationship with the patient and that her daughter and granddaughter usually visit her. The pt lives in a senior home and has an aid that visits her regularly. The patient was recently admitted, sep 2019, for severe sepsis 2/2 colitis. She was sepsis upon admission with a WBC of 43.79 but has since trended down with a WBC of 9.34 as of yesterday. She was also hypotensive upon admission with a BP of 74/41 and lactic acid of 3.1. Pt. denies any recent n/f/v/c/sob.        Past Surgical Histroy:  H/O abdominal hysterectomy   History of cholecystectomy    Family History:  No pertinent family history in first degree relatives.     Social History:  Denies alcohol  and illicit drug use  Former smoker; quit in 1970s; 30 pack year history          REVIEW OF SYSTEMS  GENERAL: no respiratory distress  	HEAD:  Atraumatic, Normocephalic  	EYES: EOMI, non-icteric, no injected sclera  	NECK: Supple, No JVD  	CHEST/LUNG: Clear to auscultation bilaterally; No wheeze; No crackles;    	HEART: Regular rate and rhythm; No murmurs;   	ABDOMEN: Soft, Nontender, Nondistended; Bowel sounds present;    	EXTREMITIES:  no edema  	PSYCH: AAOx2 (not oriented to time)  NEUROLOGY: non focal motor deficit    Recent Ill Contacts:	[x] No	[] Yes:  Recent Travel History:	[x] No	[] Yes:  Recent Animal/Insect Exposure/Tick Bites:	[x] No	[] Yes:    Allergies  penicillin (Rash)    Intolerances      Antimicrobials:  vancomycin    Solution 125 milliGRAM(s) Oral every 6 hours      Other Medications:  chlorhexidine 4% Liquid 1 Application(s) Topical daily  diltiazem    milliGRAM(s) Oral every 24 hours  lactated ringers. 1000 milliLiter(s) IV Continuous <Continuous>  rivaroxaban 10 milliGRAM(s) Oral with dinner      IMMUNIZATIONS  [] Up to Date		[] Not Up to Date:  Recent Immunizations:	[] No	[] Yes:    Daily Height in cm: 154.94 (12 Nov 2019 07:36)    Daily   Head Circumference:  Vital Signs Last 24 Hrs  T(C): 35.7 (12 Nov 2019 12:25), Max: 36.3 (11 Nov 2019 22:27)  T(F): 96.3 (12 Nov 2019 12:25), Max: 97.4 (11 Nov 2019 22:27)  HR: 95 (12 Nov 2019 12:25) (95 - 107)  BP: 104/65 (12 Nov 2019 12:25) (92/63 - 104/73)  BP(mean): --  RR: 17 (12 Nov 2019 12:25) (17 - 18)  SpO2: --        Respiratory Support:		[] No	[] Yes:  Vasoactive medication infusion:	[] No	[] Yes:  Venous catheters:		[] No	[] Yes:  Bladder catheter:		[] No	[] Yes:  Other catheters or tubes:	[] No	[] Yes:    Lab Results:                        12.5   7.32  )-----------( 159      ( 12 Nov 2019 07:34 )             39.1     11-12    141  |  105  |  17  ----------------------------<  92  4.3   |  27  |  0.8    Ca    7.5<L>      12 Nov 2019 07:34  Mg     1.6     11-12    TPro  4.7<L>  /  Alb  2.1<L>  /  TBili  0.3  /  DBili  x   /  AST  15  /  ALT  6   /  AlkPhos  50  11-12    LIVER FUNCTIONS - ( 12 Nov 2019 07:34 )  Alb: 2.1 g/dL / Pro: 4.7 g/dL / ALK PHOS: 50 U/L / ALT: 6 U/L / AST: 15 U/L / GGT: x                 MICROBIOLOGY    [] Pathology slides reviewed and/or discussed with pathologist  [] Microbiology findings discussed with microbiologist or slides reviewed  [] Images reviewed with radiologist  [] Case discussed with an attending physician in addition to the patient's primary physician  [] Records, [place] from [place] [facility] [place]    [] Patient requires continued monitoring for:  [] Total critical care time spent by attending physician: __ minutes, excluding procedure time.    IMPRESSION:  #Sepsis on admission w/ WBC of 43.79 and Lactic acid of 3.1  #Sepsis secondary to colitis  #CT Abdomen: Diffuse circumferential thickening of the colon with mild pericolonic   inflammation, compatible with pancolitis. No intraperitoneal free air or   drainable collection.  #Stool feces- Negative  C-diff by PCR- Positive    Recommendation:  -Taper Vancomycin: Cont. w/ another 22 days of  Vanc PO 125mg q6h.  -December 5th begin Vanc PO 125mg q12h for two weeks. Infectious Disease Consult:    Assessment and Recommendations    89 year old lady DNI/DNR (per previous records), with history of Afib, hypertension and DLD was brought in for altered mental status as per ED note. The aid reported decreased appetite and frequent, watery bowel movements. The patient denies abdominal pain, fever, dyspnea, headache. The aid relates that she called the emergency contact, the daughter in law reported that there is no relationship with the patient and that her daughter and granddaughter usually visit her. The pt lives in a senior home and has an aid that visits her regularly. The patient was recently admitted, sep 2019, for severe sepsis 2/2 colitis. She was sepsis upon admission with a WBC of 43.79 but has since trended down with a WBC of 9.34 as of yesterday. She was also hypotensive upon admission with a BP of 74/41 and lactic acid of 3.1. Pt. denies any recent n/f/v/c/sob.        Past Surgical Histroy:  H/O abdominal hysterectomy   History of cholecystectomy    Family History:  No pertinent family history in first degree relatives.     Social History:  Denies alcohol  and illicit drug use  Former smoker; quit in 1970s; 30 pack year history          REVIEW OF SYSTEMS  GENERAL: no respiratory distress  	HEAD:  Atraumatic, Normocephalic  	EYES: EOMI, non-icteric, no injected sclera  	NECK: Supple, No JVD  	CHEST/LUNG: Clear to auscultation bilaterally; No wheeze; No crackles;    	HEART: Regular rate and rhythm; No murmurs;   	ABDOMEN: Soft, Nontender, Nondistended; Bowel sounds present;    	EXTREMITIES:  no edema  	PSYCH: AAOx2 (not oriented to time)  NEUROLOGY: non focal motor deficit    Recent Ill Contacts:	[x] No	[] Yes:  Recent Travel History:	[x] No	[] Yes:  Recent Animal/Insect Exposure/Tick Bites:	[x] No	[] Yes:    Allergies  penicillin (Rash)    Intolerances      Antimicrobials:  vancomycin    Solution 125 milliGRAM(s) Oral every 6 hours      Other Medications:  chlorhexidine 4% Liquid 1 Application(s) Topical daily  diltiazem    milliGRAM(s) Oral every 24 hours  lactated ringers. 1000 milliLiter(s) IV Continuous <Continuous>  rivaroxaban 10 milliGRAM(s) Oral with dinner      IMMUNIZATIONS  [] Up to Date		[] Not Up to Date:  Recent Immunizations:	[] No	[] Yes:    Daily Height in cm: 154.94 (12 Nov 2019 07:36)    Daily   Head Circumference:  Vital Signs Last 24 Hrs  T(C): 35.7 (12 Nov 2019 12:25), Max: 36.3 (11 Nov 2019 22:27)  T(F): 96.3 (12 Nov 2019 12:25), Max: 97.4 (11 Nov 2019 22:27)  HR: 95 (12 Nov 2019 12:25) (95 - 107)  BP: 104/65 (12 Nov 2019 12:25) (92/63 - 104/73)  BP(mean): --  RR: 17 (12 Nov 2019 12:25) (17 - 18)  SpO2: --        Respiratory Support:		[] No	[] Yes:  Vasoactive medication infusion:	[] No	[] Yes:  Venous catheters:		[] No	[] Yes:  Bladder catheter:		[] No	[] Yes:  Other catheters or tubes:	[] No	[] Yes:    Lab Results:                        12.5   7.32  )-----------( 159      ( 12 Nov 2019 07:34 )             39.1     11-12    141  |  105  |  17  ----------------------------<  92  4.3   |  27  |  0.8    Ca    7.5<L>      12 Nov 2019 07:34  Mg     1.6     11-12    TPro  4.7<L>  /  Alb  2.1<L>  /  TBili  0.3  /  DBili  x   /  AST  15  /  ALT  6   /  AlkPhos  50  11-12    LIVER FUNCTIONS - ( 12 Nov 2019 07:34 )  Alb: 2.1 g/dL / Pro: 4.7 g/dL / ALK PHOS: 50 U/L / ALT: 6 U/L / AST: 15 U/L / GGT: x                 MICROBIOLOGY    [] Pathology slides reviewed and/or discussed with pathologist  [] Microbiology findings discussed with microbiologist or slides reviewed  [] Images reviewed with radiologist  [] Case discussed with an attending physician in addition to the patient's primary physician  [] [place] [facility] [place]    [] Patient requires continued monitoring for:  [] Total critical care time spent by attending physician: __ minutes, excluding procedure time.    IMPRESSION:  #Sepsis ( WBC 43.79 , Lactic acid of 3.1) secondary to CD colitis  #CT Abdomen: Diffuse circumferential thickening of the colon with mild pericolonic   inflammation, compatible with pancolitis. No intraperitoneal free air or   drainable collection.  C-diff by PCR- Positive  Clinically with a significant improvement  No ongoing peritonitis  WBC 43.7>9.3    RECOMMENDATIONS:  po vancomycin 125 mg q6h for 2 weeks then q12h for 2 more weeks, then 125 mg q24h for 2 more weeks

## 2019-11-12 NOTE — PROGRESS NOTE ADULT - ASSESSMENT
89 year old lady DNI/DNR (per previous records), with history of paroxysmal afib, hypertension and DLD was brought in for altered mental status. Initially admitted to ICU for severe sepsis 2/2 colitis. Downgraded after clinical improvement    1. Severe Sepsis present on admission 2/2 C. Diff colitis: Pancolitis seen on CT abdomen/Pelvis. Cont vancomycin PO. f/u ID consult  2. Persistent Afib: On xarelto and cardizem.   3 HTN: Cont cardizem  4. LARS, pre-renal in setting of septic shock and diarrhea: Creatinine improving on IVF. Continue to monitor  5. Stage 2 bilateral decubiti buttocks ulcers, present on admission: Cont wound care  6. Metabolic Encephalopathy present on admission d/t sepsis, now back to baseline: Will continue to monitor    GI/DVT PPX    #Progress Note Handoff  Pending (specify): DC planning. still liquid bowel movement   Family discussion: Daughter not agreeable to palliative care.   Disposition: Unknown

## 2019-11-12 NOTE — PROGRESS NOTE ADULT - SUBJECTIVE AND OBJECTIVE BOX
STEPH DAI  89y, Female  Allergy: penicillin (Rash)    Hospital Day: 8th  Np specific complaints     PMH/PSH:  PAST MEDICAL & SURGICAL HISTORY:  Dyslipidemia  Hypertension  Atrial fibrillation  H/O abdominal hysterectomy  History of cholecystectomy    Vital Signs Last 24 Hrs  T(C): 35.7 (12 Nov 2019 12:25), Max: 36.3 (11 Nov 2019 22:27)  T(F): 96.3 (12 Nov 2019 12:25), Max: 97.4 (11 Nov 2019 22:27)  HR: 95 (12 Nov 2019 12:25) (95 - 107)  BP: 104/65 (12 Nov 2019 12:25) (92/63 - 104/73)  BP(mean): --  RR: 17 (12 Nov 2019 12:25) (17 - 18)        MEDICATIONS:  MEDICATIONS  (STANDING):  chlorhexidine 4% Liquid 1 Application(s) Topical daily  diltiazem    milliGRAM(s) Oral every 24 hours  lactated ringers. 1000 milliLiter(s) (75 mL/Hr) IV Continuous <Continuous>  rivaroxaban 10 milliGRAM(s) Oral with dinner  vancomycin    Solution 125 milliGRAM(s) Oral every 6 hours    MEDICATIONS  (PRN):    HOME MEDICATIONS:  Calcium 500+D oral tablet, chewable (08-26)  ferrous sulfate 325 mg (65 mg elemental iron) oral tablet (08-26)  hydroCHLOROthiazide 12.5 mg oral tablet (08-26)  Xarelto 10 mg oral tablet (08-26)      REVIEW OF SYSTEMS:  All other review of systems is negative unless indicated above.     PHYSICAL EXAM:  GENERAL: NAD  HEENT: No Swelling  CHEST/LUNG: Good air entry, No wheezing  HEART: RRR, No murmurs  ABDOMEN: Soft, Bowel sounds present  EXTREMITIES:  No clubbing

## 2019-11-13 NOTE — PROGRESS NOTE ADULT - SUBJECTIVE AND OBJECTIVE BOX
STEPH DAI  89y, Female  Allergy: penicillin (Rash)    Hospital Day: 9d    Patient seen and examined earlier today. Four episodes of watery diarrhea overnight.    PMH/PSH:  PAST MEDICAL & SURGICAL HISTORY:  Dyslipidemia  Hypertension  Atrial fibrillation  H/O abdominal hysterectomy  History of cholecystectomy    VITALS:  T(F): 96.2 (11-13-19 @ 12:20), Max: 97.4 (11-12-19 @ 20:47)  HR: 108 (11-13-19 @ 12:20)  BP: 101/67 (11-13-19 @ 12:20) (100/63 - 103/71)  RR: 17 (11-13-19 @ 12:20)  SpO2: --    TESTS & MEASUREMENTS:  Weight (Kg):       11-11-19 @ 07:01  -  11-12-19 @ 07:00  --------------------------------------------------------  IN: 780 mL / OUT: 0 mL / NET: 780 mL    11-12-19 @ 07:01  -  11-13-19 @ 07:00  --------------------------------------------------------  IN: 360 mL / OUT: 0 mL / NET: 360 mL    11-13-19 @ 07:01  -  11-13-19 @ 14:12  --------------------------------------------------------  IN: 200 mL / OUT: 0 mL / NET: 200 mL                            11.4   6.33  )-----------( 198      ( 13 Nov 2019 06:18 )             34.8     11-13    141  |  108  |  17  ----------------------------<  97  4.2   |  25  |  0.8    Ca    7.4<L>      13 Nov 2019 06:18  Mg     1.6     11-13    TPro  4.4<L>  /  Alb  1.9<L>  /  TBili  0.3  /  DBili  x   /  AST  14  /  ALT  5   /  AlkPhos  50  11-13    LIVER FUNCTIONS - ( 13 Nov 2019 06:18 )  Alb: 1.9 g/dL / Pro: 4.4 g/dL / ALK PHOS: 50 U/L / ALT: 5 U/L / AST: 14 U/L / GGT: x           GI PCR Panel, Stool (collected 11-06-19 @ 15:00)  Source: .Stool Feces  Final Report (11-07-19 @ 03:16):    GI PCR Results: NOT detected    *******Please Note:*******    GI panel PCR evaluates for:    Campylobacter, Plesiomonas shigelloides, Salmonella,    Vibrio, Yersinia enterocolitica, Enteroaggregative    Escherichia coli (EAEC), Enteropathogenic E.coli (EPEC),    Enterotoxigenic E. coli (ETEC) lt/st, Shiga-like    toxin-producing E. coli (STEC) stx1/stx2,    Shigella/ Enteroinvasive E. coli (EIEC), Cryptosporidium,    Cyclospora cayetanensis, Entamoeba histolytica,    Giardia lamblia, Adenovirus F 40/41, Astrovirus,    Norovirus GI/GII, Rotavirus A, Sapovirus    Culture - Stool (collected 11-06-19 @ 15:00)  Source: .Stool Feces  Final Report (11-08-19 @ 16:40):    No enteric pathogens isolated.    (Stool culture examined for Salmonella,    Shigella, Campylobacter, Aeromonas, Plesiomonas,    Vibrio, E.coli O157 and Yersinia)    MEDICATIONS:  MEDICATIONS  (STANDING):  chlorhexidine 4% Liquid 1 Application(s) Topical daily  diltiazem    milliGRAM(s) Oral every 24 hours  lactated ringers. 1000 milliLiter(s) (75 mL/Hr) IV Continuous <Continuous>  nystatin Powder 1 Application(s) Topical two times a day  rivaroxaban 10 milliGRAM(s) Oral with dinner  vancomycin    Solution 125 milliGRAM(s) Oral every 6 hours    HOME MEDICATIONS:  Calcium 500+D oral tablet, chewable (08-26)  ferrous sulfate 325 mg (65 mg elemental iron) oral tablet (08-26)  hydroCHLOROthiazide 12.5 mg oral tablet (08-26)  Xarelto 10 mg oral tablet (08-26)    REVIEW OF SYSTEMS:  All other review of systems is negative unless indicated above.     PHYSICAL EXAM:  GENERAL: NAD  HEENT: No Swelling  CHEST/LUNG: Good air entry, No wheezing  HEART: RRR, No murmurs  ABDOMEN: Soft, Bowel sounds present  EXTREMITIES:  No clubbing

## 2019-11-13 NOTE — PROGRESS NOTE ADULT - ASSESSMENT
IMPRESSION:  #Sepsis : resolved secondary to CD colitis  #CT Abdomen: Diffuse circumferential thickening of the colon with mild pericolonic   inflammation, compatible with pancolitis. No intraperitoneal free air or   drainable collection.  C-diff by PCR- Positive  Clinically with a significant improvement  No ongoing peritonitis  WBC 43.7>9.3>6.3    RECOMMENDATIONS:  po vancomycin 125 mg q6h for 2 weeks then q12h for 2 more weeks, then 125 mg q24h for 2 more weeks

## 2019-11-13 NOTE — PROGRESS NOTE ADULT - SUBJECTIVE AND OBJECTIVE BOX
SUBJECTIVE:    Patient is a 89y old  Female who presents with a chief complaint of Altered mental status (13 Nov 2019 14:12)    Currently admitted to medicine with the primary diagnosis of Colitis     Today is hospital day 9d.     Patient was seen at bedside this AM.  Patient is more alert and oriented than before. Denies abdominal pain, chest pain, fevers, chills, dysuria.  Patient had two bowel movements this AM.      PAST MEDICAL & SURGICAL HISTORY  PAST MEDICAL & SURGICAL HISTORY:  Dyslipidemia  Hypertension  Atrial fibrillation  H/O abdominal hysterectomy  History of cholecystectomy      ALLERGIES:  penicillin (Rash)    MEDICATIONS:  STANDING MEDICATIONS  chlorhexidine 4% Liquid 1 Application(s) Topical daily  diltiazem    milliGRAM(s) Oral every 24 hours  lactated ringers. 1000 milliLiter(s) IV Continuous <Continuous>  magnesium sulfate  IVPB 2 Gram(s) IV Intermittent once  nystatin Powder 1 Application(s) Topical two times a day  rivaroxaban 10 milliGRAM(s) Oral with dinner  vancomycin    Solution 125 milliGRAM(s) Oral every 6 hours    PRN MEDICATIONS    VITALS:   T(F): 96.2  HR: 108  BP: 101/67  RR: 17  SpO2: --    LABS:                        11.4   6.33  )-----------( 198      ( 13 Nov 2019 06:18 )             34.8     11-13    141  |  108  |  17  ----------------------------<  97  4.2   |  25  |  0.8    Ca    7.4<L>      13 Nov 2019 06:18  Mg     1.6     11-13    TPro  4.4<L>  /  Alb  1.9<L>  /  TBili  0.3  /  DBili  x   /  AST  14  /  ALT  5   /  AlkPhos  50  11-13                RADIOLOGY  < from: CT Abdomen and Pelvis No Cont (11.04.19 @ 20:49) >  IMPRESSION:       Diffuse circumferential thickening of the colon with mild pericolonic   inflammation, compatible with pancolitis. No intraperitoneal free air or   drainable collection.    < end of copied text >      PHYSICAL EXAM:  GEN: No acute distress.  Non-toxic and HD stable.   HEENT: NCAT  LUNGS: Clear to auscultation bilaterally   HEART: RRR. no murmurs  ABD: Soft, non-tender, non-distended.  No pain upon palpation on all four quadrants. +BS on all four quadrants.  No rebound tenderness, guarding on palpation on all four quadrants. No signs of peritonitis.   EXT: no edema  NEURO: AAOX2.      Assessment and Plan  89 yo F from senior resident w/ PMH of afib, HTN and DLD was brought in to hospital for altered mental status. The aid reported decreased appetite and frequent, watery bowel movements.     # AMS secondary to metabolic encephalopathy from septic shock  No changes to management as of 11/13/2019  # Septic shock secondary to C. Diff colitis-->Patient is hemodynamically stable while recieving IVF.   WBC downtrending to a normal of 6 today (down from 43K on admission).   ID-->Vancomycin 125mg PO [id number] for two weeks, q12 for two weeks and [id number] for two weeks.   - c/w w/ Dysphiga 1 w/ pureed thin liquids   - GI PCR negative, C diff positive, stool cx negative     # LARS likely prerenal secondary to septic shock, Resolved   # Sodium stable at 141 today.   - Creatinine at .8 today ([place]) (7.7 on admission)   - c/w LR at 75 and PO intake       # Mild elevation in troponin likely type II demand ischemia, stable   - asymptomatic   - EKG shows afib  - echo unremarkable    # Atrial fibrillation  - resume cardizem at 30mg q6h with parameter   - resume AC w/xarelto 10mg PO daily   Rate control has been adequate with cardizem.  Only 1 reading where HR<110.  Will continue to monitor.     # HTN, resolved  -Patient previoushly on lisinopril and HCTZ-->Holding both as patient's BP is trending in the 90s-100s/50s-60s today.  If it is elevated-->Will continue home BP meds.     Diet: pureed diet   GI ppx: N/A  DVT ppx: Xarelto  Activity: Increase as tolerated, pending PT/Rehab eval  Code status: Full Code (  ) / DNR ( X ) / DNI ( X )  Disposition: from home, discharge planning

## 2019-11-13 NOTE — PROGRESS NOTE ADULT - SUBJECTIVE AND OBJECTIVE BOX
STEPH DAI  89y, Female    All available historical data reviewed    OVERNIGHT EVENTS:  no fevers, had 2 BMs last night    ROS:  General: Denies rigors, night sweats  HEENT: Denies headache, rhinorrhea, sore throat, eye pain  CV: Denies CP, palpitations  PULM: Denies wheezing, hemoptysis  GI: Denies hematemesis, hematochezia, melena  : Denies discharge, hematuria  MSK: Denies arthralgias, myalgias  SKIN: Denies rash, lesions  NEURO: Denies paresthesias, weakness  PSYCH: Denies depression, anxiety    VITALS:  T(F): 96.5, Max: 97.4 (11-12-19 @ 20:47)  HR: 114  BP: 100/63  RR: 16Vital Signs Last 24 Hrs  T(C): 35.8 (13 Nov 2019 04:38), Max: 36.3 (12 Nov 2019 20:47)  T(F): 96.5 (13 Nov 2019 04:38), Max: 97.4 (12 Nov 2019 20:47)  HR: 114 (13 Nov 2019 04:38) (95 - 114)  BP: 100/63 (13 Nov 2019 04:38) (100/63 - 104/65)  BP(mean): --  RR: 16 (13 Nov 2019 04:38) (16 - 18)  SpO2: --    TESTS & MEASUREMENTS:                        11.4   6.33  )-----------( 198      ( 13 Nov 2019 06:18 )             34.8     11-13    141  |  108  |  17  ----------------------------<  97  4.2   |  25  |  0.8    Ca    7.4<L>      13 Nov 2019 06:18  Mg     1.6     11-13    TPro  4.4<L>  /  Alb  1.9<L>  /  TBili  0.3  /  DBili  x   /  AST  14  /  ALT  5   /  AlkPhos  50  11-13    LIVER FUNCTIONS - ( 13 Nov 2019 06:18 )  Alb: 1.9 g/dL / Pro: 4.4 g/dL / ALK PHOS: 50 U/L / ALT: 5 U/L / AST: 14 U/L / GGT: x             GI PCR Panel, Stool (collected 11-06-19 @ 15:00)  Source: .Stool Feces  Final Report (11-07-19 @ 03:16):    GI PCR Results: NOT detected    *******Please Note:*******    GI panel PCR evaluates for:    Campylobacter, Plesiomonas shigelloides, Salmonella,    Vibrio, Yersinia enterocolitica, Enteroaggregative    Escherichia coli (EAEC), Enteropathogenic E.coli (EPEC),    Enterotoxigenic E. coli (ETEC) lt/st, Shiga-like    toxin-producing E. coli (STEC) stx1/stx2,    Shigella/ Enteroinvasive E. coli (EIEC), Cryptosporidium,    Cyclospora cayetanensis, Entamoeba histolytica,    Giardia lamblia, Adenovirus F 40/41, Astrovirus,    Norovirus GI/GII, Rotavirus A, Sapovirus    Culture - Stool (collected 11-06-19 @ 15:00)  Source: .Stool Feces  Final Report (11-08-19 @ 16:40):    No enteric pathogens isolated.    (Stool culture examined for Salmonella,    Shigella, Campylobacter, Aeromonas, Plesiomonas,    Vibrio, E.coli O157 and Yersinia)            RADIOLOGY & ADDITIONAL TESTS:  Personal review of radiological diagnostics performed  Echo and EKG results noted when applicable.     ANTIBIOTICS:  vancomycin    Solution 125 milliGRAM(s) Oral every 6 hours

## 2019-11-14 NOTE — PROGRESS NOTE ADULT - SUBJECTIVE AND OBJECTIVE BOX
STEPH DAI  89y, Female    All available historical data reviewed    OVERNIGHT EVENTS:  no fevers, more alert,  diarrhea persists    ROS:  General: Denies rigors, night sweats  HEENT: Denies headache  CV: Denies CP, palpitations  PULM: Denies wheezing, hemoptysis  GI: Denies hematemesis, hematochezia, melena, has diarrhea  : Denies CVA pain  MSK: Denies arthralgias, myalgias  SKIN: Denies rash, lesions  NEURO: Denies paresthesias, weakness  PSYCH:     VITALS:  T(F): 97.1, Max: 99.2 (11-13-19 @ 20:00)  HR: 109  BP: 121/77  RR: 18Vital Signs Last 24 Hrs  T(C): 36.2 (14 Nov 2019 05:00), Max: 37.3 (13 Nov 2019 20:00)  T(F): 97.1 (14 Nov 2019 05:00), Max: 99.2 (13 Nov 2019 20:00)  HR: 109 (14 Nov 2019 05:00) (107 - 109)  BP: 121/77 (14 Nov 2019 05:00) (101/67 - 121/77)  BP(mean): --  RR: 18 (14 Nov 2019 05:00) (17 - 18)  SpO2: --    TESTS & MEASUREMENTS:                        12.7   6.87  )-----------( 113      ( 14 Nov 2019 07:27 )             40.6     11-14    139  |  105  |  15  ----------------------------<  92  4.8   |  20  |  0.8    Ca    7.6<L>      14 Nov 2019 07:27  Mg     2.0     11-14    TPro  5.2<L>  /  Alb  2.1<L>  /  TBili  0.4  /  DBili  x   /  AST  20  /  ALT  6   /  AlkPhos  65  11-14    LIVER FUNCTIONS - ( 14 Nov 2019 07:27 )  Alb: 2.1 g/dL / Pro: 5.2 g/dL / ALK PHOS: 65 U/L / ALT: 6 U/L / AST: 20 U/L / GGT: x                   RADIOLOGY & ADDITIONAL TESTS:  Personal review of radiological diagnostics performed  Echo and EKG results noted when applicable.     ANTIBIOTICS:  vancomycin    Solution 125 milliGRAM(s) Oral every 6 hours

## 2019-11-14 NOTE — PROGRESS NOTE ADULT - ATTENDING COMMENTS
I saw and evaluated patient  by bedside, still has diarrhea.    All labs, radiology studies, VS was reviewed  I have reviewed the resident's note and agree with documented findings and  plan of care.  Cdiff diarrhea- bowel rest, PO Vanco, IVF, contact isolation  Afib- uncontrolled rate -contiue xarelto, start Digoxin, continue Cardizem with holding parameters  HTN- b/p borderline low - continue IVF    #Progress Note Handoff: monitor for BM's, continue PO vanco , IVF  Family discussion: yes Disposition: to be determined

## 2019-11-14 NOTE — PROGRESS NOTE ADULT - ASSESSMENT
· Assessment		  IMPRESSION:  #Sepsis : resolved secondary to CD colitis  #CT Abdomen: Diffuse circumferential thickening of the colon with mild pericolonic   inflammation, compatible with pancolitis. No intraperitoneal free air or   drainable collection.  C-diff by PCR- Positive  Clinically with still ongoing diarrhea  No ongoing peritonitis  WBC 43.7>9.3>6.8  last lactate 1.4    RECOMMENDATIONS:  po vancomycin 125 mg q6h for 2 weeks then q12h for 2 more weeks, then 125 mg q24h for 2 more weeks  if diarrhea still persists in 24h will change to po Fidaxomicin  recheck lactate

## 2019-11-14 NOTE — PROGRESS NOTE ADULT - SUBJECTIVE AND OBJECTIVE BOX
SUBJECTIVE:    Patient is a 89y old  Female who presents with a chief complaint of Altered mental status (14 Nov 2019 10:07)    Currently admitted to medicine with the primary diagnosis of Colitis     Today is hospital day 10d.     Patient was seen at bedside this AM. As per PCA that was at bedside, the patient is still having persistent loose, watery BM "every hour."  Patient denies abdominal pain, fever, chills, chest pain, shortness of breath.  She complains of a rash that has been developing over the lower back and buttocks that she describes as a "burning" pain.     PAST MEDICAL & SURGICAL HISTORY  PAST MEDICAL & SURGICAL HISTORY:  Dyslipidemia  Hypertension  Atrial fibrillation  H/O abdominal hysterectomy  History of cholecystectomy      ALLERGIES:  penicillin (Rash)    MEDICATIONS:  STANDING MEDICATIONS  chlorhexidine 4% Liquid 1 Application(s) Topical daily  dextrose 5% + sodium chloride 0.9%. 1000 milliLiter(s) IV Continuous <Continuous>  diltiazem    milliGRAM(s) Oral every 24 hours  nystatin Powder 1 Application(s) Topical two times a day  rivaroxaban 10 milliGRAM(s) Oral with dinner  vancomycin    Solution 125 milliGRAM(s) Oral every 6 hours    PRN MEDICATIONS    VITALS:   T(F): 97.1  HR: 109  BP: 121/77  RR: 18  SpO2: --    LABS:                        12.7   6.87  )-----------( 113      ( 14 Nov 2019 07:27 )             40.6     11-14    139  |  105  |  15  ----------------------------<  92  4.8   |  20  |  0.8    Ca    7.6<L>      14 Nov 2019 07:27  Mg     2.0     11-14    TPro  5.2<L>  /  Alb  2.1<L>  /  TBili  0.4  /  DBili  x   /  AST  20  /  ALT  6   /  AlkPhos  65  11-14                  RADIOLOGY  < from: CT Abdomen and Pelvis No Cont (11.04.19 @ 20:49) >  IMPRESSION:       Diffuse circumferential thickening of the colon with mild pericolonic   inflammation, compatible with pancolitis. No intraperitoneal free air or   drainable collection.    < end of copied text >      PHYSICAL EXAM:  GEN: No acute distress.  Non-toxic and HD stable.   HEENT: NCAT  LUNGS: Clear to auscultation bilaterally   HEART: RRR. no murmurs  ABD: Soft, non-tender, non-distended.  No pain upon palpation on all four quadrants. +BS on all four quadrants.  No rebound tenderness, guarding on palpation on all four quadrants. No signs of peritonitis.   EXT: no edema  NEURO: AAOX2.      Assessment and Plan  89 yo F from senior resident w/ PMH of afib, HTN and DLD was brought in to hospital for altered mental status. The aid reported decreased appetite and frequent, watery bowel movements.     # AMS 2/2 Septic shock secondary to C. Diff colitis  Patient is still having loose watery bowel movements-->Will keep patient NPO and will start IVF D5 NS and reassess how many bowel movements she is having.    ID-->Continue with current vanc regimen at PO 125mg PO [id number] for two weeks, q12 for two weeks and [id number] for two weeks-->If BMs still persist-->Will switch to PO fidaxomicin  Lactate for 4pm set as per ID recommendation  No leukocytosis  on current CBC.    GI PCR negative, C diff positive, stool cx negative     # LARS likely prerenal secondary to septic shock, Resolved   # Sodium stable at 139 today.   - Creatinine at .8 today-->LARS has resolved.   Patient NPO but receiving D5 NS at 75cc/hour.       # Mild elevation in troponin likely type II demand ischemia, stable   - asymptomatic   - EKG shows afib  - echo unremarkable    # Atrial fibrillation  - Patient started on 120mg of Cardizem qdaily   - resume AC w/xarelto 10mg PO daily   HR has been fluctuating between 105-115BPM-->There is no flexibility in increasing her Cardizem due to BP consistently being low.     # HTN, resolved  -Patient previoushly on lisinopril and HCTZ-->Holding both as patient's BP is trending in the 90s-100s/50s-60s today.  Continue holding BP beds as patient's BP is consistently low.     Diet: NPO   GI ppx: N/A  DVT ppx: Xarelto  Activity: Increase as tolerated, pending PT/Rehab eval  Code status: Full Code (  ) / DNR ( X ) / DNI ( X )  Disposition: from home, discharge planning

## 2019-11-15 NOTE — PROGRESS NOTE ADULT - SUBJECTIVE AND OBJECTIVE BOX
SUBJECTIVE:    Patient is a 89y old  Female who presents with a chief complaint of Altered mental status (14 Nov 2019 13:50)    Currently admitted to medicine with the primary diagnosis of Colitis     Today is hospital day 11d.     Patient was seen at bedside this AM.  As per nurse, patient had 4 BM, all of which were loose and watery, last night.  This is an improvement in terms of frequency, but the symptoms are still consistent.  She currently denies fevers, chest pain, chills, shortness of breath, dysuria.      PAST MEDICAL & SURGICAL HISTORY  PAST MEDICAL & SURGICAL HISTORY:  Dyslipidemia  Hypertension  Atrial fibrillation  H/O abdominal hysterectomy  History of cholecystectomy      ALLERGIES:  penicillin (Rash)    MEDICATIONS:  STANDING MEDICATIONS  chlorhexidine 4% Liquid 1 Application(s) Topical daily  dextrose 5% + sodium chloride 0.9%. 1000 milliLiter(s) IV Continuous <Continuous>  digoxin     Tablet 0.125 milliGRAM(s) Oral daily  diltiazem    Tablet 30 milliGRAM(s) Oral every 6 hours  nystatin Powder 1 Application(s) Topical two times a day  rivaroxaban 10 milliGRAM(s) Oral with dinner  vancomycin    Solution 125 milliGRAM(s) Oral every 6 hours    PRN MEDICATIONS    VITALS:   T(F): 97.1  HR: 97  BP: 116/60  RR: 18  SpO2: --    LABS:                        11.7   5.36  )-----------( 256      ( 15 Nov 2019 07:12 )             36.7     11-15    140  |  107  |  13  ----------------------------<  110<H>  4.7   |  21  |  0.7    Ca    7.4<L>      15 Nov 2019 07:12  Mg     1.7     11-15    TPro  5.0<L>  /  Alb  2.0<L>  /  TBili  0.3  /  DBili  x   /  AST  23  /  ALT  7   /  AlkPhos  61  11-15          Lactate, Blood: 1.1 mmol/L (11-14-19 @ 16:28)            RADIOLOGY  < from: CT Abdomen and Pelvis No Cont (11.04.19 @ 20:49) >  IMPRESSION:       Diffuse circumferential thickening of the colon with mild pericolonic   inflammation, compatible with pancolitis. No intraperitoneal free air or   drainable collection.    < end of copied text >      PHYSICAL EXAM:  GEN: No acute distress.  Non-toxic and HD stable. Patient resting comfortably in bed.   HEENT: NCAT  LUNGS: Clear to auscultation bilaterally   HEART: RRR. no murmurs  ABD: Soft, non-tender, non-distended.  No pain upon palpation on all four quadrants. +BS on all four quadrants.  No rebound tenderness, guarding on palpation on all four quadrants.  EXT: no edema.  +2 pedal pulses.    NEURO: AAOX2.      Assessment and Plan  89 yo F from senior resident w/ PMH of afib, HTN and DLD was brought in to hospital for altered mental status. The aid reported decreased appetite and frequent, watery bowel movements.     # AMS 2/2 Septic shock secondary to C. Diff colitis  Patient is still having loose watery bowel movements (but decreased in frequency as of last night)  Lactate at 1.1 from yesterday within normal limits.   WBC at 5 today (drastic improvement from 43K on admission)   Because of patient's persistent BM-->Will consider switching patient from PO vanc to PO fidaxomicin.  Follow up ID recommendation.   GI PCR negative, C diff positive, stool cx negative     # LARS likely prerenal secondary to septic shock, Resolved   LARS has completely resolved  Patient's SCr is back to baseline (.7).  And Sodium is normal today (140 today)       # Mild elevation in troponin likely type II demand ischemia, stable   - asymptomatic   - EKG shows afib  - echo unremarkable    # Atrial fibrillation  - Patient started on 120mg of Cardizem qdaily   - resume AC w/xarelto 10mg PO daily   Patient's HR was uncontrolled yesterday.  Patient placed back on cardizem 30mg [id number] with holding parameters placed.  Patient digitized yesterday wtih .25mg PO digoxin today and will have .125mg PO digoxin daily thereafter.  Patient is tolerating the meds and well the HR as of last night improved (80 last night)     # HTN, resolved  -Patient previoushly on lisinopril and HCTZ-->Holding both as patient's BP is trending in the 90s-100s/50s-60s today.  Continue holding BP beds as patient's BP is consistently low.     Diet: NPO   GI ppx: N/A  DVT ppx: Xarelto  Activity: Increase as tolerated, pending PT/Rehab eval  Code status: Full Code (  ) / DNR ( X ) / DNI ( X )  Disposition: from home, discharge planning

## 2019-11-15 NOTE — PROGRESS NOTE ADULT - ATTENDING COMMENTS
I saw and evaluated patient  by bedside, still has diarrhea- less BM's - 4.    All labs, radiology studies, VS was reviewed  I have reviewed the resident's note and agree with documented findings and  plan of care.  Cdiff diarrhea- start on clear liquid diet, PO Vanco, IVF, contact isolation  Afib- better controlled rate -contiue xarelto, started on Digoxin, continue Cardizem with holding parameters  HTN- b/p borderline low - continue IVF    #Progress Note Handoff: monitor for BM's, continue PO vanco , IVF  Family discussion: yes Disposition: to be determined

## 2019-11-15 NOTE — PROGRESS NOTE ADULT - ASSESSMENT
· Assessment		  IMPRESSION:  #Sepsis : resolved secondary to CD colitis  #CT Abdomen: Diffuse circumferential thickening of the colon with mild pericolonic   inflammation, compatible with pancolitis. No intraperitoneal free air or   drainable collection.  C-diff by PCR- Positive  Clinically with still ongoing diarrhea  No ongoing peritonitis  WBC 43.7>9.3>6.8>5.3  last lactate 1.4>1.1    RECOMMENDATIONS:  Change po vancomycin to po Fidaxomicin 200 mg q12h for 10 days and then change to po vancomycin 125 mg q6h for 2 weeks then q12h for 2 more weeks, then 125 mg q24h for 2 more weeks

## 2019-11-15 NOTE — PROGRESS NOTE ADULT - SUBJECTIVE AND OBJECTIVE BOX
STEPH DAI  89y, Female    All available historical data reviewed    OVERNIGHT EVENTS:  no fevers, still with copious diarrhea    ROS:  not obtainable  VITALS:  T(F): 97.1, Max: 97.1 (11-15-19 @ 00:00)  HR: 97  BP: 116/60  RR: 18Vital Signs Last 24 Hrs  T(C): 36.2 (15 Nov 2019 00:00), Max: 36.2 (15 Nov 2019 00:00)  T(F): 97.1 (15 Nov 2019 00:00), Max: 97.1 (15 Nov 2019 00:00)  HR: 97 (15 Nov 2019 00:00) (80 - 116)  BP: 116/60 (15 Nov 2019 00:00) (88/53 - 116/60)  BP(mean): --  RR: 18 (15 Nov 2019 00:00) (16 - 18)  SpO2: --    TESTS & MEASUREMENTS:                        11.7   5.36  )-----------( 256      ( 15 Nov 2019 07:12 )             36.7     11-15    140  |  107  |  13  ----------------------------<  110<H>  4.7   |  21  |  0.7    Ca    7.4<L>      15 Nov 2019 07:12  Mg     1.7     11-15    TPro  5.0<L>  /  Alb  2.0<L>  /  TBili  0.3  /  DBili  x   /  AST  23  /  ALT  7   /  AlkPhos  61  11-15    LIVER FUNCTIONS - ( 15 Nov 2019 07:12 )  Alb: 2.0 g/dL / Pro: 5.0 g/dL / ALK PHOS: 61 U/L / ALT: 7 U/L / AST: 23 U/L / GGT: x                   RADIOLOGY & ADDITIONAL TESTS:  Personal review of radiological diagnostics performed  Echo and EKG results noted when applicable.     ANTIBIOTICS:  vancomycin    Solution 125 milliGRAM(s) Oral every 6 hours

## 2019-11-16 NOTE — PROGRESS NOTE ADULT - ASSESSMENT
· Assessment		  IMPRESSION:  #Sepsis : resolved secondary to CD colitis  #CT Abdomen: Diffuse circumferential thickening of the colon with mild pericolonic   inflammation, compatible with pancolitis. No intraperitoneal free air or   drainable collection.  C-diff by PCR- Positive  Significant reduction in number of BMs  No ongoing peritonitis  WBC 43.7>9.3>6.8>5.3  last lactate 1.4>1.1    RECOMMENDATIONS:  po Fidaxomicin 200 mg q12h for 10 days and then change to po vancomycin 125 mg q6h for 2 weeks then q12h for 2 more weeks, then 125 mg q24h for 2 more weeks

## 2019-11-16 NOTE — PROGRESS NOTE ADULT - SUBJECTIVE AND OBJECTIVE BOX
STEPH DAI  89y, Female    All available historical data reviewed    OVERNIGHT EVENTS:  less diarrhea, onle 2 last night    ROS:  not obtainable    VITALS:  T(F): 97.1, Max: 97.3 (11-15-19 @ 14:00)  HR: 94  BP: 114/65  RR: 18Vital Signs Last 24 Hrs  T(C): 36.2 (16 Nov 2019 05:00), Max: 36.3 (15 Nov 2019 14:00)  T(F): 97.1 (16 Nov 2019 05:00), Max: 97.3 (15 Nov 2019 14:00)  HR: 94 (16 Nov 2019 05:00) (93 - 97)  BP: 114/65 (15 Nov 2019 23:09) (95/63 - 114/65)  BP(mean): --  RR: 18 (16 Nov 2019 05:00) (18 - 20)  SpO2: --    TESTS & MEASUREMENTS:                        11.7   5.36  )-----------( 256      ( 15 Nov 2019 07:12 )             36.7     11-15    140  |  107  |  13  ----------------------------<  110<H>  4.7   |  21  |  0.7    Ca    7.4<L>      15 Nov 2019 07:12  Mg     1.7     11-15    TPro  5.0<L>  /  Alb  2.0<L>  /  TBili  0.3  /  DBili  x   /  AST  23  /  ALT  7   /  AlkPhos  61  11-15    LIVER FUNCTIONS - ( 15 Nov 2019 07:12 )  Alb: 2.0 g/dL / Pro: 5.0 g/dL / ALK PHOS: 61 U/L / ALT: 7 U/L / AST: 23 U/L / GGT: x                   RADIOLOGY & ADDITIONAL TESTS:  Personal review of radiological diagnostics performed  Echo and EKG results noted when applicable.     ANTIBIOTICS:  vancomycin    Solution 125 milliGRAM(s) Oral every 6 hours

## 2019-11-16 NOTE — PROGRESS NOTE ADULT - SUBJECTIVE AND OBJECTIVE BOX
STEPH DAI  [facility] T2-3A 027 A ([facility] T2-3A)            Patient was evaluated and examined  by bedside, had 2 episodes of diarrhea at night and 2 episodes of diarrhea in am, no abdominal pain        REVIEW OF SYSTEMS:  unable to obtain due to patient's confused state      T(C): , Max: 36.3 (11-15-19 @ 14:00)  HR: 94 (11-16-19 @ 05:00)  BP: 114/65 (11-15-19 @ 23:09)  RR: 18 (11-16-19 @ 05:00)  SpO2: 93% (11-16-19 @ 08:45)  CAPILLARY BLOOD GLUCOSE          PHYSICAL EXAM:  General: NAD, AAOX3, patient is laying comfortably in bed, confused  HEENT: AT, NC, Supple, NO JVD, NO CB  Lungs: CTA B/L, no wheezing, no rhonchi  CVS: normal S1, S2, RRR, NO M/G/R  Abdomen: soft, bowel sounds present, non-tender, non-distended  Extremities: no edema, no clubbing, no cyanosis, positive peripheral pulses b/l  Neuro: no acute focal neurological deficits, generalized body weakness, gait not tested  Skin: no rash, no ecchymosis      LAB  CBC  Date: 11-15-19 @ 07:12  Mean cell Nmwhlpjtrx86.5  Mean cell Hemoglobin Conc31.9  Mean cell Volum 92.7  Platelet count-Automate 256  RBC Count 3.96  Red Cell Distrib Width14.6  WBC Count5.36  % Albumin, Urine--  Hematocrit 36.7  Hemoglobin 11.7  CBC  Date: 11-14-19 @ 07:27  Mean cell Kdhpgobdpo70.5  Mean cell Hemoglobin Conc31.3  Mean cell Volum 94.4  Platelet count-Automate 113  RBC Count 4.30  Red Cell Distrib Width15.0  WBC Count6.87  % Albumin, Urine--  Hematocrit 40.6  Hemoglobin 12.7  CBC  Date: 11-13-19 @ 06:18  Mean cell Orkekddyqo67.2  Mean cell Hemoglobin Conc32.8  Mean cell Volum 92.3  Platelet count-Automate 198  RBC Count 3.77  Red Cell Distrib Width14.8  WBC Count6.33  % Albumin, Urine--  Hematocrit 34.8  Hemoglobin 11.4  CBC  Date: 11-12-19 @ 07:34  Mean cell Tubkzlucxp95.7  Mean cell Hemoglobin Conc32.0  Mean cell Volum 92.9  Platelet count-Automate 159  RBC Count 4.21  Red Cell Distrib Width14.8  WBC Count7.32  % Albumin, Urine--  Hematocrit 39.1  Hemoglobin 12.5  CBC  Date: 11-11-19 @ 07:06  Mean cell Bhfyhguvuk47.4  Mean cell Hemoglobin Conc31.3  Mean cell Volum 93.9  Platelet count-Automate 147  RBC Count 4.29  Red Cell Distrib Width14.9  WBC Count9.34  % Albumin, Urine--  Hematocrit 40.3  Hemoglobin 12.6  CBC  Date: 11-10-19 @ 06:12  Mean cell Hgzglmvoil69.4  Mean cell Hemoglobin Conc31.8  Mean cell Volum 92.4  Platelet count-Automate 120  RBC Count 4.08  Red Cell Distrib Width14.6  WBC Count7.77  % Albumin, Urine--  Hematocrit 37.7  Hemoglobin 12.0    [facility]  11-15-19 @ 07:12  Blood Gas Arterial-Calcium,Ionized--  Blood Urea Nitrogen, Serum 13 mg/dL [10 - 20]  Carbon Dioxide, Serum21 mmol/L [17 - 32]  Chloride, Ehqnm518 mmol/L [98 - 110]  Creatinie, Serum0.7 mg/dL [0.7 - 1.5]  Glucose, Rfvkw929 mg/dL<H> [70 - 99]  Potassium, Serum4.7 mmol/L [3.5 - 5.0] [Hemolyzed. Interpret with caution]  Sodium, Serum 140 mmol/L [135 - 146]  [facility]  11-14-19 @ 07:27  Blood Gas Arterial-Calcium,Ionized--  Blood Urea Nitrogen, Serum 15 mg/dL [10 - 20]  Carbon Dioxide, Serum20 mmol/L [17 - 32]  Chloride, Ywxkx099 mmol/L [98 - 110]  Creatinie, Serum0.8 mg/dL [0.7 - 1.5]  Glucose, Serum92 mg/dL [70 - 99]  Potassium, Serum4.8 mmol/L [3.5 - 5.0] [Slighty Hemolyzed use with Caution]  Sodium, Serum 139 mmol/L [135 - 146]  [facility]  11-13-19 @ 06:18  Blood Gas Arterial-Calcium,Ionized--  Blood Urea Nitrogen, Serum 17 mg/dL [10 - 20]  Carbon Dioxide, Serum25 mmol/L [17 - 32]  Chloride, Cerjg417 mmol/L [98 - 110]  Creatinie, Serum0.8 mg/dL [0.7 - 1.5]  Glucose, Serum97 mg/dL [70 - 99]  Potassium, Serum4.2 mmol/L [3.5 - 5.0]  Sodium, Serum 141 mmol/L [135 - 146]  [facility]  11-12-19 @ 07:34  Blood Gas Arterial-Calcium,Ionized--  Blood Urea Nitrogen, Serum 17 mg/dL [10 - 20]  Carbon Dioxide, Serum27 mmol/L [17 - 32]  Chloride, Xuqmf359 mmol/L [98 - 110]  Creatinie, Serum0.8 mg/dL [0.7 - 1.5]  Glucose, Serum92 mg/dL [70 - 99]  Potassium, Serum4.3 mmol/L [3.5 - 5.0]  Sodium, Serum 141 mmol/L [135 - 146]              Microbiology:    GI PCR Panel, Stool (collected 11-06-19 @ 15:00)  Source: .Stool Feces  Final Report (11-07-19 @ 03:16):    GI PCR Results: NOT detected    *******Please Note:*******    GI panel PCR evaluates for:    Campylobacter, Plesiomonas shigelloides, Salmonella,    Vibrio, Yersinia enterocolitica, Enteroaggregative    Escherichia coli (EAEC), Enteropathogenic E.coli (EPEC),    Enterotoxigenic E. coli (ETEC) lt/st, Shiga-like    toxin-producing E. coli (STEC) stx1/stx2,    Shigella/ Enteroinvasive E. coli (EIEC), Cryptosporidium,    Cyclospora cayetanensis, Entamoeba histolytica,    Giardia lamblia, Adenovirus F 40/41, Astrovirus,    Norovirus GI/GII, Rotavirus A, Sapovirus    Culture - Stool (collected 11-06-19 @ 15:00)  Source: .Stool Feces  Final Report (11-08-19 @ 16:40):    No enteric pathogens isolated.    (Stool culture examined for Salmonella,    Shigella, Campylobacter, Aeromonas, Plesiomonas,    Vibrio, E.coli O157 and Yersinia)    Culture - Blood (collected 11-04-19 @ 18:00)  Source: .Blood Blood-Peripheral  Final Report (11-10-19 @ 02:00):    No growth at 5 days.    Culture - Blood (collected 11-04-19 @ 18:00)  Source: .Blood Blood-Peripheral  Final Report (11-10-19 @ 02:00):    No growth at 5 days.    Culture - Urine (collected 11-04-19 @ 16:30)  Source: .Urine Clean Catch (Midstream)  Final Report (11-06-19 @ 00:19):    No growth          Medications:  chlorhexidine 4% Liquid 1 Application(s) Topical daily  dextrose 5% + sodium chloride 0.9%. 1000 milliLiter(s) IV Continuous <Continuous>  digoxin     Tablet 0.125 milliGRAM(s) Oral daily  diltiazem    Tablet 30 milliGRAM(s) Oral every 6 hours  nystatin Powder 1 Application(s) Topical two times a day  rivaroxaban 15 milliGRAM(s) Oral with dinner  vancomycin    Solution 125 milliGRAM(s) Oral every 6 hours        Assessment and Plan:  Cdiff diarrhea- start on clear liquid diet, PO Vanco, IVF, contact isolation  Afib- better controlled rate -contiue xarelto, started on Digoxin, continue Cardizem with holding parameters  HTN- b/p borderline low - continue IVF    #Progress Note Handoff: monitor for BM's, continue PO vanco , IVF, advance diet as tolerated  Family discussion: yes Disposition: to be determined .

## 2019-11-16 NOTE — PROGRESS NOTE ADULT - SUBJECTIVE AND OBJECTIVE BOX
SUBJECTIVE:    Patient is a 89y old  Female who presents with a chief complaint of Altered mental status (16 Nov 2019 13:17)    Currently admitted to medicine with the primary diagnosis of Colitis     Today is hospital day 12d.     Patient was seen at bedside this AM.  Denies abdominal pain, nausea, vomiting, fevers, chills.  Patient had two episodes of BM yesterday-->Has been improving significantly.      PAST MEDICAL & SURGICAL HISTORY  PAST MEDICAL & SURGICAL HISTORY:  Dyslipidemia  Hypertension  Atrial fibrillation  H/O abdominal hysterectomy  History of cholecystectomy      ALLERGIES:  penicillin (Rash)    MEDICATIONS:  STANDING MEDICATIONS  chlorhexidine 4% Liquid 1 Application(s) Topical daily  dextrose 5% + sodium chloride 0.9%. 1000 milliLiter(s) IV Continuous <Continuous>  digoxin     Tablet 0.125 milliGRAM(s) Oral daily  diltiazem    Tablet 30 milliGRAM(s) Oral every 6 hours  nystatin Powder 1 Application(s) Topical two times a day  rivaroxaban 15 milliGRAM(s) Oral with dinner  vancomycin    Solution 125 milliGRAM(s) Oral every 6 hours    PRN MEDICATIONS    VITALS:   T(F): 97.1  HR: 94  BP: 114/65  RR: 18  SpO2: 93%    LABS:                        11.7   5.36  )-----------( 256      ( 15 Nov 2019 07:12 )             36.7     11-15    140  |  107  |  13  ----------------------------<  110<H>  4.7   |  21  |  0.7    Ca    7.4<L>      15 Nov 2019 07:12  Mg     1.7     11-15    TPro  5.0<L>  /  Alb  2.0<L>  /  TBili  0.3  /  DBili  x   /  AST  23  /  ALT  7   /  AlkPhos  61  11-15                  RADIOLOGY  < from: CT Abdomen and Pelvis No Cont (11.04.19 @ 20:49) >  IMPRESSION:       Diffuse circumferential thickening of the colon with mild pericolonic   inflammation, compatible with pancolitis. No intraperitoneal free air or   drainable collection.    < end of copied text >      PHYSICAL EXAM:  GEN: No acute distress.  Non-toxic and HD stable. Patient resting comfortably in bed.   HEENT: NCAT  LUNGS: Clear to auscultation bilaterally   HEART: RRR. no murmurs  ABD: Soft, non-tender, non-distended.  No pain upon palpation on all four quadrants. +BS on all four quadrants.   EXT: no edema.  +2 pedal pulses.    NEURO: AAOX2.      Assessment and Plan  89 yo F from senior resident w/ PMH of afib, HTN and DLD was brought in to hospital for altered mental status. The aid reported decreased appetite and frequent, watery bowel movements.     # AMS 2/2 Septic shock secondary to C. Diff colitis  Two bowel movements yesterday-->Drastically decreasing in frequency of diarrhea episodes  Although ID recommended fidaxomicin yesterday-->Hospital has none of it in stock-->will stick to current vancomycin regimen (a5kykqa 125 PO for 2 weeks, q12 for two weeks and q24 for two weeks)  Will continue to follow CBC for WBC monitoring.   GI PCR negative, C diff positive, stool cx negative     # LARS likely prerenal secondary to septic shock, Resolved   LARS has completely resolved  Patient's SCr is back to baseline (.7).   Will continue to monitor CMP       # Mild elevation in troponin likely type II demand ischemia, stable   - asymptomatic   - EKG shows afib  - echo unremarkable    # Atrial fibrillation  - Patient started on 120mg of Cardizem qdaily   - Xarelto increased to 15mg PO daily as patient RUE DVT while on 10mg PO daily of xarelto.   HR significantly controlled w/digoxin .125mg PO daily.  HR in 90s throughout the day.    # HTN, resolved  -Patient previoushly on lisinopril and HCTZ-->Holding both as patient's BP is trending in the 90s-100s/50s-60s today.  SBP in the 110s today.  Will continue to hold BP meds unless BP is elevated.     Diet: NPO   GI ppx: N/A  DVT ppx: Xarelto  Activity: Increase as tolerated, pending PT/Rehab eval  Code status: Full Code (  ) / DNR ( X ) / DNI ( X )  Disposition: from home, discharge planning

## 2019-11-16 NOTE — PROGRESS NOTE ADULT - GASTROINTESTINAL DETAILS
soft/nontender
no guarding/nontender/soft/no rebound tenderness
no guarding/no rebound tenderness/no rigidity/nontender/soft

## 2019-11-16 NOTE — PROGRESS NOTE ADULT - CARDIOVASCULAR
Regular rate & rhythm, normal S1, S2; no murmurs, gallops or rubs; no S3, S4
Regular rate & rhythm, normal S1, S2; no murmurs, gallops or rubs; no S3, S4
detailed exam
detailed exam

## 2019-11-17 NOTE — PROGRESS NOTE ADULT - SUBJECTIVE AND OBJECTIVE BOX
STEPH DAI  [facility] T2-3A 027 A ([facility] T2-3A)            Patient was evaluated and examined  by bedside, still has multiple loose bm's, tolerating clear liquid diet        REVIEW OF SYSTEMS:  unable to obtain due to patient's chronic dementia    T(C): , Max: 37 (11-16-19 @ 20:00)  HR: 107 (11-17-19 @ 05:58)  BP: 118/69 (11-17-19 @ 05:58)  RR: 18 (11-17-19 @ 05:58)  SpO2: --  CAPILLARY BLOOD GLUCOSE          PHYSICAL EXAM:  General: NAD, AAOX3, patient is laying comfortably in bed, confused  HEENT: AT, NC, Supple, NO JVD, NO CB  Lungs: CTA B/L, no wheezing, no rhonchi  CVS: normal S1, S2, RRR, NO M/G/R  Abdomen: soft, bowel sounds present, non-tender, non-distended  Extremities: no edema, no clubbing, no cyanosis, positive peripheral pulses b/l  Neuro: no acute focal neurological deficits, generalized body weakness, gait not tested, chronic confusion state  Skin: no rash, no ecchymosis      LAB  CBC  Date: 11-15-19 @ 07:12  Mean cell Fbwbmzedxe71.5  Mean cell Hemoglobin Conc31.9  Mean cell Volum 92.7  Platelet count-Automate 256  RBC Count 3.96  Red Cell Distrib Width14.6  WBC Count5.36  % Albumin, Urine--  Hematocrit 36.7  Hemoglobin 11.7  CBC  Date: 11-14-19 @ 07:27  Mean cell Wiekdrpivk70.5  Mean cell Hemoglobin Conc31.3  Mean cell Volum 94.4  Platelet count-Automate 113  RBC Count 4.30  Red Cell Distrib Width15.0  WBC Count6.87  % Albumin, Urine--  Hematocrit 40.6  Hemoglobin 12.7  CBC  Date: 11-13-19 @ 06:18  Mean cell Fqpqtuelpm83.2  Mean cell Hemoglobin Conc32.8  Mean cell Volum 92.3  Platelet count-Automate 198  RBC Count 3.77  Red Cell Distrib Width14.8  WBC Count6.33  % Albumin, Urine--  Hematocrit 34.8  Hemoglobin 11.4  CBC  Date: 11-12-19 @ 07:34  Mean cell Utbccfvqsq23.7  Mean cell Hemoglobin Conc32.0  Mean cell Volum 92.9  Platelet count-Automate 159  RBC Count 4.21  Red Cell Distrib Width14.8  WBC Count7.32  % Albumin, Urine--  Hematocrit 39.1  Hemoglobin 12.5  CBC  Date: 11-11-19 @ 07:06  Mean cell Xzgggvwnwj77.4  Mean cell Hemoglobin Conc31.3  Mean cell Volum 93.9  Platelet count-Automate 147  RBC Count 4.29  Red Cell Distrib Width14.9  WBC Count9.34  % Albumin, Urine--  Hematocrit 40.3  Hemoglobin 12.6    [facility]  11-15-19 @ 07:12  Blood Gas Arterial-Calcium,Ionized--  Blood Urea Nitrogen, Serum 13 mg/dL [10 - 20]  Carbon Dioxide, Serum21 mmol/L [17 - 32]  Chloride, Llzoj222 mmol/L [98 - 110]  Creatinie, Serum0.7 mg/dL [0.7 - 1.5]  Glucose, Espyb869 mg/dL<H> [70 - 99]  Potassium, Serum4.7 mmol/L [3.5 - 5.0] [Hemolyzed. Interpret with caution]  Sodium, Serum 140 mmol/L [135 - 146]  [facility]  11-14-19 @ 07:27  Blood Gas Arterial-Calcium,Ionized--  Blood Urea Nitrogen, Serum 15 mg/dL [10 - 20]  Carbon Dioxide, Serum20 mmol/L [17 - 32]  Chloride, Lfzbt892 mmol/L [98 - 110]  Creatinie, Serum0.8 mg/dL [0.7 - 1.5]  Glucose, Serum92 mg/dL [70 - 99]  Potassium, Serum4.8 mmol/L [3.5 - 5.0] [Slighty Hemolyzed use with Caution]  Sodium, Serum 139 mmol/L [135 - 146]  [facility]  11-13-19 @ 06:18  Blood Gas Arterial-Calcium,Ionized--  Blood Urea Nitrogen, Serum 17 mg/dL [10 - 20]  Carbon Dioxide, Serum25 mmol/L [17 - 32]  Chloride, Mztuq521 mmol/L [98 - 110]  Creatinie, Serum0.8 mg/dL [0.7 - 1.5]  Glucose, Serum97 mg/dL [70 - 99]  Potassium, Serum4.2 mmol/L [3.5 - 5.0]  Sodium, Serum 141 mmol/L [135 - 146]              Microbiology:    GI PCR Panel, Stool (collected 11-06-19 @ 15:00)  Source: .Stool Feces  Final Report (11-07-19 @ 03:16):    GI PCR Results: NOT detected    *******Please Note:*******    GI panel PCR evaluates for:    Campylobacter, Plesiomonas shigelloides, Salmonella,    Vibrio, Yersinia enterocolitica, Enteroaggregative    Escherichia coli (EAEC), Enteropathogenic E.coli (EPEC),    Enterotoxigenic E. coli (ETEC) lt/st, Shiga-like    toxin-producing E. coli (STEC) stx1/stx2,    Shigella/ Enteroinvasive E. coli (EIEC), Cryptosporidium,    Cyclospora cayetanensis, Entamoeba histolytica,    Giardia lamblia, Adenovirus F 40/41, Astrovirus,    Norovirus GI/GII, Rotavirus A, Sapovirus    Culture - Stool (collected 11-06-19 @ 15:00)  Source: .Stool Feces  Final Report (11-08-19 @ 16:40):    No enteric pathogens isolated.    (Stool culture examined for Salmonella,    Shigella, Campylobacter, Aeromonas, Plesiomonas,    Vibrio, E.coli O157 and Yersinia)    Culture - Blood (collected 11-04-19 @ 18:00)  Source: .Blood Blood-Peripheral  Final Report (11-10-19 @ 02:00):    No growth at 5 days.    Culture - Blood (collected 11-04-19 @ 18:00)  Source: .Blood Blood-Peripheral  Final Report (11-10-19 @ 02:00):    No growth at 5 days.    Culture - Urine (collected 11-04-19 @ 16:30)  Source: .Urine Clean Catch (Midstream)  Final Report (11-06-19 @ 00:19):    No growth          Medications:  chlorhexidine 4% Liquid 1 Application(s) Topical daily  dextrose 5% + sodium chloride 0.9%. 1000 milliLiter(s) IV Continuous <Continuous>  digoxin     Tablet 0.125 milliGRAM(s) Oral daily  diltiazem    Tablet 30 milliGRAM(s) Oral every 6 hours  nystatin Powder 1 Application(s) Topical two times a day  rivaroxaban 15 milliGRAM(s) Oral with dinner  vancomycin    Solution 125 milliGRAM(s) Oral every 6 hours        Assessment and Plan:  Cdiff diarrhea- patient will persistent diarrhea, now need to be switched to PO Fidaxomicin  200 mg po twice daily x 10 days, started on clear liquid diet,  IVF, contact isolation  Afib- better controlled rate -contiue xarelto, started on Digoxin, continue Cardizem with holding parameters  HTN- b/p borderline low - continue IVF    #Progress Note Handoff: monitor for BM's, continue PO Dificid , IVF, advance diet as tolerated  Family discussion: yes Disposition: to be determined .

## 2019-11-18 NOTE — PROGRESS NOTE ADULT - ATTENDING COMMENTS
I saw and evaluated patient  by bedside, had one loose bm at night, tolerating clear liquid diet.    All labs, radiology studies, VS was reviewed  I have reviewed the resident's note and agree with documented findings and  plan of care.    Cdiff diarrhea- patient will persistent diarrhea, now need to be switched to PO Fidaxomicin  200 mg po twice daily x 10 days( day 2), started on clear liquid diet,  IVF, contact isolation  Afib- better controlled rate -contiue xarelto, started on Digoxin, continue Cardizem with holding parameters  HTN-  - continue current meds tx.    #Progress Note Handoff: monitor for BM's, continue PO Dificid , IVF, advance diet as tolerated  Family discussion: yes Disposition: to be determined .

## 2019-11-18 NOTE — CHART NOTE - NSCHARTNOTEFT_GEN_A_CORE
Patient has no history of mastectomy.  only a RUE DVT, for which there is no contraindication for arm precautions.  Will remove both arm precautions today. Patient has no history of mastectomy and nor does the patient have an AVF.  Only has a RUE DVT, for which there is no contraindication for arm precautions.  Will remove both arm precautions today.

## 2019-11-18 NOTE — CHART NOTE - NSCHARTNOTEFT_GEN_A_CORE
Registered Dietitian Follow-Up     Patient Profile Reviewed                           Yes [x]   No []     Nutrition History Previously Obtained        Yes []  No [x] no family at b/s. unable to reach emergency contact. Weight hx: pt weighed 49.9 kg on (9/11/19) on previous adm. Weight stable to PTA.        Pertinent Subjective Information: Pt was not at risk as she was previously consuming ~100% of meals at 1:1 feeds, however, pt made NPO and then clear liquids within last 4 days. Upgraded to fulls today. Will continue to monitor pt's PO intake, no diet interventions at present as it is expected that pt to eat normally as diet is advanced.     Pertinent Medical Interventions: AMS 2/2 septic shok 2/2 C diff colitis, frequency improving. LARS likely prerenal 2/2 septic shock- resolved.      Diet order: full liquids     Anthropometrics:  - Ht. no height in EMR, observed pt is ~60in   - Wt. 50kg (11/4) no new wt  - %wt change  - BMI   - IBW      Pertinent Lab Data: (11/18) H/H 11.5/36.1, BUN 8, s gluc 117, Mg 1.4     Pertinent Meds: xarelto, dificid, D5NS 75ml/h, digoxin     Physical Findings:  - Appearance: pleasant, confused, edema 2+ R arm  - GI function: LBM 11/18 loose  - Tubes:  - Oral/Mouth cavity: per previous RD: ?pt edentulous. Pt would not open her mouth wide enough to see  - Skin: stage 2 b/l buttocks- healing     Nutrition Requirements  Weight Used: 50kg, needs cont from RD assessment 11/7     Energy: 5711-9421 kcal/day (30-35 kcal/kg ABW)    Protein: 63-75 g/day (1.25-1.5 g/kg ABW)    Fluids: 1 mL/kcal     Nutrient Intake: not meeting needs x3-4 days as diet previously npo/clears        [] Previous Nutrition Diagnosis:  Increased Nutrient Needs            [x] Ongoing          [] Resolved    [] No active nutrition diagnosis identified at this time    New Nutrition Dx: inadequate protein-energy intake  Etiology: diet status  S/S: pt previously NPO and clear liquids x3-4 days, providing insufficient nutrition     Nutrition Intervention meal/snack, vitamin/minerals     Goal/Expected Outcome: Pt to consume/tolerate >75% of meal tray in 3 days      Indicator/Monitoring: RD to monitor energy intake, diet order, body comp, NFPF.     Recommendation: Continue full liquids and advance per LIP.  Consider probiotics and MVI w/ minerals daily. Consult SLP for eval if signs of aspiration/choking. No further diet interventions as it is expected for pt to return to baseline PO intake as diet is advanced.

## 2019-11-18 NOTE — PROGRESS NOTE ADULT - SUBJECTIVE AND OBJECTIVE BOX
SUBJECTIVE:    Patient is a 89y old  Female who presents with a chief complaint of Altered mental status (17 Nov 2019 11:34)    Currently admitted to medicine with the primary diagnosis of Colitis     Today is hospital day 14d.     Patient was seen at bedside this AM. As per nurse, she had loose BM today.   When I talked to her today (had to raise my voice slightly for her to hear me) she denied any abdominal pain or discomfort. She was resting comfortably in bed when I saw her today. She also denies chest pain, shortness of breath, fevers, or rigors.    PAST MEDICAL & SURGICAL HISTORY  PAST MEDICAL & SURGICAL HISTORY:  Dyslipidemia  Hypertension  Atrial fibrillation  H/O abdominal hysterectomy  History of cholecystectomy      ALLERGIES:  penicillin (Rash)    MEDICATIONS:  STANDING MEDICATIONS  chlorhexidine 4% Liquid 1 Application(s) Topical daily  dextrose 5% + sodium chloride 0.9%. 1000 milliLiter(s) IV Continuous <Continuous>  digoxin     Tablet 0.125 milliGRAM(s) Oral daily  diltiazem    Tablet 30 milliGRAM(s) Oral every 6 hours  fidaxomicin 200 milliGRAM(s) Oral two times a day  nystatin Powder 1 Application(s) Topical two times a day  rivaroxaban 15 milliGRAM(s) Oral with dinner    PRN MEDICATIONS    VITALS:   T(F): 97.2  HR: 106  BP: 150/79  RR: 20  SpO2: 96%    LABS:                        RADIOLOGY  < from: CT Abdomen and Pelvis No Cont (11.04.19 @ 20:49) >  IMPRESSION:       Diffuse circumferential thickening of the colon with mild pericolonic   inflammation, compatible with pancolitis. No intraperitoneal free air or   drainable collection.    < end of copied text >      PHYSICAL EXAM:  GEN: No acute distress.  Non-toxic and HD stable. Patient resting comfortably in bed.   HEENT: NCAT  LUNGS: Clear to auscultation bilaterally   HEART: RRR. no murmurs  ABD: Soft, non-tender, non-distended.  No pain upon palpation on all four quadrants. +BS on all four quadrants.   EXT: no edema.  +2 pedal pulses.    NEURO: AAOX2.      Assessment and Plan  89 yo F from senior resident w/ PMH of afib, HTN and DLD was brought in to hospital for altered mental status. The aid reported decreased appetite and frequent, watery bowel movements.     # AMS 2/2 Septic shock secondary to C. Diff colitis  Patient had only one BM yesterday.   Patient was started on fidaxomicin yesterday-->Currently on fidaxomicin 200mg PO bid for 10 day course (end date is 11/26/2019)   Vanc discontinued as fidaxomicin is already added on.   After fidaxomicin is done-->will transition patient  prevous vancomycin regimen ([id number] 125 PO for 2 weeks, q12 for two weeks and q24 for two weeks)  WBC within normal limits today at 5  GI PCR negative, C diff positive, stool cx negative   Patient was previously NPO for bowel rest-->Frequency of BM improved-->Patient placed on CLD over the weekend.  Will continue to assess for BMs-->If there is further improvement of Bowel movements by the evening-->Could possibly upgrade patient to full liquids.   c/w D5W NS at 75cc/hour     # LARS likely prerenal secondary to septic shock, Resolved   LARS has completely resolved  Patient's SCr is back to baseline (.7).   Will continue to monitor CMP       # Mild elevation in troponin likely type II demand ischemia, stable   - asymptomatic   - EKG shows afib  - echo unremarkable    # Atrial fibrillation  - Patient started on 120mg of Cardizem qdaily   - Xarelto increased to 15mg PO daily as patient RUE DVT while on 10mg PO daily of xarelto.   Rate control with digoxin .125mg PO daily-->Patient still trending in 90s-100s for A-Fib-->Goal is <110HR.      # HTN, resolved  -Patient previoushly on lisinopril and HCTZ      Diet: CLD   GI ppx: N/A  DVT ppx: Xarelto  Activity: Increase as tolerated, pending PT/Rehab eval  Code status: Full Code (  ) / DNR ( X ) / DNI ( X )  Disposition: from home, discharge planning SUBJECTIVE:    Patient is a 89y old  Female who presents with a chief complaint of Altered mental status (17 Nov 2019 11:34)    Currently admitted to medicine with the primary diagnosis of Colitis     Today is hospital day 14d.     Patient was seen at bedside this AM. As per nurse, she had loose BM today.   When I talked to her today (had to raise my voice slightly for her to hear me) she denied any abdominal pain or discomfort. She was resting comfortably in bed when I saw her today. She also denies chest pain, shortness of breath, fevers, or rigors.    PAST MEDICAL & SURGICAL HISTORY  PAST MEDICAL & SURGICAL HISTORY:  Dyslipidemia  Hypertension  Atrial fibrillation  H/O abdominal hysterectomy  History of cholecystectomy      ALLERGIES:  penicillin (Rash)    MEDICATIONS:  STANDING MEDICATIONS  chlorhexidine 4% Liquid 1 Application(s) Topical daily  dextrose 5% + sodium chloride 0.9%. 1000 milliLiter(s) IV Continuous <Continuous>  digoxin     Tablet 0.125 milliGRAM(s) Oral daily  diltiazem    Tablet 30 milliGRAM(s) Oral every 6 hours  fidaxomicin 200 milliGRAM(s) Oral two times a day  nystatin Powder 1 Application(s) Topical two times a day  rivaroxaban 15 milliGRAM(s) Oral with dinner    PRN MEDICATIONS    VITALS:   T(F): 97.2  HR: 106  BP: 150/79  RR: 20  SpO2: 96%    LABS:                        RADIOLOGY  < from: CT Abdomen and Pelvis No Cont (11.04.19 @ 20:49) >  IMPRESSION:       Diffuse circumferential thickening of the colon with mild pericolonic   inflammation, compatible with pancolitis. No intraperitoneal free air or   drainable collection.    < end of copied text >      PHYSICAL EXAM:  GEN: No acute distress.  Non-toxic and HD stable. Patient resting comfortably in bed.   HEENT: NCAT  LUNGS: Clear to auscultation bilaterally   HEART: RRR. no murmurs  ABD: Soft, non-tender, non-distended.  No pain upon palpation on all four quadrants. +BS on all four quadrants.   EXT: no edema.  +2 pedal pulses.    NEURO: AAOX2.      Assessment and Plan  89 yo F from senior resident w/ PMH of afib, HTN and DLD was brought in to hospital for altered mental status. The aid reported decreased appetite and frequent, watery bowel movements.     # AMS 2/2 Septic shock secondary to C. Diff colitis  Patient had only one BM yesterday.   Patient was started on fidaxomicin yesterday-->Currently on fidaxomicin 200mg PO bid for 10 day course (end date is 11/26/2019)   Vanc discontinued as fidaxomicin is already added on.   After fidaxomicin is done-->will transition patient  prevous vancomycin regimen ([id number] 125 PO for 2 weeks, q12 for two weeks and q24 for two weeks)  WBC within normal limits today at 5  GI PCR negative, C diff positive, stool cx negative   Patient was previously NPO for bowel rest-->Frequency of BM improved-->Patient placed on CLD over the weekend.  Will continue to assess for BMs-->If there is further improvement of Bowel movements by the evening-->Could possibly upgrade patient to full liquids.   c/w D5W NS at 75cc/hour     # LARS likely prerenal secondary to septic shock, Resolved   LARS has completely resolved  Patient's SCr is back to baseline (.7).   Will continue to monitor CMP       # Mild elevation in troponin likely type II demand ischemia, stable   - asymptomatic   - EKG shows afib  - echo unremarkable    # Atrial fibrillation  - Patient started on 120mg of Cardizem qdaily   - Xarelto increased to 15mg PO daily as patient RUE DVT while on 10mg PO daily of xarelto.   Rate control with digoxin .125mg PO daily-->Patient still trending in 90s-100s for A-Fib-->Goal is <110HR.      # HTN, resolved  -Patient previoushly on lisinopril and HCTZ  Patient's BP has been uptrending to the to the 170s-180s/80s-90s. Last check was 150/79-->Will d/w attending as to start patient on lisinopril if HTN persists.         Diet: CLD   GI ppx: N/A  DVT ppx: Xarelto  Activity: Increase as tolerated, pending PT/Rehab eval  Code status: Full Code (  ) / DNR ( X ) / DNI ( X )  Disposition: from home-->Clinically improving-->Will continue to monitor patient's BM.

## 2019-11-19 NOTE — PROGRESS NOTE ADULT - SUBJECTIVE AND OBJECTIVE BOX
SUBJECTIVE:    Patient is a 89y old  Female who presents with a chief complaint of Altered mental status (18 Nov 2019 11:34)    Currently admitted to medicine with the primary diagnosis of Colitis     Today is hospital day 15d.     Patient was seen at bedside this AM.     PAST MEDICAL & SURGICAL HISTORY  PAST MEDICAL & SURGICAL HISTORY:  Dyslipidemia  Hypertension  Atrial fibrillation  H/O abdominal hysterectomy  History of cholecystectomy      ALLERGIES:  penicillin (Rash)    MEDICATIONS:  STANDING MEDICATIONS  chlorhexidine 4% Liquid 1 Application(s) Topical daily  dextrose 5% + sodium chloride 0.9%. 1000 milliLiter(s) IV Continuous <Continuous>  digoxin     Tablet 0.125 milliGRAM(s) Oral daily  diltiazem    Tablet 30 milliGRAM(s) Oral every 6 hours  fidaxomicin 200 milliGRAM(s) Oral two times a day  nystatin Powder 1 Application(s) Topical two times a day  rivaroxaban 15 milliGRAM(s) Oral with dinner    PRN MEDICATIONS    VITALS:   T(F): 97.3  HR: 104  BP: 128/76  RR: 18  SpO2: --    LABS:                        11.5   6.58  )-----------( 329      ( 18 Nov 2019 15:31 )             36.1     11-18    140  |  109  |  8<L>  ----------------------------<  117<H>  3.9   |  23  |  0.7    Ca    7.2<L>      18 Nov 2019 15:31  Mg     1.4     11-18    TPro  4.9<L>  /  Alb  1.9<L>  /  TBili  0.3  /  DBili  x   /  AST  17  /  ALT  7   /  AlkPhos  79  11-18                  RADIOLOGY:    PHYSICAL EXAM:  GEN: No acute distress  HEENT: NCAT  LUNGS: Clear to auscultation bilaterally   HEART: RRR. no murmurs  ABD: Soft, non-tender, non-distended  EXT: no edema  NEURO: AAOX3    Assessment and Plan: SUBJECTIVE:    Patient is a 89y old  Female who presents with a chief complaint of Altered mental status (18 Nov 2019 11:34)    Currently admitted to medicine with the primary diagnosis of Colitis     Today is hospital day 15d.     Patient was seen at bedside this AM. Patient's bowel movement frequency has significantly improved over the last few days. She had one BM in the PM and one this morning, both of which were loose and watery.  Patient is afebrile, resting comfortably in bed and is HD stable.  No acute overnight events.    PAST MEDICAL & SURGICAL HISTORY  PAST MEDICAL & SURGICAL HISTORY:  Dyslipidemia  Hypertension  Atrial fibrillation  H/O abdominal hysterectomy  History of cholecystectomy      ALLERGIES:  penicillin (Rash)    MEDICATIONS:  STANDING MEDICATIONS  chlorhexidine 4% Liquid 1 Application(s) Topical daily  dextrose 5% + sodium chloride 0.9%. 1000 milliLiter(s) IV Continuous <Continuous>  digoxin     Tablet 0.125 milliGRAM(s) Oral daily  diltiazem    Tablet 30 milliGRAM(s) Oral every 6 hours  fidaxomicin 200 milliGRAM(s) Oral two times a day  nystatin Powder 1 Application(s) Topical two times a day  rivaroxaban 15 milliGRAM(s) Oral with dinner    PRN MEDICATIONS    VITALS:   T(F): 97.3  HR: 104  BP: 128/76  RR: 18  SpO2: --    LABS:                        11.5   6.58  )-----------( 329      ( 18 Nov 2019 15:31 )             36.1     11-18    140  |  109  |  8<L>  ----------------------------<  117<H>  3.9   |  23  |  0.7    Ca    7.2<L>      18 Nov 2019 15:31  Mg     1.4     11-18    TPro  4.9<L>  /  Alb  1.9<L>  /  TBili  0.3  /  DBili  x   /  AST  17  /  ALT  7   /  AlkPhos  79  11-18                  RADIOLOGY:  < from: CT Abdomen and Pelvis No Cont (11.04.19 @ 20:49) >  IMPRESSION:       Diffuse circumferential thickening of the colon with mild pericolonic   inflammation, compatible with pancolitis. No intraperitoneal free air or   drainable collection.    < end of copied text >  PHYSICAL EXAM:  GEN: No acute distress.  Non-toxic and HD stable. Patient resting comfortably in bed.   HEENT: NCAT  LUNGS: Clear to auscultation bilaterally   HEART: Irregular rate and rhythm.  But no murmurs.   ABD: Soft, non-tender, non-distended.  No pain upon palpation on all four quadrants. +BS on all four quadrants.   EXT: no edema.  +2 pedal pulses.    NEURO: AAOX1.      Assessment and Plan    # AMS 2/2 Septic shock secondary to C. Diff colitis  Patient had only one BM yesterday.   Patient was started on fidaxomicin yesterday-->Currently on fidaxomicin 200mg PO bid for 10 day course (end date is 11/26/2019)   Vanc discontinued as fidaxomicin is already added on.   After fidaxomicin is done-->will transition patient  prevous vancomycin regimen ([id number] 125 PO for 2 weeks, q12 for two weeks and q24 for two weeks)  WBC within normal limits today at 5  GI PCR negative, C diff positive, stool cx negative   Patient was previously NPO for bowel rest-->Frequency of BM improved-->Patient placed on CLD over the weekend.  Will continue to assess for BMs-->If there is further improvement of Bowel movements by the evening-->Could possibly upgrade patient to full liquids.   c/w D5W NS at 75cc/hour     # LARS likely prerenal secondary to septic shock, Resolved   LARS has completely resolved  Patient's SCr is back to baseline (.7).   Will continue to monitor CMP       # Mild elevation in troponin likely type II demand ischemia, stable   - asymptomatic   - EKG shows afib  - echo unremarkable    # Atrial fibrillation  - Patient started on 120mg of Cardizem qdaily   - Xarelto increased to 15mg PO daily as patient RUE DVT while on 10mg PO daily of xarelto.   Rate control with digoxin .125mg PO daily-->Patient still trending in 90s-100s for A-Fib-->Goal is <110HR.      # HTN, resolved  -Patient previoushly on lisinopril and HCTZ  Patient's BP has been uptrending to the to the 170s-180s/80s-90s. Last check was 150/79-->Will d/w attending as to start patient on lisinopril if HTN persists.         Diet: CLD   GI ppx: N/A  DVT ppx: Xarelto  Activity: Increase as tolerated, pending PT/Rehab eval  Code status: Full Code (  ) / DNR ( X ) / DNI ( X )  Disposition: from home-->Clinically improving-->Will continue to monitor patient's BM. SUBJECTIVE:    Patient is a 89y old  Female who presents with a chief complaint of Altered mental status (18 Nov 2019 11:34)    Currently admitted to medicine with the primary diagnosis of Colitis     Today is hospital day 15d.     Patient was seen at bedside this AM. Patient's bowel movement frequency has significantly improved over the last few days. She had one BM in the PM and one this morning, both of which were loose and watery.  Patient is afebrile, resting comfortably in bed and is HD stable.  No acute overnight events.    PAST MEDICAL & SURGICAL HISTORY  PAST MEDICAL & SURGICAL HISTORY:  Dyslipidemia  Hypertension  Atrial fibrillation  H/O abdominal hysterectomy  History of cholecystectomy      ALLERGIES:  penicillin (Rash)    MEDICATIONS:  STANDING MEDICATIONS  chlorhexidine 4% Liquid 1 Application(s) Topical daily  dextrose 5% + sodium chloride 0.9%. 1000 milliLiter(s) IV Continuous <Continuous>  digoxin     Tablet 0.125 milliGRAM(s) Oral daily  diltiazem    Tablet 30 milliGRAM(s) Oral every 6 hours  fidaxomicin 200 milliGRAM(s) Oral two times a day  nystatin Powder 1 Application(s) Topical two times a day  rivaroxaban 15 milliGRAM(s) Oral with dinner    PRN MEDICATIONS    VITALS:   T(F): 97.3  HR: 104  BP: 128/76  RR: 18  SpO2: --    LABS:                        11.5   6.58  )-----------( 329      ( 18 Nov 2019 15:31 )             36.1     11-18    140  |  109  |  8<L>  ----------------------------<  117<H>  3.9   |  23  |  0.7    Ca    7.2<L>      18 Nov 2019 15:31  Mg     1.4     11-18    TPro  4.9<L>  /  Alb  1.9<L>  /  TBili  0.3  /  DBili  x   /  AST  17  /  ALT  7   /  AlkPhos  79  11-18                  RADIOLOGY:  < from: CT Abdomen and Pelvis No Cont (11.04.19 @ 20:49) >  IMPRESSION:       Diffuse circumferential thickening of the colon with mild pericolonic   inflammation, compatible with pancolitis. No intraperitoneal free air or   drainable collection.    < end of copied text >  PHYSICAL EXAM:  GEN: No acute distress.  Non-toxic and HD stable. Patient resting comfortably in bed.   HEENT: NCAT  LUNGS: Clear to auscultation bilaterally   HEART: Irregular rate and rhythm.  But no murmurs.   ABD: Soft, non-tender, non-distended.  No pain upon palpation on all four quadrants. +BS on all four quadrants.   EXT: no edema.  +2 pedal pulses.    NEURO: AAOX1.      Assessment and Plan    # AMS 2/2 Septic shock secondary to C. Diff colitis  Patient had only one BM last night, and one BM in morning today. Significantly better in terms of frequency of BMs.   c/w  fidaxomicin 200mg PO BID-->End date of 11/25/2019.    After fidaxomicin is done-->will transition patient  prevous vancomycin regimen ([id number] 125 PO for 2 weeks, q12 for two weeks and q24 for two weeks)  WBC within normal limits today at 5.51 today.   GI PCR negative, C diff positive, stool cx negative   Patient tolerating Full Liquid Diet.  However her albumin is 1.7, indicating severe malnourishment-->Corrected Ca is 8.8.  If her PO intake continues to be poor, will consult nutrition services.      c/w D5W NS at 75cc/hour     # LARS likely prerenal secondary to septic shock, Resolved   LARS has completely resolved  Patient's SCr is back to baseline (.7).   Will continue to monitor CMP     #Hypoalbuminemia   Likely attributable to patient's poor PO intake.    Patient's albumin was 2.9 at admission and is now 1.7.  Likely due to malabsorption as patient has active C.Diff colitis  Will continue to monitor patient's PO intake and to encourage PO intake.  She is tolerating PO full liquids with decrease in BM frequency.     # Mild elevation in troponin likely type II demand ischemia, stable   - asymptomatic   - EKG shows afib  - echo unremarkable    # Atrial fibrillation  - Patient started on 120mg of Cardizem qdaily   - Xarelto increased to 15mg PO daily as patient RUE DVT while on 10mg PO daily of xarelto.   c/w digoxin .125mg PO daily.  Her HR has been trending in the 90s over the past 24 hours, with one read over 100 (104).         # HTN, resolved  -Patient previoushly on lisinopril and HCTZ  BP controlled established with cardizem 30mg PO [id number] (128/76 today).  Also for rate control for patient's A-fib.         Diet: CLD   GI ppx: N/A  DVT ppx: Xarelto  Activity: Increase as tolerated, pending PT/Rehab eval  Code status: Full Code (  ) / DNR ( X ) / DNI ( X )  Disposition: from home-->Clinically improving-->Will continue to monitor patient's BM.

## 2019-11-19 NOTE — PROGRESS NOTE ADULT - ATTENDING COMMENTS
I saw and evaluated patient  by bedside, no diarrhea, tolerating full liquid diet well.   All labs, radiology studies, VS was reviewed  I have reviewed the resident's note and agree with documented findings and  plan of care.    Cdiff diarrhea- patient responded well new tx. with  PO Fidaxomicin  200 mg po twice daily x 10 days( day 3), advanced diet to mechanical soft today,   contact isolation  Afib- better controlled rate -contiue xarelto, started on Digoxin, continue Cardizem with holding parameters  HTN-  - continue current meds tx.  Physical Deconditioning with ambulatory dysfunction- PT consult.     #Progress Note Handoff: monitor for BM's, continue PO Dificid , d/c IVF, advanced to mechanical soft diet.   Family discussion: will discuss d/c planning tomorrow with patient's family if patient is tolerating diet well.  Disposition: patient will benefit from SNF placement, since pt. needs full assistance with daily living activities

## 2019-11-20 NOTE — PROGRESS NOTE ADULT - ASSESSMENT
________________________________________________________________________________  DAILY PROGRESS NOTE:    ================== SUBJECTIVE ==================    STEPH DAI  /   89y  /  Female  /  MRN#: [id number]  Patient is a 89y old Female who presents with a chief complaint of Altered mental status (19 Nov 2019 10:31)  Currently admitted to medicine with the primary diagnosis of Colitis    HOSPITAL DAY: 16d     OVERNIGHT EVENTS: None    TODAY: Patient was seen this morning at bedside. Currently, the patient reports no complaints. No abdominal pain or diarrhea     Review of systems is otherwise negative.    =================== OBJECTIVE ===================    VITAL SIGNS: Last 24 Hours  T(C): 37 (20 Nov 2019 05:59), Max: 37.2 (19 Nov 2019 14:20)  T(F): 98.6 (20 Nov 2019 05:59), Max: 98.9 (19 Nov 2019 14:20)  HR: 90 (20 Nov 2019 05:59) (84 - 105)  BP: 119/76 (20 Nov 2019 05:59) (97/56 - 180/72)  BP(mean): --  RR: 18 (20 Nov 2019 05:59) (18 - 20)  SpO2: 96% (19 Nov 2019 23:05) (96% - 96%)    11-19-19 @ 07:01  -  11-20-19 @ 07:00  --------------------------------------------------------  IN: 480 mL / OUT: 0 mL / NET: 480 mL    11-20-19 @ 07:01  -  11-20-19 @ 08:39  --------------------------------------------------------  IN: 120 mL / OUT: 0 mL / NET: 120 mL      PHYSICAL EXAM:  General: NAD, AAOX2-3, patient is laying comfortably in bed, confused  HEENT: AT, NC, Supple, NO JVD, NO CB  Lungs: CTA B/L, no wheezing, no rhonchi  CVS: normal S1, S2, RRR, NO M/G/R  Abdomen: soft, bowel sounds present, non-tender, non-distended  Extremities: no edema, no clubbing, no cyanosis, positive peripheral pulses b/l  Neuro: no acute focal neurological deficits, generalized body weakness, gait not tested, chronic    ===================== LABS =====================                        11.9   4.67  )-----------( 330      ( 20 Nov 2019 07:46 )             37.0     11-19    141  |  109  |  7<L>  ----------------------------<  114<H>  3.7   |  21  |  0.7    Ca    7.0<L>      19 Nov 2019 12:25  Mg     1.4     11-19    TPro  4.6<L>  /  Alb  1.7<L>  /  TBili  0.3  /  DBili  x   /  AST  13  /  ALT  7   /  AlkPhos  72  11-19    ================== ALLERGIES ===================  penicillin (Rash)      ==================== MEDS =====================  chlorhexidine 4% Liquid 1 Application(s) Topical daily  digoxin     Tablet 0.125 milliGRAM(s) Oral daily  diltiazem    Tablet 30 milliGRAM(s) Oral every 6 hours  fidaxomicin 200 milliGRAM(s) Oral two times a day  multivitamin/minerals 1 Tablet(s) Oral daily  nystatin Powder 1 Application(s) Topical two times a day  rivaroxaban 15 milliGRAM(s) Oral with dinner    PRN MEDICATIONS      ================= ASSESSMENT/PLAN ==================    # AMS 2/2 Septic shock secondary to C. Diff colitis - Resolved   Patient having formed stools and tolerating diet   - c/w  fidaxomicin 200mg PO BID-->End date of 11/25/2019. After fidaxomicin is done-->will transition patient  previous vancomycin regimen ([id number] 125 PO for 2 weeks, q12 for two weeks and q24 for two weeks)     # LARS likely prerenal secondary to septic shock, Resolved    - Will continue to monitor CMP     # Severe malnutrition   #Hypoalbuminemia   Likely attributable to patient's poor PO intake.    Patient's albumin was 2.9 at admission and is now 1.7.  Likely due to malabsorption as patient has active C.Diff colitis  - Will continue to monitor patient's PO intake and to encourage PO intake.  She is tolerating PO full liquids with decrease in BM frequency.   - Started ensure and MVIs with diet     # Mild elevation in troponin likely type II demand ischemia, stable   - asymptomatic   - EKG shows afib  - echo unremarkable    # Atrial fibrillation  - Patient started on 30mg q6h of Cardizem qdaily and digoxin   - Xarelto increased to 15mg PO daily as patient RUE DVT while on 10mg PO daily of xarelto.      # HTN, resolved  -Patient previously on lisinopril and HCTZ now on hold since BP controlled established with cardizem 30mg PO [id number].  Also for rate control for patient's A-fib.     Diet: CLD   GI ppx: N/A  DVT ppx: Xarelto  Activity: Increase as tolerated, pending PT/Rehab eval  Code status: Full Code (  ) / DNR ( X ) / DNI ( X )  Disposition: from home-->will likely need SNF/placement on discharge ________________________________________________________________________________  DAILY PROGRESS NOTE:    ================== SUBJECTIVE ==================    STEPH DAI  /   89y  /  Female  /  MRN#: [id number]  Patient is a 89y old Female who presents with a chief complaint of Altered mental status (19 Nov 2019 10:31)  Currently admitted to medicine with the primary diagnosis of Colitis    HOSPITAL DAY: 16d     OVERNIGHT EVENTS: None    TODAY: Patient was seen this morning at bedside. Currently, the patient reports no complaints. No abdominal pain or diarrhea     Review of systems is otherwise negative.    =================== OBJECTIVE ===================    VITAL SIGNS: Last 24 Hours  T(C): 37 (20 Nov 2019 05:59), Max: 37.2 (19 Nov 2019 14:20)  T(F): 98.6 (20 Nov 2019 05:59), Max: 98.9 (19 Nov 2019 14:20)  HR: 90 (20 Nov 2019 05:59) (84 - 105)  BP: 119/76 (20 Nov 2019 05:59) (97/56 - 180/72)  BP(mean): --  RR: 18 (20 Nov 2019 05:59) (18 - 20)  SpO2: 96% (19 Nov 2019 23:05) (96% - 96%)    11-19-19 @ 07:01  -  11-20-19 @ 07:00  --------------------------------------------------------  IN: 480 mL / OUT: 0 mL / NET: 480 mL    11-20-19 @ 07:01  -  11-20-19 @ 08:39  --------------------------------------------------------  IN: 120 mL / OUT: 0 mL / NET: 120 mL      PHYSICAL EXAM:  General: NAD, AAOX2-3, patient is laying comfortably in bed, confused  HEENT: AT, NC, Supple, NO JVD, NO CB  Lungs: CTA B/L, no wheezing, no rhonchi  CVS: normal S1, S2, RRR, NO M/G/R  Abdomen: soft, bowel sounds present, non-tender, non-distended  Extremities: no edema, no clubbing, no cyanosis, positive peripheral pulses b/l  Neuro: no acute focal neurological deficits, generalized body weakness, gait not tested, chronic    ===================== LABS =====================                        11.9   4.67  )-----------( 330      ( 20 Nov 2019 07:46 )             37.0     11-19    141  |  109  |  7<L>  ----------------------------<  114<H>  3.7   |  21  |  0.7    Ca    7.0<L>      19 Nov 2019 12:25  Mg     1.4     11-19    TPro  4.6<L>  /  Alb  1.7<L>  /  TBili  0.3  /  DBili  x   /  AST  13  /  ALT  7   /  AlkPhos  72  11-19    ================== ALLERGIES ===================  penicillin (Rash)      ==================== MEDS =====================  chlorhexidine 4% Liquid 1 Application(s) Topical daily  digoxin     Tablet 0.125 milliGRAM(s) Oral daily  diltiazem    Tablet 30 milliGRAM(s) Oral every 6 hours  fidaxomicin 200 milliGRAM(s) Oral two times a day  multivitamin/minerals 1 Tablet(s) Oral daily  nystatin Powder 1 Application(s) Topical two times a day  rivaroxaban 15 milliGRAM(s) Oral with dinner    PRN MEDICATIONS      ================= ASSESSMENT/PLAN ==================    # AMS 2/2 Septic shock secondary to C. Diff colitis - Resolved   Patient having formed stools and tolerating diet   - c/w  fidaxomicin 200mg PO BID-->End date of 11/25/2019. After fidaxomicin is done-->will transition patient  previous vancomycin regimen ([id number] 125 PO for 2 weeks, q12 for two weeks and q24 for two weeks)     # LARS likely prerenal secondary to septic shock, Resolved    - Will continue to monitor CMP     # Severe malnutrition   #Hypoalbuminemia   Likely attributable to patient's poor PO intake.    Patient's albumin was 2.9 at admission and is now 1.7.  Likely due to malabsorption as patient has active C.Diff colitis  - Will continue to monitor patient's PO intake and to encourage PO intake.  She is tolerating PO full liquids with decrease in BM frequency.   - Started ensure and MVIs with diet     # Mild elevation in troponin likely type II demand ischemia, stable   - asymptomatic   - EKG shows afib  - echo unremarkable    # Atrial fibrillation  - Patient started on 30mg q6h of Cardizem qdaily and digoxin   - ??Xarelto increased to 15mg PO daily as patient RUE DVT while on 10mg PO daily of xarelto??  - Will confirm dosing     # HTN, resolved  -Patient previously on lisinopril and HCTZ now on hold since BP controlled established with cardizem 30mg PO [id number].  Also for rate control for patient's A-fib.     Diet: CLD   GI ppx: N/A  DVT ppx: Xarelto  Activity: Increase as tolerated, pending PT/Rehab eval  Code status: Full Code (  ) / DNR ( X ) / DNI ( X )  Disposition: from home-->will likely need SNF/placement on discharge ________________________________________________________________________________  DAILY PROGRESS NOTE:    ================== SUBJECTIVE ==================    STEPH DAI  /   89y  /  Female  /  MRN#: [id number]  Patient is a 89y old Female who presents with a chief complaint of Altered mental status (19 Nov 2019 10:31)  Currently admitted to medicine with the primary diagnosis of Colitis    HOSPITAL DAY: 16d     OVERNIGHT EVENTS: None    TODAY: Patient was seen this morning at bedside. Currently, the patient reports no complaints. No abdominal pain or diarrhea     Review of systems is otherwise negative.    =================== OBJECTIVE ===================    VITAL SIGNS: Last 24 Hours  T(C): 37 (20 Nov 2019 05:59), Max: 37.2 (19 Nov 2019 14:20)  T(F): 98.6 (20 Nov 2019 05:59), Max: 98.9 (19 Nov 2019 14:20)  HR: 90 (20 Nov 2019 05:59) (84 - 105)  BP: 119/76 (20 Nov 2019 05:59) (97/56 - 180/72)  BP(mean): --  RR: 18 (20 Nov 2019 05:59) (18 - 20)  SpO2: 96% (19 Nov 2019 23:05) (96% - 96%)    11-19-19 @ 07:01  -  11-20-19 @ 07:00  --------------------------------------------------------  IN: 480 mL / OUT: 0 mL / NET: 480 mL    11-20-19 @ 07:01  -  11-20-19 @ 08:39  --------------------------------------------------------  IN: 120 mL / OUT: 0 mL / NET: 120 mL      PHYSICAL EXAM:  General: NAD, AAOX2-3, patient is laying comfortably in bed, confused  HEENT: AT, NC, Supple, NO JVD, NO CB  Lungs: CTA B/L, no wheezing, no rhonchi  CVS: normal S1, S2, RRR, NO M/G/R  Abdomen: soft, bowel sounds present, non-tender, non-distended  Extremities: no edema, no clubbing, no cyanosis, positive peripheral pulses b/l  Neuro: no acute focal neurological deficits, generalized body weakness, gait not tested, chronic    ===================== LABS =====================                        11.9   4.67  )-----------( 330      ( 20 Nov 2019 07:46 )             37.0     11-19    141  |  109  |  7<L>  ----------------------------<  114<H>  3.7   |  21  |  0.7    Ca    7.0<L>      19 Nov 2019 12:25  Mg     1.4     11-19    TPro  4.6<L>  /  Alb  1.7<L>  /  TBili  0.3  /  DBili  x   /  AST  13  /  ALT  7   /  AlkPhos  72  11-19    ================== ALLERGIES ===================  penicillin (Rash)      ==================== MEDS =====================  chlorhexidine 4% Liquid 1 Application(s) Topical daily  digoxin     Tablet 0.125 milliGRAM(s) Oral daily  diltiazem    Tablet 30 milliGRAM(s) Oral every 6 hours  fidaxomicin 200 milliGRAM(s) Oral two times a day  multivitamin/minerals 1 Tablet(s) Oral daily  nystatin Powder 1 Application(s) Topical two times a day  rivaroxaban 15 milliGRAM(s) Oral with dinner    PRN MEDICATIONS      ================= ASSESSMENT/PLAN ==================    # AMS 2/2 Septic shock secondary to C. Diff colitis - Resolved   Patient having formed stools and tolerating diet   - po Fidaxomicin 200 mg q12h for 10 days and then change to po vancomycin 125 mg q6h for 2 weeks then q12h for 2 more weeks, then 125 mg q24h for 2 more weeks    # LARS likely prerenal secondary to septic shock, Resolved    - Will continue to monitor CMP     # Severe malnutrition   #Hypoalbuminemia   Likely attributable to patient's poor PO intake.    Patient's albumin was 2.9 at admission and is now 1.7.  Likely due to malabsorption as patient has active C.Diff colitis  - Will continue to monitor patient's PO intake and to encourage PO intake.  She is tolerating PO full liquids with decrease in BM frequency.   - Started ensure and MVIs with diet     # Mild elevation in troponin likely type II demand ischemia, stable   - asymptomatic   - EKG shows afib  - echo unremarkable    # Atrial fibrillation  - Patient started on 30mg q6h of Cardizem qdaily and digoxin   - ??Xarelto increased to 15mg PO daily as patient RUE DVT while on 10mg PO daily of xarelto??  - Will confirm dosing     # HTN, resolved  -Patient previously on lisinopril and HCTZ now on hold since BP controlled established with cardizem 30mg PO [id number].  Also for rate control for patient's A-fib.     Diet: CLD   GI ppx: N/A  DVT ppx: Xarelto  Activity: Increase as tolerated, pending PT/Rehab eval  Code status: Full Code (  ) / DNR ( X ) / DNI ( X )  Disposition: from home-->will likely need SNF/placement on discharge

## 2019-11-20 NOTE — PROGRESS NOTE ADULT - ATTENDING COMMENTS
# AMS 2/2 Septic shock secondary to C. Diff colitis - Resolved   Patient having formed stools and tolerating diet   - po Fidaxomicin 200 mg q12h for 10 days and then change to po vancomycin 125 mg q6h for 2 weeks then q12h for 2 more weeks, then 125 mg q24h for 2 more weeks    # LARS likely prerenal secondary to septic shock, Resolved    - Will continue to monitor CMP     # Severe malnutrition   #Hypoalbuminemia   Likely attributable to patient's poor PO intake.    Patient's albumin was 2.9 at admission and is now 1.7.  Likely due to malabsorption as patient has active C.Diff colitis  - Will continue to monitor patient's PO intake and to encourage PO intake.  She is tolerating PO full liquids with decrease in BM frequency.   - Started ensure and MVIs with diet     # Mild elevation in troponin likely type II demand ischemia, stable   - asymptomatic   - EKG shows afib  - echo unremarkable    # Atrial fibrillation  - change to 120mg Long acting cardizem from 30mg q6h of Cardizem; and digoxin   - Patient has been on 10mg daily which is not the right dose.   Now dose increased to full therapeutic since she has the RUE and subclavian DVT. 15mg BID x 21 days. Then 20 mg QD.       # HTN, resolved  -Patient previously on lisinopril and HCTZ now on hold since BP controlled established with cardizem 30mg PO [id number].  Also for rate control for patient's A-fib.     Diet: CLD   GI ppx: N/A  DVT ppx: Xarelto  Activity: Increase as tolerated, pending PT/Rehab eval  Code status: Full Code (  ) / DNR ( X ) / DNI ( X )  Disposition: from home-->will likely need SNF/placement on discharge

## 2019-11-21 NOTE — PROGRESS NOTE ADULT - SUBJECTIVE AND OBJECTIVE BOX
SUBJECTIVE:    Patient is a 89y old  Female who presents with a chief complaint of Altered mental status (20 Nov 2019 08:39)    Currently admitted to medicine with the primary diagnosis of Colitis     Today is hospital day 17d.     Patient was seen at bedside this AM.  As per PCA,  patient had one loose and watery BM last night.  Drastic improvement in terms of frequency of BM.   However she has not had fully formed stools in a     PAST MEDICAL & SURGICAL HISTORY  PAST MEDICAL & SURGICAL HISTORY:  Dyslipidemia  Hypertension  Atrial fibrillation  H/O abdominal hysterectomy  History of cholecystectomy      ALLERGIES:  penicillin (Rash)    MEDICATIONS:  STANDING MEDICATIONS  chlorhexidine 4% Liquid 1 Application(s) Topical daily  digoxin     Tablet 0.125 milliGRAM(s) Oral daily  diltiazem    Tablet 30 milliGRAM(s) Oral every 6 hours  fidaxomicin 200 milliGRAM(s) Oral two times a day  multivitamin/minerals 1 Tablet(s) Oral daily  nystatin Powder 1 Application(s) Topical two times a day  rivaroxaban 15 milliGRAM(s) Oral two times a day with meals    PRN MEDICATIONS    VITALS:   T(F): 96.1  HR: 53  BP: 99/70  RR: 17  SpO2: 97%    LABS:                        11.9   4.67  )-----------( 330      ( 20 Nov 2019 07:46 )             37.0     11-20    142  |  109  |  8<L>  ----------------------------<  93  3.8   |  25  |  0.8    Ca    7.4<L>      20 Nov 2019 07:46  Mg     1.7     11-20    TPro  5.0<L>  /  Alb  1.7<L>  /  TBili  0.4  /  DBili  x   /  AST  14  /  ALT  7   /  AlkPhos  82  11-20                  RADIOLOGY:    PHYSICAL EXAM:  GEN: No acute distress  HEENT: NCAT  LUNGS: Clear to auscultation bilaterally   HEART: RRR. no murmurs  ABD: Soft, non-tender, non-distended  EXT: no edema  NEURO: AAOX3    Assessment and Plan: SUBJECTIVE:    Patient is a 89y old  Female who presents with a chief complaint of Altered mental status (20 Nov 2019 08:39)    Currently admitted to medicine with the primary diagnosis of Colitis     Today is hospital day 17d.     Patient was seen at bedside this AM.  As per PCA,  patient had one loose and watery BM last night.  Drastic improvement in terms of frequency of BM.   However she has not had fully formed stools during the course of her hospital. She denies any acute symptoms today and is resting comfortably in bed.     PAST MEDICAL & SURGICAL HISTORY  PAST MEDICAL & SURGICAL HISTORY:  Dyslipidemia  Hypertension  Atrial fibrillation  H/O abdominal hysterectomy  History of cholecystectomy      ALLERGIES:  penicillin (Rash)    MEDICATIONS:  STANDING MEDICATIONS  chlorhexidine 4% Liquid 1 Application(s) Topical daily  digoxin     Tablet 0.125 milliGRAM(s) Oral daily  diltiazem    Tablet 30 milliGRAM(s) Oral every 6 hours  fidaxomicin 200 milliGRAM(s) Oral two times a day  multivitamin/minerals 1 Tablet(s) Oral daily  nystatin Powder 1 Application(s) Topical two times a day  rivaroxaban 15 milliGRAM(s) Oral two times a day with meals    PRN MEDICATIONS    VITALS:   T(F): 96.1  HR: 53  BP: 99/70  RR: 17  SpO2: 97%    LABS:                        11.9   4.67  )-----------( 330      ( 20 Nov 2019 07:46 )             37.0     11-20    142  |  109  |  8<L>  ----------------------------<  93  3.8   |  25  |  0.8    Ca    7.4<L>      20 Nov 2019 07:46  Mg     1.7     11-20    TPro  5.0<L>  /  Alb  1.7<L>  /  TBili  0.4  /  DBili  x   /  AST  14  /  ALT  7   /  AlkPhos  82  11-20                RADIOLOGY:  < from: CT Abdomen and Pelvis No Cont (11.04.19 @ 20:49) >  IMPRESSION:       Diffuse circumferential thickening of the colon with mild pericolonic   inflammation, compatible with pancolitis. No intraperitoneal free air or   drainable collection.    < end of copied text >  PHYSICAL EXAM:  GEN: No acute distress.  Non-toxic and HD stable. Patient resting comfortably in bed.   HEENT: NCAT  LUNGS: Clear to auscultation bilaterally   HEART: Irregular rate and rhythm.  But no murmurs.   ABD: Soft, non-tender, non-distended.  No pain upon palpation on all four quadrants. +BS on all four quadrants.   EXT: no edema.  +2 pedal pulses.    NEURO: AAOX1.      Assessment and Plan    # AMS 2/2 Septic shock secondary to C. Diff colitis  Patient had only one BM last night, that was loose and watery.  But No BM in the AM.   c/w  fidaxomicin 200mg PO BID-->End date of 11/26/2019.    After fidaxomicin is done-->will transition patient  prevous vancomycin regimen ([id number] 125 PO for 2 weeks, q12 for two weeks and q24 for two weeks)  WBC within normal limits today at 4.61 as of 11/19/2019.  GI PCR negative, C diff positive, stool cx negative   Patient tolerating DASH diet today       # LARS likely prerenal secondary to septic shock, Resolved   LARS has completely resolved  Patient's SCr is back to baseline (.7).       #Hypoalbuminemia   Likely attributable to patient's poor PO intake.    Patient's albumin was 2.9 at admission and is now 1.7 two days ago  Patient started on ensure as well.     # Mild elevation in troponin likely type II demand ischemia, stable   - asymptomatic   - EKG shows afib  - echo unremarkable    # Atrial fibrillation  Rate controlled established with digoxin .125mg PO daily (HR in 80s)  - Patient started on 120mg of Cardizem qdaily   - Xarelto frequency increased to 15mg PO bid twice a day for 21 days-->Will transition to 20mg PO daily there after as this is guidelines in terms treating DVT/PE. Patient's stay complicated by RUE DVT.       # HTN, resolved  -Patient previoushly on lisinopril 20mg PO daily, HCTZ 12.5 PO daily, and metoprolol 50mg PO BID-->Was not treated with cardizem as of 1/2019 (talked to her PCP.)  BP controlled established with cardizem 30mg PO q1yhelq (128/76 today).  Holding other home BP meds for now.         Diet: DASH  GI ppx: N/A  DVT ppx: Xarelto  Activity: Increase as tolerated, pending PT/Rehab eval  Code status: Full Code (  ) / DNR ( X ) / DNI ( X )  Disposition: from home-->Clinically improving-->Will continue to monitor patient's BM.

## 2019-11-21 NOTE — PROGRESS NOTE ADULT - ATTENDING COMMENTS
# AMS 2/2 Septic shock secondary to C. Diff colitis - Resolved   Patient having formed stools and tolerating diet   - po Fidaxomicin 200 mg q12h for 10 days and then change to po vancomycin 125 mg q6h for 2 weeks then q12h for 2 more weeks, then 125 mg q24h for 2 more weeks    # LARS likely prerenal secondary to septic shock, Resolved    - Will continue to monitor CMP     # Severe malnutrition   #Hypoalbuminemia   Likely attributable to patient's poor PO intake.    Patient's albumin was 2.9 at admission and is now 1.7.  Likely due to malabsorption as patient has active C.Diff colitis  - Will continue to monitor patient's PO intake and to encourage PO intake.  She is tolerating PO full liquids with decrease in BM frequency.   - Started ensure and MVIs with diet     # Mild elevation in troponin likely type II demand ischemia, stable   - asymptomatic   - EKG shows afib  - echo unremarkable    # Atrial fibrillation  - Apparently patient has only been on metoprolol for this. d/c diltiazem.     xarelto  dose increased to full therapeutic since she has the RUE and subclavian DVT. 15mg BID x 21 days. Then 20 mg QD.       # HTN, resolved  -Patient previously on lisinopril and HCTZ now on hold    Diet: CLD   GI ppx: N/A  DVT ppx: Xarelto  Activity: Increase as tolerated, pending PT/Rehab eval  Code status: Full Code (  ) / DNR ( X ) / DNI ( X )  Disposition: from home-->will likely need SNF/placement on discharge . discuss with daughter.

## 2019-11-21 NOTE — CHART NOTE - NSCHARTNOTEFT_GEN_A_CORE
Registered Dietitian Follow-Up     Patient Profile Reviewed                           Yes [x]   No []     Nutrition History Previously Obtained        Yes []  No [x] pt asleep and unable to arouse upon this assessment, unable to reach emergency contact and no family present at bedside     Pertinent Subjective Information: Pt asleep OOB in chair- opens eyes when name is called but does not maintain arousal. Per EMR, po intake have improved since previous assessment, and po intake has been recorded at 50-75%. Pt with c diff still have loose BMs, but less frequency. Last BM was last night.      Pertinent Medical Interventions: GI PCR negative, c. diff positive, stool cx negative. LARS completely resolve, Cr back to baseline per EMR. Pt clinically improving, continuing to monitor BMs.      Diet order: DASH/TLC; dysphagia 2 mechanical soft, thin liquids; Ensure Enlive BID     Anthropometrics:  - Ht. 61"  - Wt. 50 kg/110 lbs (no new wt since previous assessment)  - %wt change NA  - BMI 20.8  - IBW 47.7 kg/105 lbs     Pertinent Lab Data: (11/20) RBC 4.02, Hgb 11.9, BUN 8, Mg 1.7     Pertinent Meds: fidaxomicin, MVI w/ minerals     Physical Findings:  - Appearance: well nourished; +2 edema to the R arm (11/20)  - GI function: c diff colitis (+); still having loose BMs, but frequency has improved per EMR  - Tubes: NA  - Oral/Mouth cavity: on dysphagia 2 diet   - Skin: b/l buttocks stage II, healing per EMR     Nutrition Requirements  Weight Used: 50kg, needs cont from RD assessment 11/7     Energy: 6771-5623 kcal/day (30-35 kcal/kg ABW)    Protein: 63-75 g/day (1.25-1.5 g/kg ABW)    Fluids: 1 mL/kcal     Nutrient Intake: 50-75% (good)        [x] Previous Nutrition Diagnosis:  Increased Nutrient Needs            [x] Ongoing          [] Resolved    [x] Previous Nutrition Diagnosis:  Inadequate protein/energy intake (improved)            [x] Ongoing             Nutrition Intervention meals and snacks, coordination of care     Goal/Expected Outcome: pt to meet >75% estimated energy requirements over the next 4 days     Indicator/Monitoring: RD to monitor diet order, energy intake, body composition, NFPF, renal/electrolyte profile    Recommendation: continue current diet order + supplements; encourage po intake; provide assistance with meals PRN; see within 4 days to ensure regular, solid BMs + consistent good po intake

## 2019-11-22 NOTE — PROGRESS NOTE ADULT - SUBJECTIVE AND OBJECTIVE BOX
SUBJECTIVE:    Patient is a 89y old  Female who presents with a chief complaint of Altered mental status (21 Nov 2019 10:18)    Currently admitted to medicine with the primary diagnosis of Colitis     Today is hospital day 18d.     Patient was seen at bedside this AM. As per nurse, she only had one BM yesterday morning that was loose and watery.  No acute overnight events and the patient is resting in her bed without symptoms or complaints.     PAST MEDICAL & SURGICAL HISTORY  PAST MEDICAL & SURGICAL HISTORY:  Dyslipidemia  Hypertension  Atrial fibrillation  H/O abdominal hysterectomy  History of cholecystectomy      ALLERGIES:  penicillin (Rash)    MEDICATIONS:  STANDING MEDICATIONS  chlorhexidine 4% Liquid 1 Application(s) Topical daily  digoxin     Tablet 0.125 milliGRAM(s) Oral daily  diltiazem    Tablet 30 milliGRAM(s) Oral every 6 hours  fidaxomicin 200 milliGRAM(s) Oral two times a day  multivitamin/minerals 1 Tablet(s) Oral daily  nystatin Powder 1 Application(s) Topical two times a day  rivaroxaban 15 milliGRAM(s) Oral two times a day with meals    PRN MEDICATIONS    VITALS:   T(F): 96.7  HR: 95  BP: 120/70  RR: 17  SpO2: --    LABS:                      RADIOLOGY:  < from: CT Abdomen and Pelvis No Cont (11.04.19 @ 20:49) >  IMPRESSION:       Diffuse circumferential thickening of the colon with mild pericolonic   inflammation, compatible with pancolitis. No intraperitoneal free air or   drainable collection.    < end of copied text >  PHYSICAL EXAM:  GEN: No acute distress.  Non-toxic and HD stable. Patient resting comfortably in bed.   HEENT: NCAT  LUNGS: Clear to auscultation bilaterally   HEART: Irregular rate and rhythm.  But no murmurs.   ABD: Soft, non-tender, non-distended.  No pain upon palpation on all four quadrants. +BS on all four quadrants.   EXT: no edema.  +2 pedal pulses.  RUE swelling without erythema but is tender.  Same findings for the RLE as well.   NEURO: AAOX1.      Assessment and Plan    # AMS 2/2 Septic shock secondary to C. Diff colitis  Patient had only BM that was loose and watery over the past 21 hours (patient needs to go 48 hours without having such a BM in order to be considered non-contagious).  c/w  fidaxomicin 200mg PO BID-->End date of 11/26/2019.    After fidaxomicin is done-->will transition patient  prevous vancomycin regimen (f4gbshd 125 PO for 2 weeks, q12 for two weeks and q24 for two weeks)  WBC within normal limits today at 4.67.  Discusssed with attending-->Patient no longer needs blood draws as she is clinically stable and her condition has significantly improved.   GI PCR negative, C diff positive, stool cx negative   Patient tolerating DASH diet today       # LARS likely prerenal secondary to septic shock, Resolved   Patient's SCr is back to baseline (.7). As of two days ago.       #Hypoalbuminemia   Likely attributable to patient's poor PO intake.    c/w DASH diet w/ensure-->PO intake encouraged.     # Mild elevation in troponin likely type II demand ischemia, stable   - asymptomatic   - EKG shows afib  - echo unremarkable    # Atrial fibrillation. Rate controlled with Digoxin and Cardizem.   Rate controlled established with digoxin .125mg PO daily (HR in 80s)  - Patient was not switched to 120mg PO daily due to potential BP intolerance.  Will opt for continuing 30mg PO cardizem [id number].   - c/w xarelto 15mg PO bid twice a day for 21 days-->Will transition to 20mg PO daily there after as this is guidelines in terms treating DVT/PE. Patient's stay complicated by RUE DVT.       # HTN, resolved  -Patient previoushly on lisinopril 20mg PO daily, HCTZ 12.5 PO daily, and metoprolol 50mg PO BID-->Was not treated with cardizem as of 1/2019 (talked to her PCP.)  BP controlled established with cardizem 30mg PO [id number] (120/70) today. Patient will be discharged with only cardizem and dig.          Diet: DASH  GI ppx: N/A  DVT ppx: Xarelto  Activity: Increase as tolerated, pending PT/Rehab eval  Code status: Full Code (  ) / DNR ( X ) / DNI ( X )  Disposition: from home-->Clinically improving-->Will continue to monitor patient's BM. Spoke to patient's daughter, Emmy.  Adamantly refuses for patient to go to SNF and confirms that patient refuses SNF as well from prior interaction.  She will be going back to her senior home in 24-48 hours.

## 2019-11-22 NOTE — PROGRESS NOTE ADULT - ATTENDING COMMENTS
# AMS 2/2 Septic shock secondary to C. Diff colitis - Resolved   Patient having formed stools and tolerating diet   - po Fidaxomicin 200 mg q12h for 10 days and then change to po vancomycin 125 mg q6h for 2 weeks then q12h for 2 more weeks, then 125 mg q24h for 2 more weeks  Last loose BM 11/21.  will be considered non-contagious after tomorrow .    # LARS likely prerenal secondary to septic shock, Resolved    - Will continue to monitor CMP     # Severe malnutrition   #Hypoalbuminemia   Likely attributable to patient's poor PO intake.    Patient's albumin was 2.9 at admission and is now 1.7.  Likely due to malabsorption as patient has active C.Diff colitis  - Will continue to monitor patient's PO intake and to encourage PO intake.  She is tolerating PO full liquids with decrease in BM frequency.   - Started ensure and MVIs with diet     # Mild elevation in troponin likely type II demand ischemia, stable   - asymptomatic   - EKG shows afib  - echo unremarkable    # Atrial fibrillation  - Apparently patient has only been on metoprolol for this. d/c diltiazem.     xarelto  dose increased to full therapeutic since she has the RUE and subclavian DVT. 15mg BID x 21 days. Then 20 mg QD.       # HTN, resolved  -Patient previously on lisinopril and HCTZ now on hold    Diet: CLD   GI ppx: N/A  DVT ppx: Xarelto  Activity: Increase as tolerated, pending PT/Rehab eval  Code status: Full Code (  ) / DNR ( X ) / DNI ( X )  Disposition: from home-->will likely need SNF/placement on discharge . discuss with daughter.    PT to re-evaluateto see if she can go home possibly with home PT. able to walk 50 feet. # AMS 2/2 Septic shock secondary to C. Diff colitis - Resolved   Patient having formed stools and tolerating diet   - po Fidaxomicin 200 mg q12h for 10 days and then change to po vancomycin 125 mg q6h for 2 weeks then q12h for 2 more weeks, then 125 mg q24h for 2 more weeks  Last loose BM 11/21.  will be considered non-contagious after tomorrow .    # LARS likely prerenal secondary to septic shock, Resolved    - Will continue to monitor CMP     # Severe malnutrition   #Hypoalbuminemia   Likely attributable to patient's poor PO intake.    Patient's albumin was 2.9 at admission and is now 1.7.  Likely due to malabsorption as patient has active C.Diff colitis  - Will continue to monitor patient's PO intake and to encourage PO intake.  She is tolerating PO full liquids with decrease in BM frequency.   - Started ensure and MVIs with diet     # Mild elevation in troponin likely type II demand ischemia, stable   - asymptomatic   - EKG shows afib  - echo unremarkable    # Atrial fibrillation  - Apparently patient has only been on metoprolol for this. d/c diltiazem.     xarelto  dose increased to full therapeutic since she has the RUE and subclavian DVT. 15mg BID x 21 days. Then 20 mg QD.       # HTN, resolved  -Patient previously on lisinopril and HCTZ now on hold    #Tachypnea on inspection:  Check CXR to r/o PNA.     Diet: CLD   GI ppx: N/A  DVT ppx: Xarelto  Activity: Increase as tolerated, pending PT/Rehab eval  Code status: Full Code (  ) / DNR ( X ) / DNI ( X )  Disposition: from home-->will likely need SNF/placement on discharge . discuss with daughter.    PT to re-evaluateto see if she can go home possibly with home PT. able to walk 50 feet.

## 2019-11-23 NOTE — DISCHARGE NOTE PROVIDER - HOSPITAL COURSE
89 year old lady DNI/DNR (per previous records), with history of afib, hypertension and DLD was brought in for altered mental status as per ED note, the aid reported decreased appetite and frequent, watery bowel movements. the patient denies abdominal pain, fever, dyspnea, headache I called the emergency contact, the daughter in law reported that there is no relationship with the patient and that her daughter and granddaughter usually visit her. she lives in a senior home and has aid that visits her regularly. I tried calling the daughter, no answer, left a voice message to note that the patient was recently admitted, sep 2019, for severe sepsis 2/2 colitis. in the ED she was hypotensive 74/41 and tachycardic, no documented fever WBC=43K, lac acid=3.1. CT showed pan colitis.          Patient was admitted to the floors with a working diagnosis of AMS 2/2 septic shock 2/2 C. Diff colitis. Patient was required levophed to treat her hypotension.  BP stabilized and levophed was weaned.  Patient was placed on PO vanc and IV flagyl.  IV flagyl was discontinued and patient received monotherapy with vanc. Patient was continuously having bowel movements despite receiving PO vancomycin. Patient was made NPO except meds for bowel rest and was placed on IVF.  ID recommended patient be placed on fidaxomicin 200mg PO bid for 10 days  and patient is to be transitioned to PO vanc 125 q6 for 2 weeks, q12, for 2 weeks and q24 for 2 weeks. While on fidaxomicin, frequency of watery bowel movements decreased significantly.  Today she is non-contagious and is stable for discharge today. 89 year old lady DNI/DNR (per previous records), with history of afib, hypertension and DLD was brought in for altered mental status as per ED note, the aid reported decreased appetite and frequent, watery bowel movements. the patient denies abdominal pain, fever, dyspnea, headache I called the emergency contact, the daughter in law reported that there is no relationship with the patient and that her daughter and granddaughter usually visit her. she lives in a senior home and has aid that visits her regularly. I tried calling the daughter, no answer, left a voice message to note that the patient was recently admitted, sep 2019, for severe sepsis 2/2 colitis. in the ED she was hypotensive 74/41 and tachycardic, no documented fever WBC=43K, lac acid=3.1. CT showed pan colitis.          Patient was admitted to the floors with a working diagnosis of AMS 2/2 septic shock 2/2 C. Diff colitis. Patient was required levophed to treat her hypotension.  BP stabilized and levophed was weaned.  Patient was placed on PO vanc and IV flagyl.  IV flagyl was discontinued and patient received monotherapy with vanc. Patient was continuously having bowel movements despite receiving PO vancomycin. Patient was made NPO except meds for bowel rest and was placed on IVF.  ID recommended patient be placed on fidaxomicin 200mg PO bid for 10 days  and patient is to be transitioned to PO vanc 125 q6 for 2 weeks, q12, for 2 weeks and q24 for 2 weeks. While on fidaxomicin, frequency of watery bowel movements decreased significantly and consistency is now more pasty.  Today she is non-contagious and is stable for discharge today.         Daughter initially refused SNF discharge but today patient and daughter agreed for [facility] 89 year old lady DNI/DNR (per previous records), with history of afib, hypertension and DLD was brought in for altered mental status as per ED note, the aid reported decreased appetite and frequent, watery bowel movements. the patient denies abdominal pain, fever, dyspnea, headache I called the emergency contact, the daughter in law reported that there is no relationship with the patient and that her daughter and granddaughter usually visit her. she lives in a senior home and has aid that visits her regularly. I tried calling the daughter, no answer, left a voice message to note that the patient was recently admitted, sep 2019, for severe sepsis 2/2 colitis. in the ED she was hypotensive 74/41 and tachycardic, no documented fever WBC=43K, lac acid=3.1. CT showed pan colitis.          Patient was admitted to the floors with a working diagnosis of AMS 2/2 septic shock 2/2 C. Diff colitis. Patient was required levophed to treat her hypotension.  BP stabilized and levophed was weaned.  Patient was placed on PO vanc and IV flagyl.  IV flagyl was discontinued and patient received monotherapy with vanc. Patient was continuously having bowel movements despite receiving PO vancomycin. Patient was made NPO except meds for bowel rest and was placed on IVF.  ID recommended patient be placed on fidaxomicin 200mg PO bid for 10 days  and patient is to be transitioned to PO vanc 125 q6 for 2 weeks, q12, for 2 weeks and q24 for 2 weeks. While on fidaxomicin, frequency of watery bowel movements decreased significantly and consistency is now more pasty.  Today she is non-contagious and is stable for discharge today.         Daughter initially refused SNF discharge but today patient and daughter agreed [facility]            Attending Note:    Patient seen and examined independently. I personally had a face-to-face encounter with the patient, examined the patient myself and reviewed the plan of care with the housestaff. Agree with resident's note but my note supersedes that of the resident in the matters hereby listed.         #C diff colitis : nearly resolved. Still has some diarrhea remaining. Finish PO Fidaxomicin and then oral Vanco taper per med rec.     Anyone in contact with her should wash hands with soap and water prior to eating. avoid unnecessary antibiotics.         #A Fib: c/w diltiazem CD, Digoxin and Xarelto.        #Acute Rt IJ DVT: Xarelto. f/u US in 3 months.         #HTN: Just lasix, diltiazem are maintaining BP.     c/w Lasix since she tends to get fluid overloaded sometimes.         #Advanced DEmentia: sleeps most of the time. But Ox3 when awake. ambulate as tolerated.         D/c to [facility] per patient's personally expressed wishes. PAtient is competent. Daughter also agrees.

## 2019-11-23 NOTE — DISCHARGE NOTE PROVIDER - CARE PROVIDERS DIRECT ADDRESSES
[email or web address]

## 2019-11-23 NOTE — DISCHARGE NOTE PROVIDER - NSDCMRMEDTOKEN_GEN_ALL_CORE_FT
Calcium 500+D oral tablet, chewable: 1 tab(s) orally 3 times a day  digoxin 125 mcg (0.125 mg) oral tablet: 1 tab(s) orally once a day  ferrous sulfate 325 mg (65 mg elemental iron) oral tablet: 1 tab(s) orally 2 times a day  fidaxomicin 200 mg oral tablet: 1 tab(s) orally 2 times a day  metoprolol tartrate 50 mg oral tablet: 1 tab(s) orally 2 times a day   Multiple Vitamins with Minerals oral tablet: 1 tab(s) orally once a day  rivaroxaban 15 mg oral tablet: 1 tab(s) orally 2 times a day (with meals) Calcium 500+D oral tablet, chewable: 1 tab(s) orally 3 times a day  digoxin 125 mcg (0.125 mg) oral tablet: 1 tab(s) orally once a day  dilTIAZem 120 mg/24 hours oral capsule, extended release: 1 cap(s) orally once a day  ferrous sulfate 325 mg (65 mg elemental iron) oral tablet: 1 tab(s) orally 2 times a day  fidaxomicin 200 mg oral tablet: 1 tab(s) orally 2 times a day. Last day 12/8/19. Then switch to oral Vancomycin (as above) on 12/9/19  furosemide 20 mg oral tablet: 1 tab(s) orally once a day  Multiple Vitamins with Minerals oral tablet: 1 tab(s) orally once a day  rivaroxaban 15 mg oral tablet: 1 tab(s) orally 2 times a day (with meals) until 12/12/19. Then switch to 20 mg once daily on 12/13/19  vancomycin 125 mg oral capsule: 1 cap orally every 6 hours x14 days  1 cap orally every 12 hours x14 days  1 cap orally every day x14 days  vancomycin 125 mg oral capsule: 1 cap(s) orally every 12 hours   vancomycin 125 mg oral capsule: 1 cap(s) orally every 24 hours

## 2019-11-23 NOTE — PROGRESS NOTE ADULT - ASSESSMENT
# AMS 2/2 Septic shock secondary to C. Diff colitis - Resolved   Patient having formed stools and tolerating diet   - po Fidaxomicin 200 mg q12h for 10 days and then change to po vancomycin 125 mg q6h for 2 weeks then q12h for 2 more weeks, then 125 mg q24h for 2 more weeks  again had multiple loose BM on 11/23. Still considered contagious. wait for diarrhea to resolve.      # LARS likely prerenal secondary to septic shock, Resolved    - Will continue to monitor CMP     # Severe malnutrition   #Hypoalbuminemia   Likely attributable to patient's poor PO intake.    Patient's albumin was 2.9 at admission and is now 1.7.  Likely due to malabsorption as patient has active C.Diff colitis  - Will continue to monitor patient's PO intake and to encourage PO intake.  She is tolerating PO full liquids with decrease in BM frequency.   - Started ensure and MVIs with diet     # Mild elevation in troponin likely type II demand ischemia, stable   - asymptomatic   - EKG shows afib  - echo unremarkable    # Atrial fibrillation  - Apparently patient has only been on metoprolol for this. d/c diltiazem.     xarelto  dose increased to full therapeutic since she has the RUE and subclavian DVT. 15mg BID x 21 days. Then 20 mg QD.       # HTN, resolved  -Patient previously on lisinopril and HCTZ now on hold    #Tachypnea on inspection:  repeated CXRs only showing diffuse haziness at b/l bases. lungs sound clear though. Saturating fine. will continue to monitor.     Diet: CLD   GI ppx: N/A  DVT ppx: Xarelto  Activity: Increase as tolerated, pending PT/Rehab eval  Code status: Full Code (  ) / DNR ( X ) / DNI ( X )  Disposition: from home-->will likely need SNF/placement on discharge . discuss with daughter.    PT to re-evaluateto see if she can go home possibly with home PT. able to walk 50 feet.

## 2019-11-23 NOTE — DISCHARGE NOTE PROVIDER - INSTRUCTIONS
Dysphagia 1 diet: pureed with thin liquids  Low sodium and low cholesterol diet  Ensure Enlive supplement twice daily

## 2019-11-23 NOTE — DISCHARGE NOTE PROVIDER - CARE PROVIDER_API CALL
Malik Santiago)  [address] [address]  [address] [facility], [address]  [place] [address]  Phone: [phone]  Fax: [phone]  Follow Up Time: 1 week

## 2019-11-23 NOTE — DISCHARGE NOTE PROVIDER - NSDCCPCAREPLAN_GEN_ALL_CORE_FT
PRINCIPAL DISCHARGE DIAGNOSIS  Diagnosis: Clostridium difficile colitis  Assessment and Plan of Treatment: You were admitted because you had an infection of your colon caused by C.Diff.  C.diff is a bacteria that normally lives in your gut, but if it over grows, then there is potential for infection.  You were treated with IV and oral antibiotics.  Your blood pressure was very low due to the infection and you received medication that helped raised your blood pressure. You are to be discharged to complete your 10 day course of fidaxomicin and a 6 week course of vancomycin, please take these medications exactly as prescribed and follow up with your primary care doctor.  Report to the ED if your abdomen is hard and swollen, if you vomit blood or if you have bloody diarrhea, dizziness, weakness, not able to urinate.      SECONDARY DISCHARGE DIAGNOSES  Diagnosis: Atrial fibrillation  Assessment and Plan of Treatment: Atrial fibrillation is when the top two chambers of your heart quiver really fast, which can lead to clot formation and this can put you at risk for stroke.  You are going to be discharged with a course of xarelto that is going to be 15mg twice a day (this regimen ends on 12/12/2019).  On 12/13/2019, please take 20mg xarelto by mouth once a day.  You were started on a new medication, digoxin, which helps with your heart rate.  If you experience symptoms of facial weakness, paralysis, lower or upper extremity weakness, call 911 and report to ED immedately. PRINCIPAL DISCHARGE DIAGNOSIS  Diagnosis: Clostridium difficile colitis  Assessment and Plan of Treatment: You were admitted because you had an infection of your colon caused by C.Diff.  C.diff is a bacteria that normally lives in your gut, but if it over grows, then there is potential for infection.  You were treated with IV and oral antibiotics.  Your blood pressure was very low due to the infection and you received medication that helped raised your blood pressure. You are to be discharged to complete your 10 day course of fidaxomicin (last day 12/8/19) and a 6 week course of vancomycin, please take these medications exactly as prescribed and follow up with your primary care doctor.  Report to the ED if your abdomen is hard and swollen, if you vomit blood or if you have bloody diarrhea, dizziness, weakness, not able to urinate.      SECONDARY DISCHARGE DIAGNOSES  Diagnosis: Atrial fibrillation  Assessment and Plan of Treatment: Atrial fibrillation is when the top two chambers of your heart quiver really fast, which can lead to clot formation and this can put you at risk for stroke.  You are going to be discharged with a course of xarelto that is going to be 15mg twice a day (this regimen ends on 12/12/2019).  On 12/13/2019, please take 20mg xarelto by mouth once a day.  You were started on a new medication, digoxin, which helps with your heart rate.  If you experience symptoms of facial weakness, paralysis, lower or upper extremity weakness, call 911 and report to ED immedately.    Diagnosis: Acute deep vein thrombosis (DVT)  Assessment and Plan of Treatment: You were found to have an acute blood clot on your right internal jugular and subclavian veins. You have been started on Xarelto as explained above. You need to continue the above explained regimen and follow up with your primary doctor for a repeat sonogram to evaluate the clots in 3 months PRINCIPAL DISCHARGE DIAGNOSIS  Diagnosis: Clostridium difficile colitis  Assessment and Plan of Treatment: You were admitted because you had an infection of your colon caused by C.Diff.  C.diff is a bacteria that normally lives in your gut, but if it over grows, then there is potential for infection.  You were treated with IV and oral antibiotics.  Your blood pressure was very low due to the infection and you received medication that helped raised your blood pressure. You are to be discharged to complete your 10 day course of fidaxomicin (last day 12/8/19) and a 6 week course of vancomycin, please take these medications exactly as prescribed and follow up with your primary care doctor.  Report to the ED if your abdomen is hard and swollen, if you vomit blood or if you have bloody diarrhea, dizziness, weakness, not able to urinate.      SECONDARY DISCHARGE DIAGNOSES  Diagnosis: Acute deep vein thrombosis (DVT)  Assessment and Plan of Treatment: You were found to have an acute blood clot on your right internal jugular and subclavian veins. You have been started on Xarelto as explained above. You need to continue the above explained regimen and follow up with your primary doctor for a repeat sonogram to evaluate the clots in 3 months and decide on the duration of anticoagulation    Diagnosis: Atrial fibrillation  Assessment and Plan of Treatment: Atrial fibrillation is when the top two chambers of your heart quiver really fast, which can lead to clot formation and this can put you at risk for stroke.  You are going to be discharged with a course of xarelto that is going to be 15mg twice a day (this regimen ends on 12/12/2019).  On 12/13/2019, please take 20mg xarelto by mouth once a day.  You were started on a new medication, digoxin, which helps with your heart rate.  If you experience symptoms of facial weakness, paralysis, lower or upper extremity weakness, call 911 and report to ED immedately. PRINCIPAL DISCHARGE DIAGNOSIS  Diagnosis: Clostridium difficile colitis  Assessment and Plan of Treatment: You were admitted because you had an infection of your colon caused by C.Diff.  C.diff is a bacteria that normally lives in your gut, but if it over grows, then there is potential for infection.  You were treated with IV and oral antibiotics.  Your blood pressure was very low due to the infection and you received medication that helped raised your blood pressure. You are to be discharged to complete your 10 day course of fidaxomicin (last day 12/8/19) and a 6 week course of vancomycin, please take these medications exactly as prescribed and follow up with your primary care doctor.  Report to the ED if your abdomen is hard and swollen, if you vomit blood or if you have bloody diarrhea, dizziness, weakness, not able to urinate.      SECONDARY DISCHARGE DIAGNOSES  Diagnosis: Acute deep vein thrombosis (DVT)  Assessment and Plan of Treatment: You were found to have an acute blood clot on your right internal jugular and subclavian veins. You have been started on Xarelto as explained above. You need to continue the above explained regimen and follow up with your primary doctor for a repeat sonogram to evaluate the clots in 3 months.    Diagnosis: Atrial fibrillation  Assessment and Plan of Treatment: Atrial fibrillation is when the top two chambers of your heart quiver really fast, which can lead to clot formation and this can put you at risk for stroke.  You are going to be discharged with a course of xarelto that is going to be 15mg twice a day (this regimen ends on 12/12/2019).  On 12/13/2019, please take 20mg xarelto by mouth once a day.  You were started on a new medication, digoxin, which helps with your heart rate.  If you experience symptoms of facial weakness, paralysis, lower or upper extremity weakness, call 911 and report to ED immedately.

## 2019-11-23 NOTE — PROGRESS NOTE ADULT - SUBJECTIVE AND OBJECTIVE BOX
S: No new events/complaints  still had 3 loose BMs today    All other pertinent ROS negative.      Vital Signs Last 24 Hrs  T(C): 35.7 (23 Nov 2019 13:01), Max: 35.7 (22 Nov 2019 21:00)  T(F): 96.3 (23 Nov 2019 13:01), Max: 96.3 (23 Nov 2019 06:31)  HR: 108 (23 Nov 2019 13:01) (71 - 108)  BP: 112/80 (23 Nov 2019 13:01) (109/66 - 133/79)  BP(mean): --  RR: 18 (23 Nov 2019 13:01) (17 - 18)  SpO2: 95% (23 Nov 2019 09:28) (95% - 95%)  PHYSICAL EXAM:    no change from prior. clear BS at bases.    MEDICATIONS:  MEDICATIONS  (STANDING):  chlorhexidine 4% Liquid 1 Application(s) Topical daily  digoxin     Tablet 0.125 milliGRAM(s) Oral daily  diltiazem    Tablet 30 milliGRAM(s) Oral every 6 hours  fidaxomicin 200 milliGRAM(s) Oral two times a day  multivitamin/minerals 1 Tablet(s) Oral daily  nystatin Powder 1 Application(s) Topical two times a day  rivaroxaban 15 milliGRAM(s) Oral two times a day with meals      LABS: All Labs Reviewed:                        12.4   5.00  )-----------( 285      ( 23 Nov 2019 11:50 )             37.7     11-23    140  |  107  |  11  ----------------------------<  86  4.5   |  19  |  0.7    Ca    7.5<L>      23 Nov 2019 11:50    TPro  5.1<L>  /  Alb  1.9<L>  /  TBili  0.4  /  DBili  x   /  AST  21  /  ALT  9   /  AlkPhos  88  11-23          Blood Culture:     Radiology: reviewed

## 2019-11-24 NOTE — PROGRESS NOTE ADULT - ATTENDING COMMENTS
# AMS 2/2 Septic shock secondary to C. Diff colitis - Resolved   Patient having formed stools and tolerating diet   - po Fidaxomicin 200 mg q12h for 10 days and then change to po vancomycin 125 mg q6h for 2 weeks then q12h for 2 more weeks, then 125 mg q24h for 2 more weeks  again had multiple loose BM on 11/23. Still considered contagious. wait for diarrhea to resolve.      # LARS likely prerenal secondary to septic shock, Resolved    - Will continue to monitor CMP     # Severe malnutrition   #Hypoalbuminemia   Likely attributable to patient's poor PO intake.    Patient's albumin was 2.9 at admission and is now 1.7.  Likely due to malabsorption as patient has active C.Diff colitis  - Will continue to monitor patient's PO intake and to encourage PO intake.  She is tolerating PO full liquids with decrease in BM frequency.   - Started ensure and MVIs with diet     # Mild elevation in troponin likely type II demand ischemia, stable   - asymptomatic   - EKG shows afib  - echo unremarkable    # Atrial fibrillation  - Apparently patient has only been on metoprolol for this. d/c diltiazem.     xarelto  dose increased to full therapeutic since she has the RUE and subclavian DVT. 15mg BID x 21 days. Then 20 mg QD.       # HTN, resolved  -Patient previously on lisinopril and HCTZ now on hold    #Tachypnea on inspection:  repeated CXRs only showing diffuse haziness at b/l bases. lungs sound clear though. Saturating fine. will continue to monitor. avoiding unnecessary antibiotics that could worsen the C diff.     Diet: CLD   GI ppx: N/A  DVT ppx: Xarelto  Activity: Increase as tolerated, pending PT/Rehab eval  Code status: Full Code (  ) / DNR ( X ) / DNI ( X )  Disposition: from home-->will likely need SNF/placement on discharge . discuss with daughter.    PT to re-evaluateto see if she can go home possibly with home PT. able to walk 50 feet.

## 2019-11-24 NOTE — PROGRESS NOTE ADULT - SUBJECTIVE AND OBJECTIVE BOX
SUBJECTIVE:    Patient is a 89y old  Female who presents with a chief complaint of Altered mental status (23 Nov 2019 16:42)    Currently admitted to medicine with the primary diagnosis of Clostridium difficile colitis     Today is hospital day 20d. This morning she is resting comfortably in bed and reports no new issues or overnight events.  Patient had last watery bowel movement yesterday-->Will considered non-contagious on monday if she does not have another loose and watery BM. She denies any chest pain, shortness of breath, abdominal pain today.  Possible discharge in the AM.     PAST MEDICAL & SURGICAL HISTORY  PAST MEDICAL & SURGICAL HISTORY:  Dyslipidemia  Hypertension  Atrial fibrillation  H/O abdominal hysterectomy  History of cholecystectomy      ALLERGIES:  penicillin (Rash)    MEDICATIONS:  STANDING MEDICATIONS  chlorhexidine 4% Liquid 1 Application(s) Topical daily  digoxin     Tablet 0.125 milliGRAM(s) Oral daily  diltiazem    Tablet 30 milliGRAM(s) Oral every 6 hours  fidaxomicin 200 milliGRAM(s) Oral two times a day  multivitamin/minerals 1 Tablet(s) Oral daily  nystatin Powder 1 Application(s) Topical two times a day  rivaroxaban 15 milliGRAM(s) Oral two times a day with meals    PRN MEDICATIONS    VITALS:   T(F): 97.1  HR: 118  BP: 100/71  RR: 17  SpO2: 95%    LABS:                        12.4   5.00  )-----------( 285      ( 23 Nov 2019 11:50 )             37.7     11-23    140  |  107  |  11  ----------------------------<  86  4.5   |  19  |  0.7    Ca    7.5<L>      23 Nov 2019 11:50    TPro  5.1<L>  /  Alb  1.9<L>  /  TBili  0.4  /  DBili  x   /  AST  21  /  ALT  9   /  AlkPhos  88  11-23                  RADIOLOGY:  < from: CT Abdomen and Pelvis No Cont (11.04.19 @ 20:49) >  IMPRESSION:       Diffuse circumferential thickening of the colon with mild pericolonic   inflammation, compatible with pancolitis. No intraperitoneal free air or   drainable collection.    < end of copied text >  PHYSICAL EXAM:  GEN: No acute distress.  Non-toxic and HD stable. Patient resting comfortably in bed.   HEENT: NCAT  LUNGS: Clear to auscultation bilaterally   HEART: Irregular rate and rhythm.  But no murmurs.   ABD: Soft, non-tender, non-distended.  No pain upon palpation on all four quadrants. +BS on all four quadrants.   EXT: no edema.  RUE swelling without erythema but is tender.  Same findings for the RLE as well. (Patient on AC)   NEURO: AAOX1.      Assessment and Plan    # AMS 2/2 Septic shock secondary to C. Diff colitis  Patient's last loose/watery BM was 11/23/2019 at 6pm-->Patient must go 48 hours without a loose/watery stool or have formed stools to be considered non-contagious.   c/w  fidaxomicin 200mg PO BID-->End date of 11/26/2019.    After fidaxomicin is done-->will transition patient  prevous vancomycin regimen ([id number] 125 PO for 2 weeks, q12 for two weeks and q24 for two weeks)--->This is on the med-rec as well. But the dates must be adjusted appropriately.   WBC within normal limits at 5.0 as of yesterday. No longer needs blood draws as discussed with attending two days ago.  GI PCR negative, C diff positive, stool cx negative   Patient tolerating DASH diet today       # LARS likely prerenal secondary to septic shock, Resolved   Patient's SCr is back to baseline (.7) as of yesterday, 11/23/2019.       #Hypoalbuminemia   Likely attributable to patient's poor PO intake.  Slightly improved to 1.9 yesterday.   c/w DASH diet w/ensure-->PO intake encouraged.     # Mild elevation in troponin likely type II demand ischemia, stable   - asymptomatic   - EKG shows afib  - echo unremarkable    # Atrial fibrillation. Rate controlled with Digoxin and Cardizem.   Rate controlled established with digoxin .125mg PO daily-->Has been trending in the 90s with a couple of reads in the 100s-110s.   - continue w. 30mg PO cardizem [id number]. Patient may go home with 25mg PO bid metoprolol-->Patient's BP may not be able to tolerate this-->Please d/w attending in regards to which meds she is being discharged with for her a-fib and HTN  - c/w xarelto 15mg PO bid twice a day for 21 days-->Will transition to 20mg PO daily there after as this is guidelines in terms treating DVT/PE. Patient's stay complicated by RUE DVT.       # HTN, resolved  -Patient previoushly on lisinopril 20mg PO daily, HCTZ 12.5 PO daily, and metoprolol 50mg PO BID-->Was not treated with cardizem as of 1/2019 (talked to her PCP.)  BP controlled established with cardizem 30mg PO [id number] (100/70 today but has been trending in the 110s-130s/60-80s over last couple of days)  When d/c plan-->D/w attending as to which anti-HTN meds she is to be discharged-->Cardizem or metoprolol-->for A-fib rate control as well.         Diet: DASH  GI ppx: N/A  DVT ppx: Xarelto  Activity: Increase as tolerated, pending PT/Rehab eval  Code status: Full Code (  ) / DNR ( X ) / DNI ( X )  Disposition: from home-->Clinically improving-->Will continue to monitor patient's BM. Spoke to patient's daughter, Emmy.  Adamantly refuses for patient to go to SNF and confirms that patient refuses SNF as well from prior interaction.  She will be going back to her senior home in 24-48

## 2019-11-25 NOTE — PROGRESS NOTE ADULT - SUBJECTIVE AND OBJECTIVE BOX
S: No new events./complaints    All other pertinent ROS negative.      Vital Signs Last 24 Hrs  T(C): 35.7 (25 Nov 2019 13:48), Max: 36.2 (25 Nov 2019 05:00)  T(F): 96.3 (25 Nov 2019 13:48), Max: 97.2 (25 Nov 2019 05:00)  HR: 82 (25 Nov 2019 13:48) (81 - 100)  BP: 166/91 (25 Nov 2019 13:48) (118/71 - 166/91)  BP(mean): --  RR: 18 (25 Nov 2019 13:48) (18 - 18)  SpO2: --  PHYSICAL EXAM:    no change from prior.   Breathing comfortably      MEDICATIONS:  MEDICATIONS  (STANDING):  chlorhexidine 4% Liquid 1 Application(s) Topical daily  digoxin     Tablet 0.125 milliGRAM(s) Oral daily  diltiazem    Tablet 30 milliGRAM(s) Oral every 6 hours  fidaxomicin 200 milliGRAM(s) Oral two times a day  multivitamin/minerals 1 Tablet(s) Oral daily  nystatin Powder 1 Application(s) Topical two times a day  rivaroxaban 15 milliGRAM(s) Oral two times a day with meals      LABS: All Labs Reviewed:                Blood Culture:     Radiology: reviewed

## 2019-11-25 NOTE — PROGRESS NOTE ADULT - SUBJECTIVE AND OBJECTIVE BOX
SUBJECTIVE:       Today is hospital day 21d. This morning she is resting comfortably in bed and reports no new issues or overnight events.     PAST MEDICAL & SURGICAL HISTORY  Dyslipidemia  Hypertension  Atrial fibrillation  H/O abdominal hysterectomy  History of cholecystectomy    ALLERGIES:  penicillin (Rash)    MEDICATIONS:  STANDING MEDICATIONS  chlorhexidine 4% Liquid 1 Application(s) Topical daily  digoxin     Tablet 0.125 milliGRAM(s) Oral daily  diltiazem    Tablet 30 milliGRAM(s) Oral every 6 hours  fidaxomicin 200 milliGRAM(s) Oral two times a day  multivitamin/minerals 1 Tablet(s) Oral daily  nystatin Powder 1 Application(s) Topical two times a day  rivaroxaban 15 milliGRAM(s) Oral two times a day with meals    PRN MEDICATIONS    VITALS:   T(F): 97  HR: 88  BP: 184/94  RR: 18  SpO2: --    LABS:                        RADIOLOGY:    PHYSICAL EXAM:  PHYSICAL EXAM:  GENERAL: NAD, lying in bed comfortably  HEAD:  Atraumatic, Normocephalic  EYES: EOMI, PERRLA, conjunctiva and sclera clear  ENT: Moist mucous membranes  NECK: Supple, No JVD  CHEST/LUNG: Clear to auscultation bilaterally; No rales, rhonchi, wheezing, or rubs. Unlabored respirations  HEART: Regular rate and rhythm; No murmurs, rubs, or gallops  ABDOMEN: Bowel sounds present; Soft, Nontender, Nondistended. No hepatomegally  EXTREMITIES:  2+ Peripheral Pulses, brisk capillary refill. No clubbing, cyanosis, or edema  NERVOUS SYSTEM:  Alert & Oriented X3, speech clear. No deficits   MSK: FROM all 4 extremities, full and equal strength  SKIN: No rashes or lesions SUBJECTIVE:       Today is hospital day 21d. This morning she is resting comfortably in bed and reports no new issues or overnight events.     PAST MEDICAL & SURGICAL HISTORY  Dyslipidemia  Hypertension  Atrial fibrillation  H/O abdominal hysterectomy  History of cholecystectomy    ALLERGIES:  penicillin (Rash)    MEDICATIONS:  STANDING MEDICATIONS  chlorhexidine 4% Liquid 1 Application(s) Topical daily  digoxin     Tablet 0.125 milliGRAM(s) Oral daily  diltiazem    Tablet 30 milliGRAM(s) Oral every 6 hours  fidaxomicin 200 milliGRAM(s) Oral two times a day  multivitamin/minerals 1 Tablet(s) Oral daily  nystatin Powder 1 Application(s) Topical two times a day  rivaroxaban 15 milliGRAM(s) Oral two times a day with meals    PRN MEDICATIONS    VITALS:   T(F): 97  HR: 88  BP: 184/94  RR: 18  SpO2: --    LABS:                  PHYSICAL EXAM:    GENERAL: NAD, lying in bed comfortably  ENT: Moist mucous membranes  NECK: Supple, No JVD  CHEST/LUNG: Clear to auscultation bilaterally;.  HEART: Irregular rate and rhythm, no murmurs.  ABDOMEN: Bowel sounds present; Soft, Nontender, Nondistended.  EXTREMITIES:  2+ Peripheral Pulses, brisk capillary refill. No clubbing, cyanosis, or edema  NERVOUS SYSTEM:  Alert & Oriented X1  SKIN: No rashes or lesions

## 2019-11-25 NOTE — PROGRESS NOTE ADULT - ASSESSMENT
# AMS 2/2 Septic shock secondary to C. Diff colitis  Patient's last loose/watery BM was 11/24/2019 at ->Patient must go 48 hours without a loose/watery stool or have formed stools to be considered non-contagious.   c/w  fidaxomicin 200mg PO BID-->End date of 11/26/2019.    After fidaxomicin is done-->will transition patient  prevous vancomycin regimen ([id number] 125 PO for 2 weeks, q12 for two weeks and q24 for two weeks)--->This is on the med-rec as well. But the dates must be adjusted appropriately.    \No longer needs blood draws  GI PCR negative, C diff positive, stool cx negative   Patient tolerating DASH diet today   Dysphagia 1 as per Speech and Swallow  f/u PT      # LARS likely prerenal secondary to septic shock, Resolved   - Cr. 0.7    #Hypoalbuminemia   Likely attributable to patient's poor PO intake.  Slightly improved to 1.9 yesterday.   c/w DASH diet w/ensure-->PO intake encouraged.     # Mild elevation in troponin likely type II demand ischemia, stable   - asymptomatic   - EKG shows afib  - echo unremarkable    # Atrial fibrillation. Rate controlled with Digoxin and Cardizem.   Rate controlled established with digoxin .125mg PO daily-->Has been trending in the 90s with a couple of reads in the 100s-110s.   - continue w. 30mg PO cardizem [id number]. Patient may go home with 25mg PO bid metoprolol-->Patient's BP may not be able to tolerate this-->Please d/w attending in regards to which meds she is being discharged with for her a-fib and HTN  - c/w xarelto 15mg PO bid twice a day for 21 days-->Will transition to 20mg PO daily there after as this is guidelines in terms treating DVT/PE. Patient's stay complicated by RUE DVT.       # HTN, resolved  -Patient previoushly on lisinopril 20mg PO daily, HCTZ 12.5 PO daily, and metoprolol 50mg PO BID-->Was not treated with cardizem as of 1/2019 (talked to her PCP.)  BP controlled established with cardizem 30mg PO [id number]  When d/c plan-->D/w attending as to which anti-HTN meds she is to be discharged-->Cardizem or metoprolol-->for A-fib rate control as well.       Diet: DASH  GI ppx: N/A  DVT ppx: Xarelto  Activity: Increase as tolerated, pending PT/Rehab eval  Code status: Full Code (  ) / DNR ( X ) / DNI ( X )  Disposition: from home-->Clinically improving-->Will continue to monitor patient's BM. Spoke to patient's daughter, Emmy.  Adamantly refuses for patient to go to SNF and confirms that patient refuses SNF as well from prior interaction.  She will be going back to her senior home in 24-48 hrs

## 2019-11-25 NOTE — CHART NOTE - NSCHARTNOTEFT_GEN_A_CORE
Registered Dietitian Follow-Up     Patient Profile Reviewed                           Yes [x]   No []     Nutrition History Previously Obtained        Yes []  No [x]  no family at b/s     Pertinent Subjective Information: Pt confused/disoriented. Spoke w/PCA feeding her. She eats mashed potatoes, but none of the ground meats/vegetables. PCA would feed it to her, but she will spit it out of her mouth. Pt edentulous, but PCA think she would tolerate puree instead. She drinks 100% of the ensure.     Pertinent Medical Interventions:   AMS 2/2 Septic shock secondary to C. Diff colitis.  LARS likely prerenal secondary to septic shock, Resolved. Hypoalbuminemia. afib. HTN resolved.      Diet order: Dysphagia 2 mech soft, DASH/TLC, thin liquids. Ensure Enlive BID      Anthropometrics:  - Ht. 154.9 cm   - Wt. 50 kg (11/4)   - %wt change  - BMI 20.8   - IBW     Pertinent Lab Data: nutrition related labs wnl      Pertinent Meds: xarelto, MVI     Physical Findings:  - Appearance: confused   - GI function: LBM 11/24  - Tubes:  - Oral/Mouth cavity: edentulous,   - Skin: 2+ L arm, 3+ R arm      Nutrition Requirements  Weight Used: 50kg, needs cont from RD assessment 11/7     Energy: 1419-4737 kcal/day (30-35 kcal/kg ABW)    Protein: 63-75 g/day (1.25-1.5 g/kg ABW)    Fluids: 1 mL/kcal        Nutrient Intake: meeting 50% of needs w/ only ensure intake        [] Previous Nutrition Diagnosis:             [] Ongoing          [] Resolved    [] No active nutrition diagnosis identified at this time       Nutrition Intervention meal/snack, med food supplements      Goal/Expected Outcome: Pt to consume >75% of meal tray in 3 days      Indicator/Monitoring: RD to monitor energy intake, diet order, body comp, NFPF     Recommendation: Consider dysphagia 1 puree thin liquid diet as pt has not been eating ground foods. Continue Ensure Enlive BID,

## 2019-11-26 NOTE — PROGRESS NOTE ADULT - ATTENDING COMMENTS
# AMS 2/2 Septic shock secondary to C. Diff colitis -    - po Fidaxomicin 200 mg q12h for 10 days and then change to po vancomycin 125 mg q6h for 2 weeks then q12h for 2 more weeks, then 125 mg q24h for 2 more weeks  last  loose BM on 11/26. Still considered contagious. wait for diarrhea to resolve    # LARS likely prerenal secondary to septic shock, Resolved        # Severe malnutrition   #Hypoalbuminemia   Likely attributable to patient's poor PO intake.    Patient's albumin was 2.9 at admission and is now 1.7.  Likely due to malabsorption as patient has active C.Diff colitis  - Will continue to monitor patient's PO intake and to encourage PO intake.  She is tolerating PO full liquids with decrease in BM frequency.   - Started ensure and MVIs with diet     # Mild elevation in troponin likely type II demand ischemia, stable   - asymptomatic   - EKG shows afib  - echo unremarkable    # Atrial fibrillation  - Apparently patient has only been on metoprolol for this. diltiazem started here.   Now HR stable. Change to PO diltiazem  mg.    **xarelto  dose increased to full therapeutic since she has the RUE and subclavian DVT. 15mg BID x 21 days. Then 20 mg QD.       # HTN, resolved  -Patient previously on lisinopril and HCTZ . Will not need lisinopril going forward given borderline BP.   May resume HCTZ now that BP is better.     #Tachypnea on inspection:  repeated CXRs only showing diffuse haziness at b/l bases. lungs sound clear though. Saturating fine. will continue to monitor. avoiding unnecessary antibiotics that could worsen the C diff.   Repeat CXR tomorrow. If suspecting volume overload, start home HCTZ.     Diet: CLD   GI ppx: N/A  DVT ppx: Xarelto  Activity: Increase as tolerated, pending PT/Rehab eval  Code status: Full Code (  ) / DNR ( X ) / DNI ( X )  Disposition: from home--> Patient can barely walk a few feet. Needs moderate to max assist. Ideally she should go to [facility] but daughter is adamant she will only send her home. The current home health aid hours are less than appropriate. We have advised daughter to do private hire for the extra hours for now until they are approved by insurance.   For now daughter refuses to take her back until the diarrhea resolves , since patient is contagious and daughter is dealing with some of her own sicknesses. # AMS 2/2 Septic shock secondary to C. Diff colitis -    - po Fidaxomicin 200 mg q12h for 10 days and then change to po vancomycin 125 mg q6h for 2 weeks then q12h for 2 more weeks, then 125 mg q24h for 2 more weeks  last  loose BM on 11/26. Still considered contagious. wait for diarrhea to resolve    # LARS likely prerenal secondary to septic shock, Resolved        # Severe malnutrition   #Hypoalbuminemia   Likely attributable to patient's poor PO intake.    Patient's albumin was 2.9 at admission and is now 1.7.  Likely due to malabsorption as patient has active C.Diff colitis  - Will continue to monitor patient's PO intake and to encourage PO intake.  She is tolerating PO full liquids with decrease in BM frequency.   - Started ensure and MVIs with diet     # Mild elevation in troponin likely type II demand ischemia, stable   - asymptomatic   - EKG shows afib  - echo unremarkable    # Atrial fibrillation  - Apparently patient has only been on metoprolol for this. diltiazem started here.   Now HR stable. Change to PO diltiazem  mg.    **xarelto  dose increased to full therapeutic since she has the RUE and subclavian DVT. 15mg BID x 21 days. Then 20 mg QD.       # HTN, resolved  -Patient previously on lisinopril and HCTZ . Will not need lisinopril going forward given borderline BP.   May resume HCTZ now that BP is better.     #Tachypnea on inspection:  repeated CXRs only showing diffuse haziness at b/l bases. lungs sound clear though. Saturating fine. will continue to monitor. avoiding unnecessary antibiotics that could worsen the C diff.   Repeat CXR tomorrow. If suspecting volume overload, start home HCTZ.     Diet: CLD   GI ppx: N/A  DVT ppx: Xarelto  Activity: Increase as tolerated, pending PT/Rehab eval  Code status: Full Code (  ) / DNR ( X ) / DNI ( X )  Disposition: from home--> Patient can barely walk a few feet. Needs moderate to max assist. Ideally she should go to [facility] but daughter is adamant she will only send her home. The current home health aid hours are less than appropriate. We have advised daughter to do private hire for the extra hours for now until they are approved by insurance.   For now daughter refuses to take her back until the diarrhea resolves , since patient is contagious and daughter is dealing with some of her own sicknesses.  Team had a long telephonic discussion with daughter.

## 2019-11-26 NOTE — PROGRESS NOTE ADULT - SUBJECTIVE AND OBJECTIVE BOX
SUBJECTIVE:      Today is hospital day 22d. This morning she is resting comfortably in bed and reports no new issues or overnight events. Patient more short of breath today with PT, could barely get out of bed. Switched to PO Vanco.    PAST MEDICAL & SURGICAL HISTORY  Dyslipidemia  Hypertension  Atrial fibrillation  H/O abdominal hysterectomy  History of cholecystectomy      ALLERGIES:  penicillin (Rash)    MEDICATIONS:  STANDING MEDICATIONS  chlorhexidine 4% Liquid 1 Application(s) Topical daily  digoxin     Tablet 0.125 milliGRAM(s) Oral daily  diltiazem    Tablet 30 milliGRAM(s) Oral four times a day  fidaxomicin 200 milliGRAM(s) Oral two times a day  multivitamin/minerals 1 Tablet(s) Oral daily  nystatin Powder 1 Application(s) Topical two times a day  rivaroxaban 15 milliGRAM(s) Oral two times a day with meals  vancomycin    Solution 125 milliGRAM(s) Oral every 6 hours    PRN MEDICATIONS    VITALS:   T(F): 96  HR: 94  BP: 124/68  RR: 18  SpO2: --      PHYSICAL EXAM:  GENERAL: NAD, lying in bed comfortably  ENT: Moist mucous membranes  NECK: Supple, No JVD  CHEST/LUNG: Clear to auscultation bilaterally;.  HEART: Irregular rate and rhythm, no murmurs.  ABDOMEN: Bowel sounds present; Soft, Nontender, Nondistended.  EXTREMITIES:  2+ Peripheral Pulses, brisk capillary refill. No clubbing, cyanosis, or edema  NERVOUS SYSTEM:  Alert & Oriented X1  SKIN: No rashes or lesions

## 2019-11-26 NOTE — PROGRESS NOTE ADULT - ASSESSMENT
# AMS 2/2 Septic shock secondary to C. Diff colitis  Patient's last loose/watery BM was 11/24/2019 at ->Patient must go 48 hours without a loose/watery stool or have formed stools to be considered non-contagious.   c/w  fidaxomicin 200mg PO BID-->End date of 11/25/2019.    will transition patient  to previous vancomycin regimen ([id number] 125 PO for 2 weeks, q12 for two weeks end date 12/10/2019 and q24 for two weeks end date 12/24/19)--->This is on the med-rec as well. But the dates must be adjusted appropriately.   No longer needs blood draws per attending  GI PCR negative, C diff positive, stool cx negative   Patient tolerating DASH diet today   Dysphagia 1 as per Speech and Swallow  f/u PT - patient not tolerating PT well  will get chest X-ray    # LARS likely prerenal secondary to septic shock, Resolved   - Cr. 0.7    #Hypoalbuminemia   Likely attributable to patient's poor PO intake.  Slightly improved to 1.9 yesterday.   c/w DASH diet w/ensure-->PO intake encouraged.     # Mild elevation in troponin likely type II demand ischemia, stable   - asymptomatic   - EKG shows afib  - echo unremarkable    # Atrial fibrillation. Rate controlled with Digoxin and Cardizem.   Rate controlled established with digoxin .125mg PO daily-->Has been trending in the 90s with a couple of reads in the 100s-110s.   - continue w Cardizem 120 CD starting tomorrow.. Patient may go home with 25mg PO bid metoprolol-->Patient's BP may not be able to tolerate this-->Please d/w attending in regards to which meds she is being discharged with for her a-fib and HTN  - c/w xarelto 15mg PO bid twice a day for 21 days-->Will transition to 20mg PO daily there after as this is guidelines in terms treating DVT/PE. Patient's stay complicated by RUE DVT.     # HTN, resolved  -Patient previoushly on lisinopril 20mg PO daily, HCTZ 12.5 PO daily, and metoprolol 50mg PO BID-->Was not treated with cardizem as of 1/2019 (talked to her PCP.)  c/w Cardizem  When d/c plan-->D/w attending as to which anti-HTN meds she is to be discharged-->Cardizem or metoprolol-->for A-fib rate control as well.       Diet: DASH  GI ppx: N/A  DVT ppx: Xarelto  Activity: Increase as tolerated, pending PT/Rehab eval  Code status: Full Code (  ) / DNR ( X ) / DNI ( X )  Disposition: from home-->Clinically improving-->Will continue to monitor patient's BM. Spoke to patient's daughter, Emmy.  Adamantly refuses for patient to go to SNF and confirms that patient refuses SNF as well from prior interaction.  She will be going back to her senior home in 24-48 hrs

## 2019-11-27 NOTE — PROGRESS NOTE ADULT - ASSESSMENT
________________________________________________________________________________  DAILY PROGRESS NOTE:    ================== SUBJECTIVE ==================    STEPH DAI  /   89y  /  Female  /  MRN#: [id number]  Patient is a 89y old Female who presents with a chief complaint of Altered mental status (26 Nov 2019 15:20)  Currently admitted to medicine with the primary diagnosis of Clostridium difficile colitis    HOSPITAL DAY: 23d     OVERNIGHT EVENTS: None     TODAY: Patient was seen this morning at bedside. Currently, the patient reports no complaints. Is weak     Review of systems is otherwise negative.    =================== OBJECTIVE ===================    VITAL SIGNS: Last 24 Hours  T(C): 35.7 (27 Nov 2019 04:46), Max: 35.9 (26 Nov 2019 20:47)  T(F): 96.3 (27 Nov 2019 04:46), Max: 96.7 (26 Nov 2019 20:47)  HR: 100 (27 Nov 2019 04:46) (94 - 107)  BP: 158/82 (27 Nov 2019 04:46) (124/68 - 161/79)  BP(mean): --  RR: 18 (27 Nov 2019 04:46) (18 - 18)  SpO2: 100% (26 Nov 2019 18:00) (100% - 100%)    11-26-19 @ 07:01  -  11-27-19 @ 07:00  --------------------------------------------------------  IN: 0 mL / OUT: 2 mL / NET: -2 mL    PHYSICAL EXAM:  GENERAL: NAD, lying in bed comfortably  ENT: Moist mucous membranes  NECK: Supple, No JVD  CHEST/LUNG: Clear to auscultation bilaterally;.  HEART: Irregular rate and rhythm, no murmurs.  ABDOMEN: Bowel sounds present; Soft, Nontender, Nondistended.  EXTREMITIES:  2+ Peripheral Pulses, brisk capillary refill. No clubbing, cyanosis, or edema  NERVOUS SYSTEM:  Alert & Oriented X1  SKIN: No rashes or lesions    ================== ALLERGIES ===================  penicillin (Rash)      ==================== MEDS =====================  chlorhexidine 4% Liquid 1 Application(s) Topical daily  digoxin     Tablet 0.125 milliGRAM(s) Oral daily  diltiazem    milliGRAM(s) Oral daily  fidaxomicin 200 milliGRAM(s) Oral two times a day  multivitamin/minerals 1 Tablet(s) Oral daily  nystatin Powder 1 Application(s) Topical two times a day  rivaroxaban 15 milliGRAM(s) Oral two times a day with meals  vancomycin    Solution 125 milliGRAM(s) Oral every 6 hours    PRN MEDICATIONS      ================= ASSESSMENT/PLAN ==================    # AMS 2/2 Septic shock secondary to C. Diff colitis  Patient still having watery stools -> Patient must go 48 hours without a loose/watery stool or have formed stools to be considered non-contagious   No longer needs blood draws per attending  GI PCR negative, C diff positive, stool cx negative   Patient tolerating DASH diet today   - c/w  fidaxomicin 200mg PO BID-->End date of 11/25/2019.    will transition patient  to previous vancomycin regimen ([id number] 125 PO for 2 weeks, q12 for two weeks end date 12/10/2019 and q24 for two weeks end date 12/24/19)--->This is on the med-rec  - Dysphagia 1 as per Speech and Swallow and tolerating     # LARS likely prerenal secondary to septic shock, Resolved   - Cr. 0.7    #Hypoalbuminemia   Likely attributable to patient's poor PO intake.  Slightly improved to 1.9 yesterday.   c/w DASH diet w/ensure-->PO intake encouraged.     # Mild elevation in troponin likely type II demand ischemia, stable   - asymptomatic   - EKG shows afib  - echo unremarkable    # Atrial fibrillation. Rate controlled with Digoxin and Cardizem.   Rate controlled established with digoxin .125mg PO daily-->Has been trending in the 90s with a couple of reads in the 100s-110s.   - continue w Cardizem 120 CD starting tomorrow.. Patient may go home with 25mg PO bid metoprolol-->Patient's BP may not be able to tolerate this-->Please d/w attending in regards to which meds she is being discharged with for her a-fib and HTN  - c/w xarelto 15mg PO bid twice a day for 21 days (Day 7)-->Will transition to 20mg PO daily there after as this is guidelines in terms treating DVT/PE. Patient's stay complicated by RUE DVT.   - Repeat US as outpatient     # HTN, resolved  -Patient previoushly on lisinopril 20mg PO daily, HCTZ 12.5 PO daily, and metoprolol 50mg PO BID-->Was not treated with cardizem as of 1/2019 (talked to her PCP.)  c/w Cardizem  When d/c plan-->D/w attending as to which anti-HTN meds she is to be discharged-->Cardizem or metoprolol-->for A-fib rate control as well.       Diet: DASH  GI ppx: N/A  DVT ppx: Xarelto  Activity: Increase as tolerated, unable to walk with PT   Code status: Full Code (  ) / DNR ( X ) / DNI ( X )  Disposition: from home-->Clinically improving-->Will continue to monitor patient's BM. Spoke to patient's daughter, Emmy.  Adamantly refuses for patient to go to SNF and confirms that patient refuses SNF as well from prior interaction.  She will be going back to her senior home when no water bowel movements for 48 hours

## 2019-11-27 NOTE — PROGRESS NOTE ADULT - ATTENDING COMMENTS
I saw and evaluated patient  by bedside, as per covering nurse report, patient had 2 large loose bm's at night, tolerating diet. no fever.  All labs, radiology studies, VS was reviewed  I have reviewed the resident's note and agree with documented findings and  plan of care.  C.Diff diarrhea- responsive to dificille tx., since our pharmacy was out of stock, we asked to reorder medicine, meanwhile continue PO vanco tx.  h/o HTN- controlled on current meds tx.  h/o persistent afib- on diltiazem,digoxin, xarelto tx.  physical deconditioning, poor functional status- patient is a good candidate for SNF d/c once medically stable, daughter is refusing for pt. to be d/c to SNF.  #Progress Note Handoff: Pending resolution of diarrhea.  Family Discussion: covering senior resident updated daughter of patient's medical plan of therapy Disposition: Home__ with caregiver vs. SNF

## 2019-11-28 NOTE — PROGRESS NOTE ADULT - SUBJECTIVE AND OBJECTIVE BOX
STEPH DAI  [facility] T2-3A 027 A ([facility] T2-3A)            Patient was evaluated and examined  by bedside, remains afebrile, deconditioned, unable to ambulate, patient has poor appetite, 3 loose bms at night , 2 loose bm's in am.                 REVIEW OF SYSTEMS:  unable to obtain due to patients confusion state      T(C): , Max: 36.6 (11-27-19 @ 20:26)  HR: 96 (11-28-19 @ 06:39)  BP: 148/83 (11-28-19 @ 06:39)  RR: 18 (11-27-19 @ 20:26)  SpO2: --  CAPILLARY BLOOD GLUCOSE          PHYSICAL EXAM:  General: NAD, Awake, confused, patient is laying comfortably in bed  HEENT: AT, NC, Supple, NO JVD, NO CB  Lungs: CTA B/L, no wheezing, no rhonchi  CVS: normal S1, S2, RRR, NO M/G/R  Abdomen: soft, bowel sounds present, non-tender, non-distended  Extremities: no edema, no clubbing, no cyanosis, positive peripheral pulses b/l  Neuro: no acute focal neurological deficits, pt. unable to ambulate due to generalized body weakness, chronic confused state.  Skin: no rash, no ecchymosis      LAB  CBC  Date: 11-23-19 @ 11:50  Mean cell Kwlywlspll28.5  Mean cell Hemoglobin Conc32.9  Mean cell Volum 89.8  Platelet count-Automate 285  RBC Count 4.20  Red Cell Distrib Width14.7  WBC Count5.00  % Albumin, Urine--  Hematocrit 37.7  Hemoglobin 12.4                Microbiology:    GI PCR Panel, Stool (collected 11-06-19 @ 15:00)  Source: .Stool Feces  Final Report (11-07-19 @ 03:16):    GI PCR Results: NOT detected    *******Please Note:*******    GI panel PCR evaluates for:    Campylobacter, Plesiomonas shigelloides, Salmonella,    Vibrio, Yersinia enterocolitica, Enteroaggregative    Escherichia coli (EAEC), Enteropathogenic E.coli (EPEC),    Enterotoxigenic E. coli (ETEC) lt/st, Shiga-like    toxin-producing E. coli (STEC) stx1/stx2,    Shigella/ Enteroinvasive E. coli (EIEC), Cryptosporidium,    Cyclospora cayetanensis, Entamoeba histolytica,    Giardia lamblia, Adenovirus F 40/41, Astrovirus,    Norovirus GI/GII, Rotavirus A, Sapovirus    Culture - Stool (collected 11-06-19 @ 15:00)  Source: .Stool Feces  Final Report (11-08-19 @ 16:40):    No enteric pathogens isolated.    (Stool culture examined for Salmonella,    Shigella, Campylobacter, Aeromonas, Plesiomonas,    Vibrio, E.coli O157 and Yersinia)    Culture - Blood (collected 11-04-19 @ 18:00)  Source: .Blood Blood-Peripheral  Final Report (11-10-19 @ 02:00):    No growth at 5 days.    Culture - Blood (collected 11-04-19 @ 18:00)  Source: .Blood Blood-Peripheral  Final Report (11-10-19 @ 02:00):    No growth at 5 days.    Culture - Urine (collected 11-04-19 @ 16:30)  Source: .Urine Clean Catch (Midstream)  Final Report (11-06-19 @ 00:19):    No growth          Medications:  chlorhexidine 4% Liquid 1 Application(s) Topical daily  digoxin     Tablet 0.125 milliGRAM(s) Oral daily  diltiazem    milliGRAM(s) Oral daily  fidaxomicin 200 milliGRAM(s) Oral two times a day  multivitamin/minerals 1 Tablet(s) Oral daily  nystatin Powder 1 Application(s) Topical two times a day  rivaroxaban 15 milliGRAM(s) Oral two times a day with meals  vancomycin    Solution 125 milliGRAM(s) Oral every 6 hours        Assessment and Plan:  C.Diff diarrhea- responsive to dificille tx., continue fidaxomicin tx  h/o HTN- controlled on current meds tx.  h/o persistent afib- on diltiazem,digoxin, xarelto tx.  Poor appetite- started on Megace tx.   physical deconditioning, poor functional status- patient is a good candidate for SNF d/c once medically stable, daughter is refusing for pt. to be d/c to SNF.  #Progress Note Handoff: Pending resolution of diarrhea.  Family Discussion: covering senior resident updated daughter of patient's medical plan of therapy Disposition: Home__ with caregiver vs. SNF .

## 2019-11-28 NOTE — CHART NOTE - NSCHARTNOTEFT_GEN_A_CORE
Registered Dietitian Follow-Up     Patient Profile Reviewed                           Yes [x]   No []     Nutrition History Previously Obtained        Yes [] No [x]  family unreachable     Pertinent Subjective Information: Pt w/ fair po intake per PCA. She drinks most of Ensures. 1:1 feed      Pertinent Medical Interventions: AMS 2/2 Septic shock secondary to C. Diff colitis. Cont watery stools. Hypoalbuminemia. Pt's daughter refusing SNF, will go to senior home once watery stool resolved     Diet order: Dysphagia 1 puree, thin liquids DASH/TLC, Ensure enlive BID     Anthropometrics:  - Ht. 154.9 cm   - Wt. 50 kg (11/4)   - %wt change  - BMI 20.8   - IBW     Pertinent Lab Data: nutrition related labs wnl      Pertinent Meds: xarelto, MVI w/ minerals      Physical Findings:  - Appearance: confused   - GI function: LBM 11/28 loose  - Tubes:  - Oral/Mouth cavity: edentulous, Per SLP eval, +mild oral dysphagia for puree; +toleration for puree w/o overt s/s aspiration/penetration  - Skin: 3+ edema b/l leg     Nutrition Requirements  Weight Used: 50kg, needs cont from RD assessment 11/7     Energy: 5891-3187 kcal/day (30-35 kcal/kg ABW)    Protein: 63-75 g/day (1.25-1.5 g/kg ABW)    Fluids: 1 mL/kcal        Nutrient Intake: improving. not meeting needs         [] Previous Nutrition Diagnosis:   Increased Nutrient Needs/ Inadequate protein/energy intake             [x] Ongoing (both)         [] Resolved    [] No active nutrition diagnosis identified at this time    Nutrition Intervention meal/snack, med food supplements      Goal/Expected Outcome: Pt to consume >75% of meal tray in 4 days      Indicator/Monitoring: RD to monitor energy intake, diet order, body comp, NFPF     Recommendation: Cont dysphagia 1 puree thin liquid, DASH/TLC diet w/ Ensure Enlive BID. 1:1 feeds

## 2019-11-29 NOTE — PROGRESS NOTE ADULT - ATTENDING COMMENTS
I saw and evaluated patient  by bedside, no c/o abdominal pain, patient remains very weak, slightly confused, oriented to self and place, not oriented to time, appetite has improved.  All labs, radiology studies, VS was reviewed  I have reviewed the resident's note and agree with documented findings and  plan of care.    C.Diff diarrhea- responsive to dificille tx., continue fidaxomicin tx  h/o HTN- added on lasix tx.   h/o persistent afib- on diltiazem,digoxin, xarelto tx.  Poor appetite- started on Megace tx.   physical deconditioning, poor functional status- patient is a good candidate for SNF d/c once medically stable, daughter is refusing for pt. to be d/c to SNF.  #Progress Note Handoff: diarrhea has resolved. appetite has improved, started on PT tx.  Family Discussion: patient will benefit from SNF placement, she needs 2 people assistance with daily living activities Disposition: Home__ with caregiver 24 care vs. SNF .

## 2019-11-29 NOTE — PROGRESS NOTE ADULT - ASSESSMENT
# AMS 2/2 Septic shock secondary to C. Diff colitis   Patient must go 48 hours without a loose/watery stool or have formed stools to be considered non-contagious   GI PCR negative, C diff positive, stool cx negative   Patient tolerating DASH diet today   - c/w  fidaxomicin 200mg PO BID   will possibly transition patient to previous vancomycin regimen ([id number] 125 PO for 2 weeks, q12 for two weeks9 and q24 for two weeks e-->This is on the med-rec  - Dysphagia 1 as per Speech and Swallow and tolerating   - Patient deconditioned- will need PT    # Labored breathing  - diminished breath sounds at the bases today  - patient appears overloaded  - will give 40 mg of Lasix PO today    # LARS likely prerenal secondary to septic shock  - Resolved   - Cr. 0.7    #Hypoalbuminemia   Likely attributable to patient's poor PO intake.  Slightly improved to 1.9 yesterday.   c/w DASH diet w/ensure-->PO intake encouraged.     # Mild elevation in troponin likely type II demand ischemia, stable   - asymptomatic   - EKG shows afib  - echo unremarkable    # Atrial fibrillation. Rate controlled with Digoxin and Cardizem.   Rate controlled established with digoxin .125mg PO daily-->Has been trending in the 90s with a couple of reads in the 100s-110s.   - continue w Cardizem 120 CD starting tomorrow.. Patient may go home with 25mg PO bid metoprolol-->Patient's BP may not be able to tolerate this-->Please d/w attending in regards to which meds she is being discharged with for her a-fib and HTN  - c/w xarelto 15mg PO bid twice a day for 21 days (Day 9)-->Will transition to 20mg PO daily there after as this is guidelines in terms treating DVT/PE. Patient's stay complicated by RUE DVT.   - Repeat US as outpatient     # HTN, resolved  -Patient previoushly on lisinopril 20mg PO daily, HCTZ 12.5 PO daily, and metoprolol 50mg PO BID--  >Was not treated with cardizem as of 1/2019 (talked to her PCP.)  c/w Cardizem 120 mg XL- can upgrade to 180 mg CD for better control of BP and HR  When d/c plan-->D/w attending as to which anti-HTN meds she is to be discharged-->Cardizem or metoprolol-->for A-fib rate control as well.     Diet: DASH  GI ppx: N/A  DVT ppx: Xarelto  Activity: Increase as tolerated, unable to walk with PT   Code status: Full Code (  ) / DNR ( X ) / DNI ( X )  Disposition: from home  Will continue to monitor patient's BM. Spoke to patient's daughter, Emmy.  Adamantly refuses for patient to go to SNF and confirms that patient refuses SNF as well from prior interaction.  She will be going back to her senior home when no water bowel movements for 48 hours

## 2019-11-29 NOTE — PROGRESS NOTE ADULT - SUBJECTIVE AND OBJECTIVE BOX
SUBJECTIVE:       Today is hospital day 25d. No watery stools overnight, afebrile overnight.    PAST MEDICAL & SURGICAL HISTORY  Dyslipidemia  Hypertension  Atrial fibrillation  H/O abdominal hysterectomy  History of cholecystectomy    SOCIAL HISTORY:  Negative for smoking/alcohol/drug use.     ALLERGIES:  penicillin (Rash)    MEDICATIONS:  STANDING MEDICATIONS  chlorhexidine 4% Liquid 1 Application(s) Topical daily  digoxin     Tablet 0.125 milliGRAM(s) Oral daily  diltiazem    milliGRAM(s) Oral daily  fidaxomicin 200 milliGRAM(s) Oral two times a day  megestrol 400 milliGRAM(s) Oral daily  multivitamin/minerals 1 Tablet(s) Oral daily  nystatin Powder 1 Application(s) Topical two times a day  rivaroxaban 15 milliGRAM(s) Oral two times a day with meals    PRN MEDICATIONS    VITALS:   T(F): 96.2  HR: 97  BP: 104/64  RR: 16  SpO2: --                PHYSICAL EXAM:  GENERAL: NAD, lying in bed comfortably  ENT: Moist mucous membranes  NECK: Supple, No JVD  CHEST/LUNG: Clear to auscultation bilaterally;.  HEART: Irregular rate and rhythm, no murmurs.  ABDOMEN: Bowel sounds present; Soft, Nontender, Nondistended.  EXTREMITIES:  2+ Peripheral Pulses, brisk capillary refill. No clubbing, cyanosis, or edema  NERVOUS SYSTEM:  Alert & Oriented X1  SKIN: No rashes or lesions

## 2019-11-30 NOTE — PROGRESS NOTE ADULT - SUBJECTIVE AND OBJECTIVE BOX
SUBJECTIVE:     Today is hospital day 26d. Continues to have labored breathing, appears comfortable on room air.    PAST MEDICAL & SURGICAL HISTORY  Dyslipidemia  Hypertension  Atrial fibrillation  H/O abdominal hysterectomy  History of cholecystectomy      ALLERGIES:  penicillin (Rash)    MEDICATIONS:  STANDING MEDICATIONS  chlorhexidine 4% Liquid 1 Application(s) Topical daily  digoxin     Tablet 0.125 milliGRAM(s) Oral daily  diltiazem    milliGRAM(s) Oral daily  fidaxomicin 200 milliGRAM(s) Oral two times a day  furosemide   Injectable 40 milliGRAM(s) IV Push once  megestrol 400 milliGRAM(s) Oral daily  multivitamin/minerals 1 Tablet(s) Oral daily  nystatin Powder 1 Application(s) Topical two times a day  rivaroxaban 15 milliGRAM(s) Oral two times a day with meals    PRN MEDICATIONS    VITALS:   T(F): 96.5  HR: 88  BP: 139/91  RR: 16  SpO2: --      PHYSICAL EXAM:  GENERAL: NAD, lying in bed comfortably  ENT: Moist mucous membranes  NECK: Supple, No JVD  CHEST/LUNG: Slgihtly diminished breath sounds at bases, no wheezing. Poor quality exam given patient not able to follow commands.  HEART: Irregular rate and rhythm, no murmurs.  ABDOMEN: Bowel sounds present; Soft, Nontender, Nondistended.  EXTREMITIES:  2+ Peripheral Pulses, brisk capillary refill. No clubbing, cyanosis, or edema  NERVOUS SYSTEM:  Alert & Oriented X1  SKIN: No rashes or lesions

## 2019-11-30 NOTE — PROGRESS NOTE ADULT - ATTENDING COMMENTS
I saw and evaluated patient  by bedside, noted to have mild dyspnea at rest, legs edema , tolerating diet well at least 50% of meal.   All labs, radiology studies, VS was reviewed  I have reviewed the resident's note and agree with documented findings and  plan of care.    C.Diff diarrhea- resolved with  dificille tx., continue fidaxomicin tx  Mild dyspnea- mild pulmonary congestion- given additional iv lasix dose today, monitor pt. clinically  h/o HTN- added on lasix tx.   h/o persistent afib- on diltiazem,digoxin, xarelto tx.  Poor appetite- started on Megace tx.   Hypomagnesemia - supplemented  physical deconditioning, poor functional status- patient is a good candidate for SNF d/c once medically stable, daughter is refusing for pt. to be d/c to SNF.  #Progress Note Handoff: diarrhea has resolved. appetite has improved, started on PT tx., diuresis for mild volume od status today.  Family Discussion: patient will benefit from SNF placement, she needs 2 people assistance with daily living activities Disposition: Home__ with caregiver 24 care vs. SNF .

## 2019-11-30 NOTE — PROGRESS NOTE ADULT - ASSESSMENT
# AMS 2/2 Septic shock secondary to C. Diff colitis   Patient must go 48 hours without a loose/watery stool or have formed stools to be considered non-contagious   GI PCR negative, C diff positive, stool cx negative   Patient tolerating DASH diet today   - c/w  fidaxomicin 200mg PO BID   will possibly transition patient to previous vancomycin regimen ([id number] 125 PO for 2 weeks, q12 for two weeks9 and q24 for two weeks e-->This is on the med-rec  - Dysphagia 1 as per Speech and Swallow and tolerating   - 1 watery bowel movement overnight  - Patient deconditioned- will need PT    # Labored breathing  - diminished breath sounds at the bases today, + LE edema  - patient appears overloaded  - will give 40 mg of Lasix 40 mg IV  - saturating 92% on room air, will start NC as per attending    # LARS likely prerenal secondary to septic shock  - Resolved   - Cr. 0.7    #Hypoalbuminemia   Likely attributable to patient's poor PO intake.  Slightly improved to 1.9 yesterday.   c/w DASH diet w/ensure-->PO intake encouraged.     # Mild elevation in troponin likely type II demand ischemia, stable   - asymptomatic   - EKG shows afib  - echo unremarkable    # Atrial fibrillation. Rate controlled with Digoxin and Cardizem.   Rate controlled established with digoxin .125mg PO daily-->Has been trending in the 90s with a couple of reads in the 100s-110s.   - continue w Cardizem 120 CD starting tomorrow.. Patient may go home with 25mg PO bid metoprolol-->Patient's BP may not be able to tolerate this-->Please d/w attending in regards to which meds she is being discharged with for her a-fib and HTN  - c/w xarelto 15mg PO bid twice a day for 21 days (Day 12)-->Will transition to 20mg PO daily there after as this is guidelines in terms treating DVT/PE. Patient's stay complicated by RUE DVT.   - Repeat US as outpatient     # HTN, resolved  -Patient previoushly on lisinopril 20mg PO daily, HCTZ 12.5 PO daily, and metoprolol 50mg PO BID--  >Was not treated with cardizem as of 1/2019 (talked to her PCP.)  c/w Cardizem 120 mg XL- can upgrade to 180 mg CD for better control of BP and HR  When d/c plan-->D/w attending as to which anti-HTN meds she is to be discharged-->Cardizem or metoprolol-->for A-fib rate control as well.     Diet: DASH  GI ppx: N/A  DVT ppx: Xarelto  Activity: Increase as tolerated, unable to walk with PT   Code status: Full Code (  ) / DNR ( X ) / DNI ( X )  Disposition: from home

## 2019-12-01 NOTE — PROGRESS NOTE ADULT - SUBJECTIVE AND OBJECTIVE BOX
STEPH DAI  [facility] T2-3A 027 A ([facility] T[facility])            Patient was evaluated and examined  by bedside, has resolved diarrhea, improved appetite, no fever. no dyspnea                REVIEW OF SYSTEMS:  unable to obtain, patient is confused      T(C): , Max: 36.2 (11-30-19 @ 21:28)  HR: 81 (12-01-19 @ 13:55)  BP: 111/61 (12-01-19 @ 13:55)  RR: 18 (12-01-19 @ 13:55)  SpO2: 97% (12-01-19 @ 05:18)  CAPILLARY BLOOD GLUCOSE          PHYSICAL EXAM:  General: NAD, Awake, confused, patient is laying comfortably in bed  HEENT: AT, NC, Supple, NO JVD, NO CB  Lungs: mild decreased breath sounds B/L due to poor inspiratory effort, no wheezing, no rhonchi  CVS: normal S1, S2, iRiRR, NO M/G/R  Abdomen: soft, bowel sounds present, non-tender, non-distended  Extremities: mild pitting distal upper and lower ext. edema, no clubbing, no cyanosis, positive peripheral pulses b/l  Neuro: generalized body weakness, chronic confused state, gait not tested  Skin: no rash, no ecchymosis      LAB  CBC  Date: 11-30-19 @ 21:56  Mean cell Dwbogojkkv60.5  Mean cell Hemoglobin Conc33.0  Mean cell Volum 89.3  Platelet count-Automate 223  RBC Count 3.83  Red Cell Distrib Width14.6  WBC Count7.41  % Albumin, Urine--  Hematocrit 34.2  Hemoglobin 11.3  CBC  Date: 11-30-19 @ 11:37  Mean cell Gilsamsnvc11.6  Mean cell Hemoglobin Conc31.4  Mean cell Volum 91.2  Platelet count-Automate 234  RBC Count 3.77  Red Cell Distrib Width14.9  WBC Count5.30  % Albumin, Urine--  Hematocrit 34.4  Hemoglobin 10.8    BMP  11-30-19 @ 11:37  Blood Gas Arterial-Calcium,Ionized--  Blood Urea Nitrogen, Serum 12 mg/dL [10 - 20]  Carbon Dioxide, Serum30 mmol/L [17 - 32]  Chloride, Serum99 mmol/L [98 - 110]  Creatinie, Serum0.6 mg/dL<L> [0.7 - 1.5]  Glucose, [id number] mg/dL<H> [70 - 99]  Potassium, Serum4.2 mmol/L [3.5 - 5.0]  Sodium, Serum 138 mmol/L [135 - 146]              Microbiology:    GI PCR Panel, Stool (collected 11-06-19 @ 15:00)  Source: .Stool Feces  Final Report (11-07-19 @ 03:16):    GI PCR Results: NOT detected    *******Please Note:*******    GI panel PCR evaluates for:    Campylobacter, Plesiomonas shigelloides, Salmonella,    Vibrio, Yersinia enterocolitica, Enteroaggregative    Escherichia coli (EAEC), Enteropathogenic E.coli (EPEC),    Enterotoxigenic E. coli (ETEC) lt/st, Shiga-like    toxin-producing E. coli (STEC) stx1/stx2,    Shigella/ Enteroinvasive E. coli (EIEC), Cryptosporidium,    Cyclospora cayetanensis, Entamoeba histolytica,    Giardia lamblia, Adenovirus F 40/41, Astrovirus,    Norovirus GI/GII, Rotavirus A, Sapovirus    Culture - Stool (collected 11-06-19 @ 15:00)  Source: .Stool Feces  Final Report (11-08-19 @ 16:40):    No enteric pathogens isolated.    (Stool culture examined for Salmonella,    Shigella, Campylobacter, Aeromonas, Plesiomonas,    Vibrio, E.coli O157 and Yersinia)    Culture - Blood (collected 11-04-19 @ 18:00)  Source: .Blood Blood-Peripheral  Final Report (11-10-19 @ 02:00):    No growth at 5 days.    Culture - Blood (collected 11-04-19 @ 18:00)  Source: .Blood Blood-Peripheral  Final Report (11-10-19 @ 02:00):    No growth at 5 days.    Culture - Urine (collected 11-04-19 @ 16:30)  Source: .Urine Clean Catch (Midstream)  Final Report (11-06-19 @ 00:19):    No growth        Medications:  chlorhexidine 4% Liquid 1 Application(s) Topical daily  digoxin     Tablet 0.125 milliGRAM(s) Oral daily  diltiazem    milliGRAM(s) Oral daily  furosemide    Tablet 20 milliGRAM(s) Oral daily  magnesium oxide 400 milliGRAM(s) Oral three times a day with meals  megestrol 400 milliGRAM(s) Oral daily  multivitamin/minerals 1 Tablet(s) Oral daily  nystatin Powder 1 Application(s) Topical two times a day  rivaroxaban 15 milliGRAM(s) Oral two times a day with meals        Assessment and Plan:    C.Diff diarrhea- resolved with  dificille tx., continue fidaxomicin tx  h/o HTN- added on lasix tx.   h/o persistent afib- on diltiazem,digoxin, xarelto tx.  Acute DVT of right internal jugular and subclavian DVT- on therap. Xarelto dose.  Poor appetite- appetite has improved post initiation of Megace tx.   Hypomagnesemia - supplemented  physical deconditioning, poor functional status- patient is a good candidate for SNF d/c once medically stable, daughter is refusing for pt. to be d/c to SNF.  #Progress Note Handoff: diarrhea has resolved. appetite has improved, started on PT tx., continue po  diuresis for mild volume od status today.  Family Discussion: patient will benefit from SNF placement, she needs 2 people assistance with daily living activities Disposition: Home__ with caregiver 24 care vs. SNF .

## 2019-12-02 NOTE — CHART NOTE - NSCHARTNOTEFT_GEN_A_CORE
Registered Dietitian Follow-Up     Patient Profile Reviewed                           Yes [x]   No []     Nutrition History Previously Obtained        Yes []  No [x]       Pertinent Subjective Information: Pt w/ improved appetite. Continues to drink 100% of Ensure. 1:1 feeds    Pertinent Medical Interventions: AMS 2/2 Septic shock secondary to C. Diff colitis. Hypomagnesemia - supplemented. Hypoalbuminemia. Pending SNF     Diet order: Dysphagia 1 puree, thin liquids DASH/TLC, Ensure enlive BID     Anthropometrics:  - Ht. 154.9 cm   - Wt. 50 kg (11/4)   - %wt change  - BMI 20.8   - IBW     Pertinent Lab Data: nutrition related labs wnl      Pertinent Meds: xarelto, MVI w/ minerals, megace     Physical Findings:  - Appearance: confused   - GI function: LBM 12/1 loose  - Tubes:  - Oral/Mouth cavity: edentulous, Per SLP eval, +mild oral dysphagia for puree; +toleration for puree w/o overt s/s aspiration/penetration  - Skin: 3+ edema b/l leg     Nutrition Requirements  Weight Used: 50kg, needs cont from RD assessment 11/7     Energy: 7598-0145 kcal/day (30-35 kcal/kg ABW)    Protein: 63-75 g/day (1.25-1.5 g/kg ABW)    Fluids: 1 mL/kcal     Nutrient Intake: meeting needs        [] Previous Nutrition Diagnosis:   Increased Nutrient Needs (ongoing)/ Inadequate protein/energy intake (resolved)            [] Ongoing          [x] Resolved    [] No active nutrition diagnosis identified at this time    Nutrition Intervention meal/snack, med food supplements      Goal/Expected Outcome: Pt to consume >75% of meal tray in 7 days      Indicator/Monitoring: RD to monitor energy intake, diet order, body comp, NFPF     Recommendation: Cont dysphagia 1 puree thin liquid, DASH/TLC diet w/ Ensure Enlive BID. 1:1 feeds.

## 2019-12-02 NOTE — PROGRESS NOTE ADULT - SUBJECTIVE AND OBJECTIVE BOX
SUBJECTIVE:       Today is hospital day 28d. This morning she is resting comfortably in bed and reports no new issues or overnight events.     PAST MEDICAL & SURGICAL HISTORY  Dyslipidemia  Hypertension  Atrial fibrillation  H/O abdominal hysterectomy  History of cholecystectomy    ALLERGIES:  penicillin (Rash)    MEDICATIONS:  STANDING MEDICATIONS  chlorhexidine 4% Liquid 1 Application(s) Topical daily  digoxin     Tablet 0.125 milliGRAM(s) Oral daily  diltiazem    milliGRAM(s) Oral daily  fidaxomicin 200 milliGRAM(s) Oral two times a day  furosemide    Tablet 20 milliGRAM(s) Oral daily  magnesium oxide 400 milliGRAM(s) Oral three times a day with meals  megestrol 400 milliGRAM(s) Oral daily  multivitamin/minerals 1 Tablet(s) Oral daily  nystatin Powder 1 Application(s) Topical two times a day  rivaroxaban 15 milliGRAM(s) Oral two times a day with meals    PRN MEDICATIONS    VITALS:   T(F): 96.3  HR: 93  BP: 98/59  RR: 16  SpO2: 94%    LABS:                        11.3   7.41  )-----------( 223      ( 30 Nov 2019 21:56 )             34.2         PHYSICAL EXAM:  GENERAL: NAD, lying in bed comfortably  ENT: Moist mucous membranes  NECK: Supple, No JVD  CHEST/LUNG: Slightly diminished breath sounds at bases, no wheezing. PHEART: Irregular rate and rhythm, no murmurs.  ABDOMEN: Bowel sounds present; Soft, Nontender, Nondistended.  EXTREMITIES:  2+ Peripheral Pulses, brisk capillary refill. No clubbing, cyanosis, or edema  NERVOUS SYSTEM:  Alert & Oriented X1  SKIN: No rashes or lesions

## 2019-12-02 NOTE — PROGRESS NOTE ADULT - ASSESSMENT
# AMS 2/2 Septic shock secondary to C. Diff colitis   Patient must go 48 hours without a loose/watery stool or have formed stools to be considered non-contagious   GI PCR negative, C diff positive, stool cx negative   Patient tolerating DASH diet today   - c/w  fidaxomicin 200mg PO BID   will possibly transition patient to previous vancomycin regimen ([id number] 125 PO for 2 weeks, q12 for two weeks9 and q24 for two weeks e-->This is on the med-rec  - Dysphagia 1 as per Speech and Swallow and tolerating   - 1 watery bowel movement overnight  - Patient deconditioned- will need PT    # Labored breathing  - diminished breath sounds at the bases today, + LE edema  - patient appears overloaded  - will give 40 mg of Lasix 40 mg IV  - saturating 92% on room air, will start NC as per attending    # LARS likely prerenal secondary to septic shock  - Resolved   - Cr. 0.7    #Hypoalbuminemia   Likely attributable to patient's poor PO intake.  Slightly improved to 1.9 yesterday.   c/w DASH diet w/ensure-->PO intake encouraged.     # Mild elevation in troponin likely type II demand ischemia, stable   - asymptomatic   - EKG shows afib  - echo unremarkable    # Atrial fibrillation. Rate controlled with Digoxin and Cardizem.   Rate controlled established with digoxin .125mg PO daily-->Has been trending in the 90s with a couple of reads in the 100s-110s.   - continue w Cardizem 120 CD starting tomorrow.. Patient may go home with 25mg PO bid metoprolol-->Patient's BP may not be able to tolerate this-->Please d/w attending in regards to which meds she is being discharged with for her a-fib and HTN    # Acute DVT- Of right internal jugular and subclavian DVT- on therapeutic Xarelto dose.  - c/w xarelto 15mg PO bid twice a day for 21 days (Day 14)-->Will transition to 20mg PO daily there after as this is guidelines in terms treating DVT/PE. - Repeat US as outpatient     # HTN, resolved  -Patient previoushly on lisinopril 20mg PO daily, HCTZ 12.5 PO daily, and metoprolol 50mg PO BID--  >Was not treated with Cardizem as of 1/2019 (talked to her PCP.)  c/w Cardizem 120  When d/c plan-->D/w attending as to which anti-HTN meds she is to be discharged-->Cardizem or metoprolol-->for A-fib rate control as well.     Diet: DASH  GI ppx: N/A  DVT ppx: Xarelto  Activity: Increase as tolerated, unable to walk with PT   Code status: Full Code (  ) / DNR ( X ) / DNI ( X )  Disposition: from home # AMS 2/2 Septic shock secondary to C. Diff colitis   Patient must go 48 hours without a loose/watery stool or have formed stools to be considered non-contagious   GI PCR negative, C diff positive, stool cx negative   Patient tolerating DASH diet today   - c/w  fidaxomicin 200mg PO BID   will possibly transition patient to previous vancomycin regimen ([id number] 125 PO for 2 weeks, q12 for two weeks9 and q24 for two weeks e-->This is on the med-rec  - Dysphagia 1 as per Speech and Swallow and tolerating   - 1 watery bowel movement overnight  - Patient deconditioned- will need PT    # Labored breathing  - diminished breath sounds at the bases today, + LE edema  - patient appears overloaded  - will give 40 mg of Lasix 40 mg IV  - saturating 92% on room air, will start NC as per attending    # LARS likely prerenal secondary to septic shock  - Resolved   - Cr. 0.7    #Hypoalbuminemia   Likely attributable to patient's poor PO intake.  Slightly improved to 1.9 yesterday.   c/w DASH diet w/ensure-->PO intake encouraged.     # Mild elevation in troponin likely type II demand ischemia, stable   - asymptomatic   - EKG shows afib  - echo unremarkable    # Atrial fibrillation. Rate controlled with Digoxin and Cardizem.   Rate controlled established with digoxin .125mg PO daily-->Has been trending in the 90s with a couple of reads in the 100s-110s.   - continue w Cardizem 120 CD starting tomorrow.. Patient may go home with 25mg PO bid metoprolol-->Patient's BP may not be able to tolerate this-->Please d/w attending in regards to which meds she is being discharged with for her a-fib and HTN    # Acute DVT- Of right internal jugular and subclavian DVT- on therapeutic Xarelto dose.  - c/w xarelto 15mg PO bid twice a day for 21 days (Day 14)-->Will transition to 20mg PO daily there after as this is guidelines in terms treating DVT/PE. - Repeat US as outpatient     # HTN, resolved  -Patient previously on lisinopril 20mg PO daily, HCTZ 12.5 PO daily, and metoprolol 50mg PO BID--  c/w Cardizem 120  When d/c plan-->D/w attending as to which anti-HTN meds she is to be discharged-->Cardizem or metoprolol-->for A-fib rate control as well.     Diet: DASH  GI ppx: N/A  DVT ppx: Xarelto  Activity: Increase as tolerated, unable to walk with PT   Code status: Full Code (  ) / DNR ( X ) / DNI ( X )  Disposition: from home

## 2019-12-02 NOTE — PROGRESS NOTE ADULT - ATTENDING COMMENTS
I saw and evaluated patient  by bedside, no diarrhea , tolerating diet well.  remains pleasantly confused, patient is sitting in chair today.  All labs, radiology studies, VS was reviewed  I have reviewed the resident's note and agree with documented findings and  plan of care.    C.Diff diarrhea- resolved with  dificille tx., continue fidaxomicin tx  h/o HTN- added on lasix tx.   h/o persistent afib- on diltiazem,digoxin, xarelto tx.  Acute DVT of right internal jugular and subclavian DVT- on therap. Xarelto dose.  Poor appetite- appetite has improved post initiation of Megace tx.   Hypomagnesemia - supplemented  physical deconditioning, poor functional status- patient is a good candidate for SNF, daughter is refusing for pt. to be d/c to SNF.  Family Discussion: patient will benefit from SNF placement, she needs 2 people assistance with daily living activities Disposition:  Home__ with caregiver 24 care vs. SNF . f/up with  for assistance of patient's d/c plan.

## 2019-12-03 NOTE — PROGRESS NOTE ADULT - ASSESSMENT
# AMS 2/2 Septic shock secondary to C. Diff colitis   Patient must go 48 hours without a loose/watery stool or have formed stools to be considered non-contagious   GI PCR negative, C diff positive, stool cx negative   Patient tolerating DASH diet today   - c/w  fidaxomicin 200mg PO BID   will possibly transition patient to previous vancomycin regimen ([id number] 125 PO for 2 weeks, q12 for two weeks9 and q24 for two weeks e-->  -This is on the med-rec  - Dysphagia 1 as per Speech and Swallow   - 1 bowel movement overnight  - Patient deconditioned- will need PT    # Labored breathing - improved  - diminished breath sounds at the bases improved  - will give Lasix 20 mg po oral  - saturating well on room air    # LARS likely prerenal secondary to septic shock  - Resolved   - Cr. 0.7    #Hypoalbuminemia   Likely attributable to patient's poor PO intake.  Slightly improved to 1.9 yesterday.   c/w DASH diet w/ensure-->PO intake encouraged.     # Mild elevation in troponin likely type II demand ischemia, stable   - asymptomatic   - EKG shows afib  - echo unremarkable    # Atrial fibrillation. Rate controlled with Digoxin and Cardizem.   Rate controlled established with digoxin .125mg PO daily-->Has been trending in the 90s with a couple of reads in the 100s-110s.   - continue w Cardizem 120 CD starting   - Patient may go home with 25mg PO bid metoprolol-->Patient's BP may not be able to tolerate this-->Please d/w attending in regards to which meds she is being discharged with for her a-fib and HTN    # Acute DVT- Of right internal jugular and subclavian DVT- on therapeutic Xarelto dose.  - c/w xarelto 15mg PO bid twice a day for 21 days (Day 15)-->Will transition to 20mg PO daily there after as this is guidelines in terms treating DVT/PE. - Repeat US as outpatient     # HTN, resolved  -Patient previously on lisinopril 20mg PO daily, HCTZ 12.5 PO daily, and metoprolol 50mg PO BID--  c/w Cardizem 120  When d/c plan-->D/w attending as to which anti-HTN meds she is to be discharged-->Cardizem or metoprolol-->for A-fib rate control as well.     Diet: DASH  GI ppx: N/A  DVT ppx: Xarelto  Activity: Increase as tolerated, unable to walk with PT

## 2019-12-03 NOTE — PROGRESS NOTE ADULT - SUBJECTIVE AND OBJECTIVE BOX
SUBJECTIVE:     Today is hospital day 29d. This morning she is resting comfortably in bed and reports no new issues or overnight events.     PAST MEDICAL & SURGICAL HISTORY  Dyslipidemia  Hypertension  Atrial fibrillation  H/O abdominal hysterectomy  History of cholecystectomy    SOCIAL HISTORY:  Negative for smoking/alcohol/drug use.     ALLERGIES:  penicillin (Rash)    MEDICATIONS:  STANDING MEDICATIONS  chlorhexidine 4% Liquid 1 Application(s) Topical daily  digoxin     Tablet 0.125 milliGRAM(s) Oral daily  diltiazem    milliGRAM(s) Oral daily  fidaxomicin 200 milliGRAM(s) Oral two times a day  furosemide    Tablet 20 milliGRAM(s) Oral daily  magnesium oxide 400 milliGRAM(s) Oral three times a day with meals  megestrol 400 milliGRAM(s) Oral daily  multivitamin/minerals 1 Tablet(s) Oral daily  nystatin Powder 1 Application(s) Topical two times a day  rivaroxaban 15 milliGRAM(s) Oral two times a day with meals    PRN MEDICATIONS    VITALS:   T(F): 97.4  HR: 103  BP: 123/61  RR: 14  SpO2: --      PHYSICAL EXAM:  GENERAL: NAD, lying in bed comfortably  ENT: Moist mucous membranes  NECK: Supple, No JVD  CHEST/LUNG: Slightly diminished breath sounds at bases, no wheezing. PHEART: Irregular rate and rhythm, no murmurs.  ABDOMEN: Bowel sounds present; Soft, Nontender, Nondistended.  EXTREMITIES:  2+ Peripheral Pulses, brisk capillary refill. Minimal LE edema  NERVOUS SYSTEM:  Alert & Oriented X1  SKIN: No rashes or lesions

## 2019-12-04 NOTE — PROGRESS NOTE ADULT - NSHPATTENDINGPLANDISCUSS_GEN_ALL_CORE
house staff

## 2019-12-04 NOTE — PROGRESS NOTE ADULT - ASSESSMENT
# AMS 2/2 Septic shock secondary to C. Diff colitis   Patient must go 48 hours without a loose/watery stool or have formed stools to be considered non-contagious   GI PCR negative, C diff positive, stool cx negative   Patient tolerating DASH diet today   - c/w  fidaxomicin 200mg PO BID   will possibly transition patient to previous vancomycin regimen ([id number] 125 PO for 2 weeks, q12 for two weeks9 and q24 for two weeks e-->  - This is on the med-rec  - Dysphagia 1 as per Speech and Swallow   - Patient deconditioned- needs PT    # Labored breathing - improved  - diminished breath sounds at the bases improved  - will give Lasix 20 mg po oral  - saturating well on room air    # LARS likely prerenal secondary to septic shock  - Resolved   - Cr. 0.7    #Hypoalbuminemia   Likely attributable to patient's poor PO intake.  Slightly improved to 1.9 yesterday.   c/w DASH diet w/ensure-->PO intake encouraged.     # Mild elevation in troponin likely type II demand ischemia, stable   - asymptomatic   - EKG shows afib  - echo unremarkable    # Atrial fibrillation. Rate controlled with Digoxin and Cardizem.   Rate controlled established with digoxin .125mg PO daily-->Has been trending in the 90s with a couple of reads in the 100s-110s.   - continue w Cardizem 120 CD starting   - Patient may go home with 25mg PO bid metoprolol-->Patient's BP may not be able to tolerate this-->Please d/w attending in regards to which meds she is being discharged with for her a-fib and HTN    # Acute DVT- Of right internal jugular and subclavian DVT- on therapeutic Xarelto dose.  - c/w xarelto 15mg PO bid twice a day for 21 days (Day 16)-  ->Will transition to 20mg PO daily there after as this is guidelines in terms treating DVT/PE.   - Repeat US as outpatient     # HTN, resolved  -Patient previously on lisinopril 20mg PO daily, HCTZ 12.5 PO daily, and metoprolol 50mg PO BID--  c/w Cardizem 120  When d/c plan-->D/w attending as to which anti-HTN meds she is to be discharged-->Cardizem or metoprolol-->for A-fib rate control as well.     Diet: DASH  GI ppx: N/A  DVT ppx: Xarelto  Activity: Increase as tolerated, unable to walk with PT

## 2019-12-04 NOTE — PROGRESS NOTE ADULT - SUBJECTIVE AND OBJECTIVE BOX
SUBJECTIVE:     Today is hospital day 30d. This morning she is resting comfortably in bed and reports no new issues or overnight events.     PAST MEDICAL & SURGICAL HISTORY  Dyslipidemia  Hypertension  Atrial fibrillation  H/O abdominal hysterectomy  History of cholecystectomy    SOCIAL HISTORY:  Negative for smoking/alcohol/drug use.     ALLERGIES:  penicillin (Rash)    MEDICATIONS:  STANDING MEDICATIONS  chlorhexidine 4% Liquid 1 Application(s) Topical daily  digoxin     Tablet 0.125 milliGRAM(s) Oral daily  diltiazem    milliGRAM(s) Oral daily  fidaxomicin 200 milliGRAM(s) Oral two times a day  furosemide    Tablet 20 milliGRAM(s) Oral daily  magnesium oxide 400 milliGRAM(s) Oral three times a day with meals  megestrol 400 milliGRAM(s) Oral daily  multivitamin/minerals 1 Tablet(s) Oral daily  nystatin Powder 1 Application(s) Topical two times a day  rivaroxaban 15 milliGRAM(s) Oral two times a day with meals    PRN MEDICATIONS    VITALS:   T(F): 96.8  HR: 87  BP: 121/64  RR: 18  SpO2: --      PHYSICAL EXAM:  PHYSICAL EXAM:  GENERAL: NAD, lying in bed comfortably  HEAD:  Atraumatic, Normocephalic  EYES: EOMI, PERRLA, conjunctiva and sclera clear  ENT: Moist mucous membranes  NECK: Supple, No JVD  CHEST/LUNG: Clear to auscultation bilaterally; No rales, rhonchi, wheezing, or rubs. Unlabored respirations  HEART: Regular rate and rhythm; No murmurs, rubs, or gallops  ABDOMEN: Bowel sounds present; Soft, Nontender, Nondistended. No hepatomegally  EXTREMITIES:  2+ Peripheral Pulses, brisk capillary refill. No clubbing, cyanosis, or edema  NERVOUS SYSTEM:  Alert & Oriented X3, speech clear. No deficits   MSK: FROM all 4 extremities, full and equal strength  SKIN: No rashes or lesions SUBJECTIVE:     Today is hospital day 30d. This morning she is resting comfortably in bed and reports no new issues or overnight events.     PAST MEDICAL & SURGICAL HISTORY  Dyslipidemia  Hypertension  Atrial fibrillation  H/O abdominal hysterectomy  History of cholecystectomy    SOCIAL HISTORY:  Negative for smoking/alcohol/drug use.     ALLERGIES:  penicillin (Rash)    MEDICATIONS:  STANDING MEDICATIONS  chlorhexidine 4% Liquid 1 Application(s) Topical daily  digoxin     Tablet 0.125 milliGRAM(s) Oral daily  diltiazem    milliGRAM(s) Oral daily  fidaxomicin 200 milliGRAM(s) Oral two times a day  furosemide    Tablet 20 milliGRAM(s) Oral daily  magnesium oxide 400 milliGRAM(s) Oral three times a day with meals  megestrol 400 milliGRAM(s) Oral daily  multivitamin/minerals 1 Tablet(s) Oral daily  nystatin Powder 1 Application(s) Topical two times a day  rivaroxaban 15 milliGRAM(s) Oral two times a day with meals    PRN MEDICATIONS    VITALS:   T(F): 96.8  HR: 87  BP: 121/64  RR: 18  SpO2: --      PHYSICAL EXAM:  GENERAL: NAD, lying in bed comfortably  ENT: Moist mucous membranes  NECK: Supple, No JVD  CHEST/LUNG: Slightly diminished breath sounds at bases, no wheezing. PHEART: Irregular rate and rhythm, no murmurs.  ABDOMEN: Bowel sounds present; Soft, Nontender, Nondistended.  EXTREMITIES:  2+ Peripheral Pulses, brisk capillary refill. Minimal LE edema  NERVOUS SYSTEM:  Alert & Oriented X1  SKIN: No rashes or lesions

## 2019-12-04 NOTE — PROGRESS NOTE ADULT - ATTENDING COMMENTS
Pt seen and examined at bedside. Pt somnolent, responds saying "I'm ok". ROS otherwise unable to be obtained    PHYSICAL EXAM:  GENERAL: NAD, speaks in full sentences, no signs of respiratory distress  HEAD:  Atraumatic, Normocephalic  EYES: Anicteric  NECK: Supple, No JVD  CHEST/LUNG: Clear to auscultation bilaterally; No wheeze; No crackles; No accessory muscles used  HEART: Regular rate and rhythm; No murmurs;   ABDOMEN: Soft, Nontender, Nondistended; Bowel sounds present; No guarding  EXTREMITIES:  No cyanosis or edema  PSYCH: AAOx0  NEUROLOGY: non-focal  SKIN: No rashes or lesions    C.Diff diarrhea  - resolved   - now has "pasty" stools  - as per IDpo Fidaxomicin 200 mg q12h for 10 days (ends 12/8) and then change to po vancomycin 125 mg q6h for 2 weeks then q12h for 2 more weeks, then 125 mg q24h for 2 more weeks      HTN  - controlled  - c/w cardizem, lasix    Persistent afib  - on diltiazem, digoxin, xarelto     Acute DVT of right internal jugular and subclavian DVT  - on therap. Xarelto dose.    Poor appetite  - as per nursing staff appetite has improved post initiation of Megace     Hypomagnesemia - no assessed since 11/30  - repeat level AM    #Progress Note Handoff:  Pending (specify):  Consults_________, Tests________, Test Results_______, Other_________  Family discussion: Discussed d/c plan with daughter Emmy via phone. She placed me on hold several times to attend to personal matters. She is insisting on home care for mom and insists that medicare will pay for this. I stated I was informed she wanted to discuss if SNF/pt was necessary for patient. After reading pt notes I agree she would benefit from SNF but pt not interested in conversation. She just continued to rehash negative past experience.   Disposition: Home___/SNF___/Other________/Unknown at this time___x_____

## 2019-12-05 NOTE — CHART NOTE - NSCHARTNOTEFT_GEN_A_CORE
I called patient's daughter Emmy to notify her the intent for patient's discharge home tomorrow, 12/6/19, as long as the home health aid is reinstated. I answered all the questions that Emmy had to her satisfaction and she agreed to the plan for discharge tomorrow without any objections
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
## 2019-12-05 NOTE — PROGRESS NOTE ADULT - SUBJECTIVE AND OBJECTIVE BOX
SUBJECTIVE:    Patient is a 89y old Female who presents with a chief complaint of Altered mental status (04 Dec 2019 11:09)    Currently admitted to medicine with the primary diagnosis of Clostridium difficile colitis     Today is hospital day 31d. This morning she is resting comfortably in bed and reports no new issues or overnight events.     INTERVAL EVENTS: 1 pasty BM this AM reported by RN (same as previous days). Daughter might be more receptive to SNF    PAST MEDICAL & SURGICAL HISTORY  Dyslipidemia  Hypertension  Atrial fibrillation  H/O abdominal hysterectomy  History of cholecystectomy    ALLERGIES:  penicillin (Rash)    MEDICATIONS:  STANDING MEDICATIONS  chlorhexidine 4% Liquid 1 Application(s) Topical daily  digoxin     Tablet 0.125 milliGRAM(s) Oral daily  diltiazem    milliGRAM(s) Oral daily  fidaxomicin 200 milliGRAM(s) Oral two times a day  furosemide    Tablet 20 milliGRAM(s) Oral daily  megestrol 400 milliGRAM(s) Oral daily  multivitamin/minerals 1 Tablet(s) Oral daily  nystatin Powder 1 Application(s) Topical two times a day  rivaroxaban 15 milliGRAM(s) Oral two times a day with meals    PRN MEDICATIONS    VITALS:   T(F): 97.3  HR: 91  BP: 148/70  RR: 18  SpO2: --    LABS:                        10.5   4.44  )-----------( 184      ( 05 Dec 2019 06:10 )             33.3     12-05    141  |  99  |  16  ----------------------------<  101<H>  4.4   |  33<H>  |  0.7    Ca    8.1<L>      05 Dec 2019 06:10  Mg     1.9     12-05    TPro  5.1<L>  /  Alb  2.3<L>  /  TBili  0.5  /  DBili  x   /  AST  24  /  ALT  15  /  AlkPhos  73  12-05    RADIOLOGY:    PHYSICAL EXAM:  GENERAL: NAD, lying in bed comfortably  CHEST/LUNG: Bibasilar slightly diminished breath sounds, no wheezing.   CVS: Irregular rate and rhythm, no murmurs appreciated  ABDOMEN: Bowel sounds present; Soft, Nontender, Nondistended.  EXTREMITIES:  2+ Peripheral Pulses, trace LE edema  NERVOUS SYSTEM:  AAOX2
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
## 2019-12-05 NOTE — DISCHARGE NOTE NURSING/CASE MANAGEMENT/SOCIAL WORK - PATIENT PORTAL LINK FT
You can access the FollowMyHealth Patient Portal offered by [facility] by registering at the following website: [email or web address]. By joining [employer]’s FollowMyHealth portal, you will also be able to view your health information using other applications (apps) compatible with our system.

## 2019-12-06 NOTE — PROGRESS NOTE ADULT - SUBJECTIVE AND OBJECTIVE BOX
SUBJECTIVE:    Patient is a 89y old Female who presents with a chief complaint of Altered mental status (05 Dec 2019 09:02)    Currently admitted to medicine with the primary diagnosis of Clostridium difficile colitis     Today is hospital day 32d. This morning she is resting comfortably in bed and reports no new issues or overnight events.     INTERVAL EVENTS: Daughter was notified of anticipation of discharge yesterday. Pending health aid agency reinstatement. Possibly by Monday.     PAST MEDICAL & SURGICAL HISTORY  Dyslipidemia  Hypertension  Atrial fibrillation  H/O abdominal hysterectomy  History of cholecystectomy      ALLERGIES:  penicillin (Rash)    MEDICATIONS:  STANDING MEDICATIONS  chlorhexidine 4% Liquid 1 Application(s) Topical daily  digoxin     Tablet 0.125 milliGRAM(s) Oral daily  diltiazem    milliGRAM(s) Oral daily  fidaxomicin 200 milliGRAM(s) Oral two times a day  furosemide    Tablet 20 milliGRAM(s) Oral daily  multivitamin/minerals 1 Tablet(s) Oral daily  nystatin Powder 1 Application(s) Topical two times a day  rivaroxaban 15 milliGRAM(s) Oral two times a day with meals    PRN MEDICATIONS    VITALS:   T(F): 98  HR: 70  BP: 136/58  RR: 19  SpO2: --    LABS:                        10.5   4.44  )-----------( 184      ( 05 Dec 2019 06:10 )             33.3     12-05    141  |  99  |  16  ----------------------------<  101<H>  4.4   |  33<H>  |  0.7    Ca    8.1<L>      05 Dec 2019 06:10  Mg     1.9     12-05    TPro  5.1<L>  /  Alb  2.3<L>  /  TBili  0.5  /  DBili  x   /  AST  24  /  ALT  15  /  AlkPhos  73  12-05    RADIOLOGY:    PHYSICAL EXAM:  GENERAL: NAD, lying in bed comfortably  CHEST/LUNG: Bibasilar slightly diminished breath sounds, no wheezing.   CVS: Irregular rate and rhythm, no murmurs appreciated  ABDOMEN: Bowel sounds present; Soft, Nontender, Nondistended.  EXTREMITIES:  2+ Peripheral Pulses, trace LE edema  NERVOUS SYSTEM:  AAOX2

## 2019-12-06 NOTE — PROGRESS NOTE ADULT - REASON FOR ADMISSION
Altered mental status
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
## 2019-12-18 NOTE — DISCHARGE NOTE ADULT - CARE PROVIDER_API CALL
Malik Santiago), [address] [address]  [address] [facility]  [address]  [place] [address]  Phone: [phone]  Fax: [phone] No

## 2020-01-01 ENCOUNTER — INPATIENT (INPATIENT)
Facility: HOSPITAL | Age: 85
LOS: 0 days | End: 2020-04-10
Attending: INTERNAL MEDICINE | Admitting: INTERNAL MEDICINE
Payer: MEDICARE

## 2020-01-01 ENCOUNTER — EMERGENCY (EMERGENCY)
Facility: HOSPITAL | Age: 85
LOS: 0 days | Discharge: HOME | End: 2020-04-10
Admitting: INTERNAL MEDICINE

## 2020-01-01 VITALS — TEMPERATURE: 98 F | OXYGEN SATURATION: 89 % | HEART RATE: 76 BPM

## 2020-01-01 DIAGNOSIS — R06.00 DYSPNEA, UNSPECIFIED: ICD-10-CM

## 2020-01-01 DIAGNOSIS — I48.91 UNSPECIFIED ATRIAL FIBRILLATION: ICD-10-CM

## 2020-01-01 DIAGNOSIS — E78.5 HYPERLIPIDEMIA, UNSPECIFIED: ICD-10-CM

## 2020-01-01 DIAGNOSIS — R41.82 ALTERED MENTAL STATUS, UNSPECIFIED: ICD-10-CM

## 2020-01-01 DIAGNOSIS — Z66 DO NOT RESUSCITATE: ICD-10-CM

## 2020-01-01 DIAGNOSIS — Z90.710 ACQUIRED ABSENCE OF BOTH CERVIX AND UTERUS: Chronic | ICD-10-CM

## 2020-01-01 DIAGNOSIS — I10 ESSENTIAL (PRIMARY) HYPERTENSION: ICD-10-CM

## 2020-01-01 DIAGNOSIS — R09.02 HYPOXEMIA: ICD-10-CM

## 2020-01-01 DIAGNOSIS — F03.90 UNSPECIFIED DEMENTIA WITHOUT BEHAVIORAL DISTURBANCE: ICD-10-CM

## 2020-01-01 DIAGNOSIS — Z90.49 ACQUIRED ABSENCE OF OTHER SPECIFIED PARTS OF DIGESTIVE TRACT: Chronic | ICD-10-CM

## 2020-01-01 DIAGNOSIS — Z88.0 ALLERGY STATUS TO PENICILLIN: ICD-10-CM

## 2020-01-01 DIAGNOSIS — R68.89 OTHER GENERAL SYMPTOMS AND SIGNS: ICD-10-CM

## 2020-01-01 DIAGNOSIS — G93.40 ENCEPHALOPATHY, UNSPECIFIED: ICD-10-CM

## 2020-01-01 LAB
ACANTHOCYTES BLD QL SMEAR: SLIGHT — SIGNIFICANT CHANGE UP
ALBUMIN SERPL ELPH-MCNC: 2.4 G/DL — LOW (ref 3.5–5.2)
ALP SERPL-CCNC: 51 U/L — SIGNIFICANT CHANGE UP (ref 30–115)
ALT FLD-CCNC: 72 U/L — HIGH (ref 0–41)
ANION GAP SERPL CALC-SCNC: 14 MMOL/L — SIGNIFICANT CHANGE UP (ref 7–14)
AST SERPL-CCNC: 145 U/L — HIGH (ref 0–41)
BASOPHILS # BLD AUTO: 0 K/UL — SIGNIFICANT CHANGE UP (ref 0–0.2)
BASOPHILS NFR BLD AUTO: 0 % — SIGNIFICANT CHANGE UP (ref 0–1)
BILIRUB SERPL-MCNC: 1 MG/DL — SIGNIFICANT CHANGE UP (ref 0.2–1.2)
BUN SERPL-MCNC: 129 MG/DL — CRITICAL HIGH (ref 10–20)
CALCIUM SERPL-MCNC: 8.6 MG/DL — SIGNIFICANT CHANGE UP (ref 8.5–10.1)
CHLORIDE SERPL-SCNC: 91 MMOL/L — LOW (ref 98–110)
CO2 SERPL-SCNC: 14 MMOL/L — LOW (ref 17–32)
CREAT SERPL-MCNC: 3.2 MG/DL — HIGH (ref 0.7–1.5)
DACRYOCYTES BLD QL SMEAR: SLIGHT — SIGNIFICANT CHANGE UP
EOSINOPHIL # BLD AUTO: 0 K/UL — SIGNIFICANT CHANGE UP (ref 0–0.7)
EOSINOPHIL NFR BLD AUTO: 0 % — SIGNIFICANT CHANGE UP (ref 0–8)
GIANT PLATELETS BLD QL SMEAR: PRESENT — SIGNIFICANT CHANGE UP
GLUCOSE SERPL-MCNC: 126 MG/DL — HIGH (ref 70–99)
HCT VFR BLD CALC: 47.4 % — HIGH (ref 37–47)
HGB BLD-MCNC: 16.2 G/DL — HIGH (ref 12–16)
LDH SERPL L TO P-CCNC: 2022 — HIGH (ref 50–242)
LYMPHOCYTES # BLD AUTO: 0.49 K/UL — LOW (ref 1.2–3.4)
LYMPHOCYTES # BLD AUTO: 1.7 % — LOW (ref 20.5–51.1)
MANUAL SMEAR VERIFICATION: SIGNIFICANT CHANGE UP
MCHC RBC-ENTMCNC: 30.4 PG — SIGNIFICANT CHANGE UP (ref 27–31)
MCHC RBC-ENTMCNC: 34.2 G/DL — SIGNIFICANT CHANGE UP (ref 32–37)
MCV RBC AUTO: 88.9 FL — SIGNIFICANT CHANGE UP (ref 81–99)
MONOCYTES # BLD AUTO: 1.27 K/UL — HIGH (ref 0.1–0.6)
MONOCYTES NFR BLD AUTO: 4.4 % — SIGNIFICANT CHANGE UP (ref 1.7–9.3)
NEUTROPHILS # BLD AUTO: 26.28 K/UL — HIGH (ref 1.4–6.5)
NEUTROPHILS NFR BLD AUTO: 83.5 % — HIGH (ref 42.2–75.2)
NEUTS BAND # BLD: 7.8 % — HIGH (ref 0–6)
OVALOCYTES BLD QL SMEAR: SLIGHT — SIGNIFICANT CHANGE UP
PLAT MORPH BLD: NORMAL — SIGNIFICANT CHANGE UP
PLATELET # BLD AUTO: 223 K/UL — SIGNIFICANT CHANGE UP (ref 130–400)
POIKILOCYTOSIS BLD QL AUTO: SIGNIFICANT CHANGE UP
POTASSIUM SERPL-MCNC: SIGNIFICANT CHANGE UP MMOL/L (ref 3.5–5)
POTASSIUM SERPL-SCNC: SIGNIFICANT CHANGE UP MMOL/L (ref 3.5–5)
PROT SERPL-MCNC: 7.4 G/DL — SIGNIFICANT CHANGE UP (ref 6–8)
RBC # BLD: 5.33 M/UL — SIGNIFICANT CHANGE UP (ref 4.2–5.4)
RBC # FLD: 19.2 % — HIGH (ref 11.5–14.5)
RBC BLD AUTO: SIGNIFICANT CHANGE UP
SCHISTOCYTES BLD QL AUTO: SIGNIFICANT CHANGE UP
SODIUM SERPL-SCNC: 119 MMOL/L — CRITICAL LOW (ref 135–146)
VARIANT LYMPHS # BLD: 2.6 % — SIGNIFICANT CHANGE UP (ref 0–5)
WBC # BLD: 28.78 K/UL — HIGH (ref 4.8–10.8)
WBC # FLD AUTO: 28.78 K/UL — HIGH (ref 4.8–10.8)

## 2020-01-01 PROCEDURE — 93010 ELECTROCARDIOGRAM REPORT: CPT

## 2020-01-01 PROCEDURE — 71045 X-RAY EXAM CHEST 1 VIEW: CPT | Mod: 26

## 2020-01-01 PROCEDURE — 99285 EMERGENCY DEPT VISIT HI MDM: CPT | Mod: CS,GC

## 2020-01-01 RX ORDER — VANCOMYCIN HCL 1 G
1 VIAL (EA) INTRAVENOUS
Qty: 14 | Refills: 0
Start: 2020-01-01 | End: 2020-01-01

## 2020-01-01 RX ORDER — MORPHINE SULFATE 50 MG/1
2 CAPSULE, EXTENDED RELEASE ORAL
Refills: 0 | Status: DISCONTINUED | OUTPATIENT
Start: 2020-01-01 | End: 2020-01-01

## 2020-01-01 RX ORDER — MORPHINE SULFATE 50 MG/1
4 CAPSULE, EXTENDED RELEASE ORAL ONCE
Refills: 0 | Status: DISCONTINUED | OUTPATIENT
Start: 2020-01-01 | End: 2020-01-01

## 2020-01-01 RX ADMIN — MORPHINE SULFATE 2 MILLIGRAM(S): 50 CAPSULE, EXTENDED RELEASE ORAL at 16:25

## 2020-01-01 RX ADMIN — MORPHINE SULFATE 4 MILLIGRAM(S): 50 CAPSULE, EXTENDED RELEASE ORAL at 18:22

## 2020-04-10 NOTE — ED PROVIDER NOTE - OBJECTIVE STATEMENT
89 y.o F w/ pmhx afib, htn, hld, dementia bibems for nonresponsiveness found by [facility]. EMS arrived on scene to find pt hypoxic and nonresponsive. No other history available at this time.

## 2020-04-10 NOTE — ED PROVIDER NOTE - CLINICAL SUMMARY MEDICAL DECISION MAKING FREE TEXT BOX
89 Y/O F PMHX AS DOCUMENTED, ADVANCED DEMENTIA, BROUHT TO ED WITH SOB AND LETHARGY. PT DNI/DNR. ALL DIAGNOSTIC TESTING REVIEWED. COVID SUSPECTED. PT  IN THE ED.

## 2020-04-10 NOTE — ED PROVIDER NOTE - PHYSICAL EXAMINATION
CONSTITUTIONAL: working to breath, minimally responsive.  SKIN: Warm dry, normal skin turgor  HEAD: NCAT  EYES: EOMI, PERRLA, no scleral icterus, conjunctiva pink  ENT: normal pharynx with no erythema or exudates  NECK: Supple; non tender. Full ROM.  CARD: RRR, no murmurs.  RESP: clear to ausculation b/l. No crackles or wheezing.  ABD: soft, non-tender, non-distended, no rebound or guarding.  EXT: Full ROM, no bony tenderness, no pedal edema, no calf tenderness  NEURO: normal motor. normal sensory. CN II-XII intact. Cerebellar testing normal. Normal gait.  PSYCH: Cooperative, appropriate. CONSTITUTIONAL: working to breath, minimally responsive.  SKIN: Warm dry, normal skin turgor  HEAD: NCAT  EYES: EOMI, PERRLA, no scleral icterus, conjunctiva pink  ENT: normal pharynx with no erythema or exudates  NECK: Supple; non tender. Full ROM.  CARD: irregular, no murmurs.  RESP: clear to ausculation b/l. No crackles or wheezing. Tachypneic.  ABD: soft, non-tender, non-distended, no rebound or guarding.  EXT: no pedal edema, no calf tenderness  NEURO: following commands, able to nod yes or no to questions. A&O x1.  PSYCH: Cooperative, appropriate.

## 2020-04-10 NOTE — ED ADULT TRIAGE NOTE - CHIEF COMPLAINT QUOTE
patient brought directly to crit for low oxygen saturation on NRB mask and unable to obtain blood pressure, pt critical
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
## 2020-04-10 NOTE — ED PROVIDER NOTE - CARE PLAN
Principal Discharge DX:	Encephalopathy acute  Secondary Diagnosis:	Dyspnea  Secondary Diagnosis:	Suspected COVID-19 virus infection

## 2020-04-10 NOTE — ED PROVIDER NOTE - PROGRESS NOTE DETAILS
BI: Called family. Daughter in law, Tung, picked up but is not aware of the pt's status or history. Left voicemail to two other phone numbers. Per old discharge note for pt's admissions Nov/Dec 2019, pt is DNR/DNI. BI: Pt responding to questions. Verbalized that she does not want to be ventilated and does not want CPR and does not want comfort measures. Emmy, pt's daughter called back for further clarification and to her knowledge, her mother was DNR/DNI but she will find out from other family and call back. Agreed with daughter that pt will be kept DNR/DNI based on pt, based on previous note, based on daughter's understanding of pt's wishes. If that status were to change for whatever reason, the daughter will call back to my direct phone number. BI: d/w Daughter Emmy, [phone] who called back and agrees that we should respect pts wishes to keep DNR/DNI no comfort measures. Will keep family updated. BI: called to bedside as pt not breathing. No pulse, no heart sounds, pupils dilated and fixed, not reactive to light. Pt pronounced  at 7pm 4/10/20.

## 2020-04-10 NOTE — ED ADULT NURSE NOTE - OBJECTIVE STATEMENT
Pt. brought to ED for difficulty breathing and not eating. Pt. desating on NRB. Family called about code status.

## 2020-05-26 ENCOUNTER — APPOINTMENT (OUTPATIENT)
Dept: CARDIOLOGY | Facility: CLINIC | Age: 85
End: 2020-05-26

## 2020-09-25 NOTE — ED ADULT NURSE NOTE - SUICIDE SCREENING QUESTION 1
-- DO NOT REPLY / DO NOT REPLY ALL --  -- Message is from the [facility]--    COVID-19 Universal Screening: Positive    General Patient Message      Reason for Call: PATIENT INFORMATION  INTERVIEWER  gabe heredia  CARE SITE  ACC  PATIENT INFO  Name: Adina Clement  Age: 34 Years  : 1985  Sex: Female  New Patient? No  CONTACT INFO   Phone1: [phone]  1 2  Relationship Type: Self  Paging Instructions  More Patient Demographics  REASON FOR CALL  Coronavirus (COVID-19) - Diagnosed or Suspected (A)  ASSESSMENT(S)  Assessment   Reports  Mental: Normal  Onset:  a week   Denies  PROTOCOLS  Coronavirus (COVID-19) - Diagnosed or Suspected (A)  Triage Level:  Emergent  Disposition:  GO TO ED NOW: You need to be seen in the Emergency Department. Go to the ER at __________ Hospital. Leave now. Drive carefully.  Next Step:  GO TO ED NOW: * You need to be seen in the Emergency Department. * Go to the ED at ___________ Hospital. * Leave now. Drive carefully.  Suggested Educational Content:  Care Advice for Coronavirus (COVID-19) - Diagnosed or Suspected (Adult) [STCC 2019 ACC Adult After Hours Coronavirus V6]   Reports  Initial Assessment Questions  Worst Symptom:  comes and goes chest pain on the right side, cough  Cough:  productive cough  Fever:  Low grade  Respiratory Status:  intermittent shortness of breath with exertion  High Risk Disease:  asthma  Other Symptoms:  chest tightness, nausea, diarrhea, leg numbness,  Go to ED Now  MODERATE difficulty breathing (e.g., speaks in phrases, SOB even at rest, pulse 100-120)   Denies   Provided Care Advice  Go to ED Now:  You need to be seen in the Emergency Department.  Go to the ED at ___________ Hospital.  Leave now. Drive carefully.  Note to Triager - Triage Nurse Should Notify Emergency Department (ED):  The triager should call ahead to the ED and inform them of patient's symptoms and suspected diagnosis of COVID-19.  Obtain and document the  patient / caller's mobile phone number. Either keep the patient on hold or call the patient back with instructions.  Reason: So that ED can make plans to prevent coronavirus spread to others in the hospital.  You should Go to the Emergency Department (ED):  You will need to go to a nearby ED.  Do not leave until I've called and talked with the ED. The ED may have special instructions on how best to get you there. I will call you back (or place you on hold).  Tell Emergency Department Personnel:  Tell the first healthcare worker you meet that you may have been exposed to COVID-19.  Tell them you have symptoms and have been sent for COVID-19 testing.  Cover Your Mouth and Nose, Wear a Mask:  Cover your mouth and nose with a disposable tissue (e.g., Kleenex, toilet paper, paper towel) or wash cloth.  Ask for a mask to wear over your mouth and nose.  Driving: Another adult should drive.  Call  If:  Severe difficulty breathing occurs  Lips or face turns blue  Confusion occurs.  Understands Care Advice:  Yes  Agrees With Care Advice:  No  Will Follow Care Advice:  No  Coronavirus (COVID-19) - Diagnosed or Suspected (P)  Suggested Educational Content:  Care Advice for Coronavirus (COVID-19) - Diagnosed or Suspected (Pediatric) [STCC 2019 ACC Peds After Hours Coronavirus V3]  Reports  Nothing Reported   Denies  Understands Care Advice:  No  Agrees With Care Advice:  No  Will Follow Care Advice:  No  HEALTH HISTORY  None  MOST RECENT ENCOUNTERS  No Recent Encounters Found  RECOMMENDATION  NURSE REVIEW  Nurse: gabe heredia  EMR LINKED PATIENT  TRIAGE    CDS NURSE  ACUITY Emergent Emergent    PHYSICIAN CONSULT  Consulted PCP:  NoConsulted On-Call Physician:  Pauly Next Steps:  Not Specified  CONSULT NOTES  No Phone Notes  OUTCOME  Not Specified  PATIENT ORIGINAL INTENDED ACTION  Did not know what to do  PATIENT FINAL INTENDED ACTION  Go to Hospital / ED  PATIENT DIRECTED TO  Not Specified  INSURANCE PLAN  Not  Specified  PROTOCOLS  Care Instructions Provided to Patient:  NoAppointment Was Scheduled:  No  ENCOUNTER FORWARDING  Encounter has not been forwarded  PHONE NOTES  09/25/2020 at 11:54 AM by gabe heredia  Patient is part of the covid program and patient was transferred by agent. Patient has a severe cough and some shortness of breath. Patient was at the ED last night and left AMA. Instructed patient it would be best to go to the ED. She declined. Instructed her on the risks of stay home and she stated she will go in if things get worse. Patient does not have an amg provider.  SECURED MESSAGES  No Messages      Caller Information       Type Contact Phone    09/25/2020 11:34 AM CDT Phone (Outgoing) Adina Clement (Self) [phone] (M)     Outbound call regarding [place]; Cough, shortness of breath, trouble completing a sentence due to breathing difficulty. Transfer to RN.          Alternative phone number:     Turnaround time given to caller:   \"This message will be sent to [state Provider's name]. The clinical team will fulfill your request as soon as they review your message.\"     Patient unable to complete

## 2021-07-01 NOTE — PATIENT PROFILE ADULT - NSTRANSFERBELONGINGSDISPO_GEN_A_NUR
The patient presents to the er today with complaints of mid chest pain that woke her up between 3-4 this morning. She reports of taking Pepcid without any improvement. She reports some shortness of breath as well. She reports some constipation for the past 2-3 days. She also reports of eating onion rings yesterday which is unusual. Family is at the bedside.                 Danita Mann RN  07/01/21 9549 not applicable

## 2021-08-11 NOTE — PHYSICAL THERAPY INITIAL EVALUATION ADULT - PERTINENT HX OF CURRENT PROBLEM, REHAB EVAL
This is a 89 year old female who presented with a cc/o weakness and diarrhea x2 days. She has a significant PMHx of HTN, HLD, and Paroxysmal Atrial Fibrilaltion (not on AC). She was brought in via EMS, called by home aid, after she was found in bed after an episode of diarrhea
Not applicable as gestational age is greater than or equal to 34 weeks.

## 2021-10-06 PROBLEM — I10 ESSENTIAL HYPERTENSION: Status: ACTIVE | Noted: 2019-01-01

## 2022-03-24 NOTE — ED PROVIDER NOTE - TOBACCO USE
Unknown if ever smoked Dupixent Counseling: I discussed with the patient the risks of dupilumab including but not limited to eye infection and irritation, cold sores, injection site reactions, worsening of asthma, allergic reactions and increased risk of parasitic infection.  Live vaccines should be avoided while taking dupilumab. Dupilumab will also interact with certain medications such as warfarin and cyclosporine. The patient understands that monitoring is required and they must alert us or the primary physician if symptoms of infection or other concerning signs are noted.

## 2023-10-02 NOTE — ED PROVIDER NOTE - MEDICAL DECISION MAKING DETAILS
Patient presented to ED for diarrhea/abdominal pain, patient was signed out to me by Dr. Arriaga, as per sign out, CT results noted, and patient is admitted to Medicine. A[place]ief Pregnancy And Lactation Text: It is unknown if this medication is safe to use during pregnancy.  It is unknown if this medication is excreted in breast milk.  Breastfeeding women should use the topical cream on the smallest area of the skin for the shortest time needed while breastfeeding.  Do not apply to nipple and areola.

## 2024-04-02 NOTE — PROGRESS NOTE ADULT - SUBJECTIVE AND OBJECTIVE BOX
"Subjective   Chloe Ayoub is a 74 y.o. female       Chief Complaint    Follow-up          HPI     Review of Systems   All other systems reviewed and are negative.     Patient returns in follow-up of problems as noted.  She is doing well.  She denies angina CHF or arrhythmia symptomatology.  Auscultation demonstrates an irregular rhythm consistent with atrial flutter and a controlled ventricular response.  Because of all the above we believe she is doing well and continued therapy as before is recommended.  She was educated regarding symptoms to watch for and encouraged to call if they arise.  We did advocate the merits of diet and weight loss.    Vitals:    04/02/24 0833 04/02/24 0856   BP: 144/88 130/82   BP Location: Left arm Left arm   Patient Position: Sitting Sitting   Pulse: 64    Weight: 135 kg (297 lb)    Height: 1.702 m (5' 7\")         Objective   Physical Exam  Constitutional:       Appearance: Normal appearance.   HENT:      Nose: Nose normal.   Neck:      Vascular: No carotid bruit.   Cardiovascular:      Rate and Rhythm: Normal rate.      Pulses: Normal pulses.      Heart sounds: Normal heart sounds.   Pulmonary:      Effort: Pulmonary effort is normal.   Abdominal:      General: Bowel sounds are normal.      Palpations: Abdomen is soft.   Musculoskeletal:         General: Normal range of motion.      Cervical back: Normal range of motion.      Right lower leg: No edema.      Left lower leg: No edema.   Skin:     General: Skin is warm and dry.   Neurological:      General: No focal deficit present.      Mental Status: She is alert.   Psychiatric:         Mood and Affect: Mood normal.         Behavior: Behavior normal.         Thought Content: Thought content normal.         Judgment: Judgment normal.         Allergies  Citalopram, Diclofenac, Diltiazem, Hydromorphone, Lorazepam, Meclizine, Morphine, Promethazine, and Sertraline     Current Medications    Current Outpatient Medications:     Eliquis 5 " SUBJECTIVE:    Patient is a 89y old  Female who presents with a chief complaint of Altered mental status (12 Nov 2019 15:13)    Currently admitted to medicine with the primary diagnosis of Colitis     Today is hospital day 8d.     Patient was seen at bedside this AM. Patient had a loose watery BM this morning.  No acute overnight events.  Patient is hemodynamically stable and is non-toxic in appearance.     PAST MEDICAL & SURGICAL HISTORY  PAST MEDICAL & SURGICAL HISTORY:  Dyslipidemia  Hypertension  Atrial fibrillation  H/O abdominal hysterectomy  History of cholecystectomy      ALLERGIES:  penicillin (Rash)    MEDICATIONS:  STANDING MEDICATIONS  chlorhexidine 4% Liquid 1 Application(s) Topical daily  diltiazem    milliGRAM(s) Oral every 24 hours  lactated ringers. 1000 milliLiter(s) IV Continuous <Continuous>  rivaroxaban 10 milliGRAM(s) Oral with dinner  vancomycin    Solution 125 milliGRAM(s) Oral every 6 hours    PRN MEDICATIONS    VITALS:   T(F): 96.3  HR: 95  BP: 104/65  RR: 17  SpO2: --    LABS:                        12.5   7.32  )-----------( 159      ( 12 Nov 2019 07:34 )             39.1     11-12    141  |  105  |  17  ----------------------------<  92  4.3   |  27  |  0.8    Ca    7.5<L>      12 Nov 2019 07:34  Mg     1.6     11-12    TPro  4.7<L>  /  Alb  2.1<L>  /  TBili  0.3  /  DBili  x   /  AST  15  /  ALT  6   /  AlkPhos  50  11-12                  RADIOLOGY  < from: CT Abdomen and Pelvis No Cont (11.04.19 @ 20:49) >  IMPRESSION:       Diffuse circumferential thickening of the colon with mild pericolonic   inflammation, compatible with pancolitis. No intraperitoneal free air or   drainable collection.    < end of copied text >      PHYSICAL EXAM:  GEN: No acute distress.  Non-toxic and HD stable.   HEENT: NCAT  LUNGS: Clear to auscultation bilaterally   HEART: RRR. no murmurs  ABD: Soft, non-tender, non-distended.  No pain upon palpation on all four quadrants. +BS on all four quadrants.  No rebound tenderness, guarding on palpation on all four quadrants. No signs of peritonitis.   EXT: no edema  NEURO: AAOX2.      Assessment and Plan  89 yo F from senior resident w/ PMH of afib, HTN and DLD was brought in to hospital for altered mental status. The aid reported decreased appetite and frequent, watery bowel movements.     # AMS secondary to metabolic encephalopathy from septic shock  # Septic shock secondary to C. Diff colitis-->Patient is hemodynamically stable while recieving IVF.   WBC downtrending to a normal of 9 today   ID-->Continue 125mg PO vanc [id number] for two weeks, then 125mg PO vanc q12 for two weeks, and 125mg PO vanc q24 for two weeks   - CT noted, pan colitis, lactic acid trending down, leukocytosis  - c/w w/ pureed diet   - GI PCR negative, C diff positive, stool cx negative     # LARS likely prerenal secondary to septic shock, improved   # Sodium stable at 141 today.   - baseline Cr 0.7, admission Cr 7.7  - Creatinine at .8 today ([facility])   - c/w LR at 75 and PO intake   - accurate intake/output     # Mild elevation in troponin likely type II demand ischemia, stable   - asymptomatic   - EKG shows afib  - echo unremarkable    # Atrial fibrillation  - resume cardizem at 30mg q6h with parameter   - resume AC w/xarelto 10mg PO daily   HR<110 over the course of the last day.  If persistently over >110-->Will increase cardizem to 60mg [id number] with parameters.     # HTN, resolved  -Patient previoushly on lisinopril and HCTZ-->Holding both as patient's BP is trending in the 90s-100s/60s today.  If it is elevated-->Will continue home BP meds.     Diet: pureed diet   GI ppx: N/A  DVT ppx: Xarelto  Activity: Increase as tolerated, pending PT/Rehab eval  Code status: Full Code (  ) / DNR ( X ) / DNI ( X )  Disposition: from home, discharge planning mg tablet, TAKE 1 TABLET BY MOUTH TWICE DAILY, Disp: 180 tablet, Rfl: 3    L. acidophilus/Bifid. animalis 32 billion cell capsule, Take 1 capsule by mouth once daily., Disp: , Rfl:     losartan (Cozaar) 50 mg tablet, Take 1 tablet (50 mg) by mouth once daily., Disp: , Rfl:     metoprolol tartrate (Lopressor) 50 mg tablet, Take 1 tablet by mouth every 12 hours., Disp: , Rfl:                      Assessment/Plan   1. Typical atrial flutter (CMS/HCC)  Atrial flutter persists but rate is controlled.    2. Benign essential hypertension  Adequate control on current therapy    3. Never smoked cigarettes  Noted          Scribe Attestation  By signing my name below, IBrittney LPN, Yvetteibluis angel   attest that this documentation has been prepared under the direction and in the presence of Michael Nguyen MD.     Provider Attestation - Scribe documentation    All medical record entries made by the Scribe were at my direction and personally dictated by me. I have reviewed the chart and agree that the record accurately reflects my personal performance of the history, physical exam, discussion and plan.

## 2024-09-05 NOTE — ED PROVIDER NOTE - EKG ADDITIONAL INFORMATION FREE TEXT
D new orders written for heavy duty Rollator with a seat attachment,  1. 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 Tian has a poor functional mobility deficit can be sufficiently resolved with the use of a (Rollator, Rolling Walker or Walker). Curlys mobility limitation significantly impairs their ability to participate in one of more activities of daily living. The use of a (Rollator, RW or Walker) will significantly improve her ability to participate in MRADLS and the patient will use it on regular basis in the home .  
A.fib at 93bpm

## 2024-10-24 NOTE — PATIENT PROFILE ADULT - OVER THE PAST TWO WEEKS HAVE YOU FELT DOWN, DEPRESSED OR HOPELESS?
[Normal Oropharynx] : the oropharynx was normal [Normal TMs] : both tympanic membranes were normal [No Edema] : there was no peripheral edema [Normal] : soft, non-tender, non-distended, no masses palpated, no HSM and normal bowel sounds [Normal Supraclavicular Nodes] : no supraclavicular lymphadenopathy [Normal Anterior Cervical Nodes] : no anterior cervical lymphadenopathy [de-identified] : no cobblestoning [de-identified] : well healed epigastric surgical scar [de-identified] : +scoliosis [de-identified] : Tightness of L elbow and L wrist joint no

## 2025-03-21 NOTE — PROGRESS NOTE ADULT - ASSESSMENT
Last OV 12/3/2024  Next OV 6/3/2025      Requested Prescriptions     Pending Prescriptions Disp Refills    cyclobenzaprine (FLEXERIL) 5 MG tablet [Pharmacy Med Name: Cyclobenzaprine HCl 5 MG Oral Tablet] 90 tablet 0     Sig: Take 1 tablet by mouth three times daily as needed for muscle spasm      89 year old lady DNI/DNR (per previous records), with history of paroxysmal afib, hypertension and DLD was brought in for altered mental status. Initially admitted to ICU for severe sepsis 2/2 colitis. Downgraded after clinical improvement    1. Severe Sepsis present on admission 2/2 C. Diff colitis: Pancolitis seen on CT abdomen/Pelvis. Cont vancomycin PO- taper regimen recommended by ID. po vancomycin 125 mg q6h for 2 weeks then q12h for 2 more weeks, then 125 mg q24h for 2 more weeks  2. Persistent Afib: On xarelto and cardizem.   3 HTN: Cont cardizem  4. LARS, pre-renal in setting of septic shock and diarrhea: Creatinine improving on IVF. Continue to monitor  5. Stage 2 bilateral decubiti buttocks ulcers, present on admission: Cont wound care  6. Metabolic Encephalopathy present on admission d/t sepsis, now back to baseline: Will continue to monitor    GI/DVT PPX    #Progress Note Handoff  Pending (specify): Resolution of diarrhea  Family discussion: Daughter not agreeable to palliative care.   Disposition: Unknown